# Patient Record
Sex: FEMALE | Race: WHITE | Employment: OTHER | URBAN - METROPOLITAN AREA
[De-identification: names, ages, dates, MRNs, and addresses within clinical notes are randomized per-mention and may not be internally consistent; named-entity substitution may affect disease eponyms.]

---

## 2017-04-07 PROBLEM — E78.5 HYPERLIPIDEMIA: Status: ACTIVE | Noted: 2017-04-07

## 2017-04-07 PROBLEM — J44.9 CHRONIC OBSTRUCTIVE PULMONARY DISEASE (HCC): Status: ACTIVE | Noted: 2017-04-07

## 2017-04-07 PROBLEM — E03.9 HYPOTHYROIDISM: Status: ACTIVE | Noted: 2017-04-07

## 2017-04-07 PROBLEM — I10 ESSENTIAL HYPERTENSION: Status: ACTIVE | Noted: 2017-04-07

## 2017-12-14 PROBLEM — F17.200 TOBACCO USE DISORDER: Status: ACTIVE | Noted: 2017-12-14

## 2017-12-14 PROBLEM — J30.2 CHRONIC SEASONAL ALLERGIC RHINITIS DUE TO FUNGAL SPORES: Status: ACTIVE | Noted: 2017-12-14

## 2017-12-14 PROBLEM — K58.2 IRRITABLE BOWEL SYNDROME WITH BOTH CONSTIPATION AND DIARRHEA: Status: ACTIVE | Noted: 2017-12-14

## 2017-12-14 PROBLEM — G89.4 CHRONIC PAIN SYNDROME: Status: ACTIVE | Noted: 2017-12-14

## 2017-12-14 PROBLEM — F33.9 RECURRENT MAJOR DEPRESSIVE DISORDER (HCC): Status: ACTIVE | Noted: 2017-12-14

## 2017-12-14 PROBLEM — K44.9 HERNIA, HIATAL: Status: ACTIVE | Noted: 2017-12-14

## 2018-05-01 ENCOUNTER — HOSPITAL ENCOUNTER (OUTPATIENT)
Age: 63
Discharge: HOME | End: 2018-05-03
Payer: MEDICARE

## 2018-05-01 DIAGNOSIS — E78.2 MIXED HYPERLIPIDEMIA: ICD-10-CM

## 2018-05-01 DIAGNOSIS — E03.9 ACQUIRED HYPOTHYROIDISM: ICD-10-CM

## 2018-05-01 LAB
ALBUMIN SERPL-MCNC: 4.4 G/DL (ref 3.5–5.2)
ALP BLD-CCNC: 80 U/L (ref 35–104)
ALT SERPL-CCNC: 30 U/L (ref 0–32)
ANION GAP SERPL CALCULATED.3IONS-SCNC: 14 MMOL/L (ref 7–16)
AST SERPL-CCNC: 32 U/L (ref 0–31)
BILIRUB SERPL-MCNC: 0.5 MG/DL (ref 0–1.2)
BUN BLDV-MCNC: 14 MG/DL (ref 8–23)
CALCIUM SERPL-MCNC: 9.5 MG/DL (ref 8.6–10.2)
CHLORIDE BLD-SCNC: 94 MMOL/L (ref 98–107)
CHOLESTEROL, TOTAL: 188 MG/DL (ref 0–199)
CO2: 29 MMOL/L (ref 22–29)
CREAT SERPL-MCNC: 1.1 MG/DL (ref 0.5–1)
GFR AFRICAN AMERICAN: >60
GFR NON-AFRICAN AMERICAN: 50 ML/MIN/1.73
GLUCOSE BLD-MCNC: 86 MG/DL (ref 74–109)
HDLC SERPL-MCNC: 78 MG/DL
LDL CHOLESTEROL CALCULATED: 94 MG/DL (ref 0–99)
POTASSIUM SERPL-SCNC: 4.9 MMOL/L (ref 3.5–5)
SODIUM BLD-SCNC: 137 MMOL/L (ref 132–146)
TOTAL PROTEIN: 7.4 G/DL (ref 6.4–8.3)
TRIGL SERPL-MCNC: 82 MG/DL (ref 0–149)
TSH SERPL DL<=0.05 MIU/L-ACNC: 6.28 UIU/ML (ref 0.27–4.2)
VLDLC SERPL CALC-MCNC: 16 MG/DL

## 2018-05-01 PROCEDURE — 80053 COMPREHEN METABOLIC PANEL: CPT

## 2018-05-01 PROCEDURE — 84443 ASSAY THYROID STIM HORMONE: CPT

## 2018-05-01 PROCEDURE — 80061 LIPID PANEL: CPT

## 2018-11-02 ENCOUNTER — HOSPITAL ENCOUNTER (OUTPATIENT)
Age: 63
Discharge: HOME | End: 2018-11-04
Payer: MEDICARE

## 2018-11-02 DIAGNOSIS — E03.9 ACQUIRED HYPOTHYROIDISM: ICD-10-CM

## 2018-11-02 LAB
ALBUMIN SERPL-MCNC: 4 G/DL (ref 3.5–5.2)
ALP BLD-CCNC: 62 U/L (ref 35–104)
ALT SERPL-CCNC: 17 U/L (ref 0–32)
ANION GAP SERPL CALCULATED.3IONS-SCNC: 14 MMOL/L (ref 7–16)
AST SERPL-CCNC: 27 U/L (ref 0–31)
BILIRUB SERPL-MCNC: 0.5 MG/DL (ref 0–1.2)
BUN BLDV-MCNC: 8 MG/DL (ref 8–23)
CALCIUM SERPL-MCNC: 9.4 MG/DL (ref 8.6–10.2)
CHLORIDE BLD-SCNC: 97 MMOL/L (ref 98–107)
CO2: 28 MMOL/L (ref 22–29)
CREAT SERPL-MCNC: 1 MG/DL (ref 0.5–1)
GFR AFRICAN AMERICAN: >60
GFR NON-AFRICAN AMERICAN: 56 ML/MIN/1.73
GLUCOSE BLD-MCNC: 91 MG/DL (ref 74–109)
POTASSIUM SERPL-SCNC: 4.4 MMOL/L (ref 3.5–5)
SODIUM BLD-SCNC: 139 MMOL/L (ref 132–146)
TOTAL PROTEIN: 7 G/DL (ref 6.4–8.3)
TSH SERPL DL<=0.05 MIU/L-ACNC: 0.3 UIU/ML (ref 0.27–4.2)

## 2018-11-02 PROCEDURE — 80053 COMPREHEN METABOLIC PANEL: CPT

## 2018-11-02 PROCEDURE — 84443 ASSAY THYROID STIM HORMONE: CPT

## 2019-06-11 ENCOUNTER — HOSPITAL ENCOUNTER (OUTPATIENT)
Dept: GENERAL RADIOLOGY | Age: 64
Discharge: HOME | End: 2019-06-13
Payer: MEDICARE

## 2019-06-11 ENCOUNTER — HOSPITAL ENCOUNTER (OUTPATIENT)
Age: 64
Discharge: HOME | End: 2019-06-13
Payer: MEDICARE

## 2019-06-11 DIAGNOSIS — M25.552 PAIN OF LEFT HIP JOINT: ICD-10-CM

## 2019-06-11 PROCEDURE — 73521 X-RAY EXAM HIPS BI 2 VIEWS: CPT

## 2019-06-25 ENCOUNTER — OFFICE VISIT (OUTPATIENT)
Dept: ORTHOPEDIC SURGERY | Age: 64
End: 2019-06-25
Payer: MEDICARE

## 2019-06-25 VITALS
SYSTOLIC BLOOD PRESSURE: 123 MMHG | HEART RATE: 73 BPM | DIASTOLIC BLOOD PRESSURE: 75 MMHG | WEIGHT: 159 LBS | TEMPERATURE: 98.4 F | HEIGHT: 60 IN | BODY MASS INDEX: 31.22 KG/M2

## 2019-06-25 DIAGNOSIS — M25.552 PAIN OF LEFT HIP JOINT: Primary | ICD-10-CM

## 2019-06-25 PROCEDURE — 1123F ACP DISCUSS/DSCN MKR DOCD: CPT | Performed by: ORTHOPAEDIC SURGERY

## 2019-06-25 PROCEDURE — 1090F PRES/ABSN URINE INCON ASSESS: CPT | Performed by: ORTHOPAEDIC SURGERY

## 2019-06-25 PROCEDURE — 4004F PT TOBACCO SCREEN RCVD TLK: CPT | Performed by: ORTHOPAEDIC SURGERY

## 2019-06-25 PROCEDURE — G8427 DOCREV CUR MEDS BY ELIG CLIN: HCPCS | Performed by: ORTHOPAEDIC SURGERY

## 2019-06-25 PROCEDURE — 99203 OFFICE O/P NEW LOW 30 MIN: CPT | Performed by: ORTHOPAEDIC SURGERY

## 2019-06-25 PROCEDURE — 4040F PNEUMOC VAC/ADMIN/RCVD: CPT | Performed by: ORTHOPAEDIC SURGERY

## 2019-06-25 PROCEDURE — 3017F COLORECTAL CA SCREEN DOC REV: CPT | Performed by: ORTHOPAEDIC SURGERY

## 2019-06-25 PROCEDURE — G8399 PT W/DXA RESULTS DOCUMENT: HCPCS | Performed by: ORTHOPAEDIC SURGERY

## 2019-06-25 PROCEDURE — G8417 CALC BMI ABV UP PARAM F/U: HCPCS | Performed by: ORTHOPAEDIC SURGERY

## 2019-06-26 NOTE — PROGRESS NOTES
Chief Complaint:   Chief Complaint   Patient presents with    Hip Pain     Lt hip pain x several months. Pain starts in Lt groin and radiates into Lt thigh. Pain usually at night when sitting or in bed. Must stand up to alleviate pain. Also c/o buckling Lt hip. X-ray in [facility]       HPI     Ana Schuler is a 71 y.o. female, who presents with a few month history of evidently spontaneous discomfort in her about the left hip, no history of trauma no previous problems with this joint no other joint complaints. No fever chills sweats or other systemic symptoms. Pain is diffuse about the hip sometimes radiating towards her groin and anterior thigh aggravated actually more when supine or sitting not with weightbearing. Has not really taken anything significant for pain, symptoms are interfering with daily activities. Allergies; medications; past medical, surgical, family, and social history; and problem list have been reviewed today and updated as indicated in this encounter - see below following Ortho specifics. Musculoskeletal: Upper extremities intact leg lengths are equal hip motion is normal bilaterally although mild diffuse discomfort about the left hip with full internal rotation. No crepitus felt. Knees are straight stable no laxity deformity or effusion. No motor or sensory deficit straight leg raising negative. Radiologic Studies: Recent x-ray of pelvis and left hip is reviewed in [facility], joint spaces are preserved without appreciable narrowing sclerosis or osteophyte formation, no evidence for appreciable degenerative disease. ASSESSMENT/PLAN:    Lee Ko was seen today for hip pain.     Diagnoses and all orders for this visit:    Pain of left hip joint  -     Amb External Referral To Physical Therapy     Treatment alternatives were reviewed including medical and physical therapies, injections, and surgical options, expected risks benefits and likely outcome of each were discussed in detail, questions asked and answered and understood. Given the normal x-rays I think the patient's pain may be myofascial or she may have labral pathology. Nonsurgical approaches advised, patient was referred for physical therapy as initial event, advised to use over-the-counter's as needed for pain. Questions asked and answered if symptoms persist follow-up in a couple of months for repeat exam possible diagnostic injection left hip. Return if symptoms worsen or fail to improve.        Zunilda Curry MD    6/26/2019  11:04 AM      Patient Active Problem List   Diagnosis    Hyperlipidemia    Hypothyroidism    Essential hypertension    Chronic obstructive pulmonary disease (HCC)    Chronic seasonal allergic rhinitis due to fungal spores    Chronic pain syndrome    Hernia, hiatal    Irritable bowel syndrome with both constipation and diarrhea    Recurrent major depressive disorder ([address]    Tobacco use disorder    Allergic rhinitis    COPD (chronic obstructive pulmonary disease) (HCC)    Minor depressive disorder    GERD (gastroesophageal reflux disease)    Ischemic colitis ([address]       Past Medical History:   Diagnosis Date    Allergic rhinitis     Arthritis     Asthma     Chronic pain syndrome     COPD (chronic obstructive pulmonary disease) (HCC)     GERD (gastroesophageal reflux disease)     Hernia, hiatal 12/14/2017    Hyperlipidemia     Hypertension     Hypothyroidism     Hypothyroidism 4/7/2017    Irritable bowel     Ischemic colitis ([address]     Minor depressive disorder     Pneumonia     Recurrent major depressive disorder ([address] 12/14/2017    Tobacco use disorder        Past Surgical History:   Procedure Laterality Date    APPENDECTOMY      CARDIOVASCULAR STRESS TEST  02/2006    Dr Nick Doan 70%    CHOLECYSTECTOMY      COLONOSCOPY  02/20/2013    -Ischemic colitis involving the descending and sigmoid colon-diverticulosis of the sigmoid colon    COLONOSCOPY  05/12/2008 of [facility]    Hypertension Mother     Lung Cancer Father         mets to brain         Review of Systems  As follows except as previously noted in HPI:  Constitutional: Negative for chills, diaphoresis, fatigue, fever and unexpected weight change. Respiratory: Negative for cough, shortness of breath and wheezing. Cardiovascular: Negative for chest pain and palpitations. Neurological: Negative for dizziness, syncope, cephalgia. GI / : negative  Musculoskeletal: see HPI       Objective:   Physical Exam   Constitutional: Oriented to person, place, and time. and appears well-developed and well-nourished. :   Head: Normocephalic and atraumatic. Eyes: EOM are normal.   Neck: Neck supple. Cardiovascular: Normal rate and regular rhythm. Pulmonary/Chest: Effort normal. No stridor. No respiratory distress, no wheezes. Abdominal:  No abnormal distension. Neurological: Alert and oriented to person, place, and time. Skin: Skin is warm and dry. Psychiatric: Normal mood and affect.  Behavior is normal. Thought content normal.

## 2020-01-02 ENCOUNTER — HOSPITAL ENCOUNTER (OUTPATIENT)
Dept: CT IMAGING | Age: 65
Discharge: HOME | End: 2020-01-04
Payer: MEDICARE

## 2020-01-02 PROCEDURE — G0297 LDCT FOR LUNG CA SCREEN: HCPCS

## 2020-06-14 PROBLEM — J45.909 ASTHMATIC BRONCHITIS: Status: ACTIVE | Noted: 2020-06-14

## 2020-11-03 PROBLEM — J44.9 CHRONIC OBSTRUCTIVE PULMONARY DISEASE (HCC): Status: RESOLVED | Noted: 2017-04-07 | Resolved: 2020-11-03

## 2021-03-19 DIAGNOSIS — I10 ESSENTIAL HYPERTENSION: ICD-10-CM

## 2021-03-19 DIAGNOSIS — E78.2 MIXED HYPERLIPIDEMIA: ICD-10-CM

## 2021-03-19 DIAGNOSIS — E03.9 ACQUIRED HYPOTHYROIDISM: ICD-10-CM

## 2021-03-19 LAB
ALBUMIN SERPL-MCNC: 4.2 G/DL (ref 3.5–5.2)
ALP BLD-CCNC: 101 U/L (ref 35–104)
ALT SERPL-CCNC: 19 U/L (ref 0–32)
ANION GAP SERPL CALCULATED.3IONS-SCNC: 7 MMOL/L (ref 7–16)
AST SERPL-CCNC: 29 U/L (ref 0–31)
BILIRUB SERPL-MCNC: 0.4 MG/DL (ref 0–1.2)
BUN BLDV-MCNC: 11 MG/DL (ref 8–23)
CALCIUM SERPL-MCNC: 8.8 MG/DL (ref 8.6–10.2)
CHLORIDE BLD-SCNC: 100 MMOL/L (ref 98–107)
CHOLESTEROL, TOTAL: 196 MG/DL (ref 0–199)
CO2: 31 MMOL/L (ref 22–29)
CREAT SERPL-MCNC: 1.2 MG/DL (ref 0.5–1)
GFR AFRICAN AMERICAN: 54
GFR NON-AFRICAN AMERICAN: 45 ML/MIN/1.73
GLUCOSE BLD-MCNC: 113 MG/DL (ref 74–99)
HDLC SERPL-MCNC: 49 MG/DL
LDL CHOLESTEROL CALCULATED: 121 MG/DL (ref 0–99)
POTASSIUM SERPL-SCNC: 4.4 MMOL/L (ref 3.5–5)
SODIUM BLD-SCNC: 138 MMOL/L (ref 132–146)
TOTAL PROTEIN: 7 G/DL (ref 6.4–8.3)
TRIGL SERPL-MCNC: 130 MG/DL (ref 0–149)
TSH SERPL DL<=0.05 MIU/L-ACNC: 8.81 UIU/ML (ref 0.27–4.2)
VLDLC SERPL CALC-MCNC: 26 MG/DL

## 2021-05-06 DIAGNOSIS — R35.0 URINE FREQUENCY: ICD-10-CM

## 2021-05-06 DIAGNOSIS — E03.9 ACQUIRED HYPOTHYROIDISM: ICD-10-CM

## 2021-05-06 LAB — TSH SERPL DL<=0.05 MIU/L-ACNC: 6.66 UIU/ML (ref 0.27–4.2)

## 2021-05-08 LAB — URINE CULTURE, ROUTINE: NORMAL

## 2021-05-13 ENCOUNTER — TELEPHONE (OUTPATIENT)
Dept: CASE MANAGEMENT | Age: 66
End: 2021-05-13

## 2021-05-17 ENCOUNTER — HOSPITAL ENCOUNTER (OUTPATIENT)
Dept: CT IMAGING | Age: 66
Discharge: HOME | End: 2021-05-19
Payer: MEDICARE

## 2021-05-17 DIAGNOSIS — Z87.891 PERSONAL HISTORY OF TOBACCO USE: ICD-10-CM

## 2021-05-17 PROCEDURE — 71271 CT THORAX LUNG CANCER SCR C-: CPT

## 2021-05-18 ENCOUNTER — TELEPHONE (OUTPATIENT)
Dept: CASE MANAGEMENT | Age: 66
End: 2021-05-18

## 2021-05-18 NOTE — TELEPHONE ENCOUNTER
No call, encounter opened to process CT Lung Screening. CT Lung Screen: 5/17/2021  Impression   There is no pulmonary mass or suspicious pulmonary nodule.       Mild emphysematous changes.       LUNG RADS:   Per ACR Lung-RADS Version 1.1       Category 2, Benign appearance or behavior.       Management:  Continue annual lung screening with LDCT in 12 months.       RECOMMENDATIONS:   If you would like to register your patient with the [facility], please contact the Nurse Navigator at   [phone]. Pack years: 39    Social History     Tobacco Use  Smoking Status: Former Smoker    Start Date: 1972   Quit Date: 2020   Types: Cigarettes   Packs/Day: 1   Years: 39   Pack Years: 39   Smokeless Tobacco: Never Used         Results letter sent to patient via my chart or mailed.      [address]

## 2021-08-09 ENCOUNTER — HOSPITAL ENCOUNTER (OUTPATIENT)
Age: 66
Discharge: HOME | End: 2021-08-09
Payer: COMMERCIAL

## 2021-08-09 PROCEDURE — 93005 ELECTROCARDIOGRAM TRACING: CPT | Performed by: PSYCHIATRY & NEUROLOGY

## 2021-08-10 LAB
EKG ATRIAL RATE: 66 BPM
EKG P AXIS: 69 DEGREES
EKG P-R INTERVAL: 162 MS
EKG Q-T INTERVAL: 396 MS
EKG QRS DURATION: 80 MS
EKG QTC CALCULATION (BAZETT): 415 MS
EKG R AXIS: 51 DEGREES
EKG T AXIS: 52 DEGREES
EKG VENTRICULAR RATE: 66 BPM

## 2021-08-10 PROCEDURE — 93010 ELECTROCARDIOGRAM REPORT: CPT | Performed by: INTERNAL MEDICINE

## 2022-02-21 ENCOUNTER — OFFICE VISIT (OUTPATIENT)
Dept: PULMONOLOGY | Age: 67
End: 2022-02-21
Payer: MEDICARE

## 2022-02-21 VITALS
DIASTOLIC BLOOD PRESSURE: 87 MMHG | OXYGEN SATURATION: 93 % | RESPIRATION RATE: 16 BRPM | HEIGHT: 60 IN | TEMPERATURE: 98.4 F | BODY MASS INDEX: 34.89 KG/M2 | WEIGHT: 177.7 LBS | HEART RATE: 78 BPM | SYSTOLIC BLOOD PRESSURE: 135 MMHG

## 2022-02-21 DIAGNOSIS — J43.1 PANLOBULAR EMPHYSEMA (HCC): Primary | ICD-10-CM

## 2022-02-21 DIAGNOSIS — Z87.891 PERSONAL HISTORY OF TOBACCO USE: ICD-10-CM

## 2022-02-21 LAB
EXPIRATORY TIME-POST: 6.38 SEC
EXPIRATORY TIME: 6.85 SEC
FEF 25-75% %CHNG: 55
FEF 25-75% %PRED-POST: 37 %
FEF 25-75% %PRED-PRE: 24 L/SEC
FEF 25-75% PRED: 1.79 L/SEC
FEF 25-75%-POST: 0.67 L/SEC
FEF 25-75%-PRE: 0.43 L/SEC
FEV1 %PRED-POST: 51 %
FEV1 %PRED-PRE: 41 %
FEV1 PRED: 1.99 L
FEV1-POST: 1.03 L
FEV1-PRE: 0.83 L
FEV1/FVC %PRED-POST: 71 %
FEV1/FVC %PRED-PRE: 63 %
FEV1/FVC PRED: 79 %
FEV1/FVC-POST: 57 %
FEV1/FVC-PRE: 50 %
FVC %PRED-POST: 72 L
FVC %PRED-PRE: 65 %
FVC PRED: 2.53 L
FVC-POST: 1.83 L
FVC-PRE: 1.66 L
PEF %PRED-POST: 28 %
PEF %PRED-PRE: 23 L/SEC
PEF PRED: 5.22 L/SEC
PEF%CHNG: 17
PEF-POST: 1.47 L/SEC
PEF-PRE: 1.25 L/SEC

## 2022-02-21 PROCEDURE — 3017F COLORECTAL CA SCREEN DOC REV: CPT | Performed by: INTERNAL MEDICINE

## 2022-02-21 PROCEDURE — 1123F ACP DISCUSS/DSCN MKR DOCD: CPT | Performed by: INTERNAL MEDICINE

## 2022-02-21 PROCEDURE — 94060 EVALUATION OF WHEEZING: CPT | Performed by: INTERNAL MEDICINE

## 2022-02-21 PROCEDURE — G8428 CUR MEDS NOT DOCUMENT: HCPCS | Performed by: INTERNAL MEDICINE

## 2022-02-21 PROCEDURE — 99203 OFFICE O/P NEW LOW 30 MIN: CPT | Performed by: INTERNAL MEDICINE

## 2022-02-21 PROCEDURE — G8417 CALC BMI ABV UP PARAM F/U: HCPCS | Performed by: INTERNAL MEDICINE

## 2022-02-21 PROCEDURE — 4040F PNEUMOC VAC/ADMIN/RCVD: CPT | Performed by: INTERNAL MEDICINE

## 2022-02-21 PROCEDURE — G8399 PT W/DXA RESULTS DOCUMENT: HCPCS | Performed by: INTERNAL MEDICINE

## 2022-02-21 PROCEDURE — G0296 VISIT TO DETERM LDCT ELIG: HCPCS | Performed by: INTERNAL MEDICINE

## 2022-02-21 PROCEDURE — 1036F TOBACCO NON-USER: CPT | Performed by: INTERNAL MEDICINE

## 2022-02-21 PROCEDURE — 3023F SPIROM DOC REV: CPT | Performed by: INTERNAL MEDICINE

## 2022-02-21 PROCEDURE — G8484 FLU IMMUNIZE NO ADMIN: HCPCS | Performed by: INTERNAL MEDICINE

## 2022-02-21 PROCEDURE — 1090F PRES/ABSN URINE INCON ASSESS: CPT | Performed by: INTERNAL MEDICINE

## 2022-02-21 RX ORDER — FLUTICASONE FUROATE, UMECLIDINIUM BROMIDE AND VILANTEROL TRIFENATATE 100; 62.5; 25 UG/1; UG/1; UG/1
1 POWDER RESPIRATORY (INHALATION) DAILY
Qty: 3 EACH | Refills: 3 | Status: SHIPPED | OUTPATIENT
Start: 2022-02-21

## 2022-02-21 ASSESSMENT — PULMONARY FUNCTION TESTS
FVC_PERCENT_PREDICTED_PRE: 65
FEV1_PERCENT_PREDICTED_PRE: 41
FEV1_PREDICTED: 1.99
FVC_PREDICTED: 2.53
FVC_PERCENT_PREDICTED_POST: 72
FEV1/FVC_PRE: 50
FEV1_POST: 1.03
FEV1_PRE: 0.83
FVC_POST: 1.83
FEV1_PERCENT_PREDICTED_POST: 51
FEV1/FVC_PREDICTED: 79
FVC_PRE: 1.66
FEV1/FVC_PERCENT_PREDICTED_PRE: 63
FEV1/FVC_PERCENT_PREDICTED_POST: 71
FEV1/FVC_POST: 57

## 2022-02-21 NOTE — PATIENT INSTRUCTIONS
What is lung cancer screening? Lung cancer screening is a way in which doctors check the lungs for early signs of cancer in people who have no symptoms of lung cancer. A low-dose CT scan uses much less radiation than a normal CT scan and shows a more detailed image of the lungs than a standard X-ray. The goal of lung cancer screening is to find cancer early, before it has a chance to grow, spread, or cause problems. One large study found that smokers who were screened with low-dose CT scans were less likely to die of lung cancer than those who were screened with standard X-ray. Below is a summary of the things you need to know regarding screening for lung cancer with low-dose computed tomography (LDCT). This is a screening program that involves routine annual screening with LDCT studies of the lung. The LDCTs are done using low-dose radiation that is not thought to increase your cancer risk. If you have other serious medical conditions (other cancers, congestive heart failure) that limit your life expectancy to less than 10 years, you should not undergo lung cancer screening with LDCT. The chance is 20%-60% that the LDCT result will show abnormalities. This would require additional testing which could include repeat imaging or even invasive procedures. Most (about 95%) of \"abnormal\" LDCT results are false in the sense that no lung cancer is ultimately found. Additionally, some (about 10%) of the cancers found would not affect your life expectancy, even if undetected and untreated. If you are still smoking, the single most important thing that you can do to reduce your risk of dying of lung cancer is to quit. For this screening to be covered by Medicare and most other insurers, strict criteria must be met. If you do not meet these criteria, but still wish to undergo LDCT testing, you will be required to sign a waiver indicating your willingness to pay for the scan.         Patient Education Chronic Obstructive Pulmonary Disease (COPD): Care Instructions  Overview     Chronic obstructive pulmonary disease (COPD) is a lung disease that makes it hard to breathe. With COPD, the airways that lead to the lungs are narrowed, and the tiny air sacs in the lungs are damaged and lose their stretch. People with COPD have decreased airflow in and out of the lungs, which makes it hard to breathe. The airways also can get clogged with thick mucus. Cigarette smoking is a major cause of COPD. Although there is no cure for COPD, you can slow its progress. Following your treatment plan and taking care of yourself can help you feel better and live longer. Follow-up care is a key part of your treatment and safety. Be sure to make and go to all appointments, and call your doctor if you are having problems. It's also a good idea to know your test results and keep a list of the medicines you take. How can you care for yourself at home? Staying healthy    · Do not smoke. This is the most important step you can take to prevent more damage to your lungs. If you need help quitting, talk to your doctor about stop-smoking programs and medicines. These can increase your chances of quitting for good.     · Avoid colds and other infections. Get the pneumococcal and whooping cough (pertussis) vaccines. If you have had these vaccines before, ask your doctor if you need another dose. Get the flu vaccine every fall. If you must be around people with colds or the flu, wash your hands often.     · Avoid secondhand smoke and air pollution. Try to stay inside with your windows closed when air pollution is bad. Medicines and oxygen therapy    · Take your medicines exactly as prescribed. Call your doctor if you think you are having a problem with your medicine. You may be taking medicines such as:  ? Bronchodilators. These help open your airways and make breathing easier.  They are either short-acting (work for 4 to 9 hours) or long-acting (work for 12 to 24 hours). You inhale most bronchodilators, so they start to act quickly. Always carry your quick-relief inhaler with you in case you need it. ? Corticosteroids (prednisone, budesonide). These reduce airway inflammation. They come in inhaled or pill form.     · Ask your doctor or pharmacist if you are using each type of inhaler correctly. With correct use, the medicine is more likely to get to your lungs.     · See if a spacer is right for you. A spacer may also help you get more inhaled medicine to your lungs. If you use one, ask how to use it properly.     · Do not take any vitamins, over-the-counter medicine, or herbal products without talking to your doctor first.     · If your doctor prescribed antibiotics, take them as directed. Do not stop taking them just because you feel better. You need to take the full course of antibiotics.     · If you use oxygen therapy, use the flow rate your doctor has recommended. Don't change it without talking to your doctor first. Oxygen therapy boosts the amount of oxygen in your blood and helps you breathe easier. Activity    · Get regular exercise. Walking is an easy way to get exercise. Start out slowly, and walk a little more each day.     · Pay attention to your breathing. You are exercising too hard if you can't talk while you exercise.     · Take short rest breaks when doing household chores and other activities.     · Learn breathing methods--such as breathing through pursed lips--to help you become less short of breath.     · If your doctor has not set you up with a pulmonary rehabilitation program, ask if rehab is right for you. Rehab includes exercise programs, education about your disease and how to manage it, help with diet and other changes, and emotional support.    Diet    · Eat regular, healthy meals.     · Use bronchodilators about 1 hour before you eat to make it easier to eat.     · Eat several smaller, frequent meals to prevent getting too full. A full stomach can push on the muscle that helps you breathe (your diaphragm) and make it harder to breathe.     · Drink beverages at the end of the meal.     · Avoid foods that are hard to chew.     · Eat foods that contain protein so you don't lose muscle mass.     · Talk with your doctor if you gain too much weight or if you lose weight without trying. Mental health    · Talk to your family, friends, or a therapist about your feelings. Some people feel frightened, angry, hopeless, helpless, and even guilty. Talking openly about feelings may help you cope. If these feelings last, talk to your doctor. When should you call for help? Call 911 anytime you think you may need emergency care. For example, call if:    · You have severe trouble breathing.     · You are having chest pain that is different or worse than usual.   Call your doctor now or seek immediate medical care if:    · You have new or worse trouble breathing.     · You cough up blood.     · You have a fever.     · You have feelings of anxiety or depression. Watch closely for changes in your health, and be sure to contact your doctor if:    · You cough more deeply or more often, especially if you notice more mucus or a change in the color of your mucus.     · You have new or worse swelling in your legs or belly.     · You are not getting better as expected. Where can you learn more? Go to [email or web address] org and sign in to your [employer] account. Enter N279 in the Sightly box to learn more about \"Chronic Obstructive Pulmonary Disease (COPD): Care Instructions. \"     If you do not have an account, please click on the \"Sign Up Now\" link. Current as of: July 6, 2021               Content Version: 13.1  © 4682-8444 Healthwise, Incorporated. Care instructions adapted under license by [facility] ([facility]).  If you have questions about a medical condition or this instruction, always ask your healthcare professional. [address] any warranty or liability for your use of this information. [address] [facility]  [address] [facility], [address] [facility]  Office: [phone]      Your were seen in the office today for COPD      We  did make any changes to your medications today. Stop Flovent Stop spiriva  Start Trelegy    Testing ordered today was CT scan of the chest      Vaccines recommended  none    Follow up in 6 months, Please call us sooner if the Trelegy is not helping your symptoms or they fail to improve. Please do not hesitate to call the office with any questions.

## 2022-02-21 NOTE — PROGRESS NOTES
[facility]     HISTORY OF PRESENT ILLNESS:    Gabriela Rose is a 78y.o. year old female here for evaluation of eczema. The patient reports that despite her inhalers she is having more frequent issues with her breathing. She is currently prescribed Spiriva, Flovent, and Proventil. She reports that she is sometimes using the Flovent 3-4 times a day. Her breathing is worse with humidity and cold air. Symptoms include wheezing. She also reports having some sinus drainage and a daily cough. She quit smoking 2 or 3 years ago she reports that previously she smoked half to 1 pack/day for 40-adair years. She denies any thoracic surgeries. She denies any alcohol. No illicits. Her inhalational exposures reports that she is around hay, raccoons, a wood burner, diesel smoke, and she has 2 horses. She has no significant travel history. She reports that her father  of lung cancer and that her brother has COPD and \"spots on his lungs.     ALLERGIES:    Allergies   Allergen Reactions    Celebrex [Celecoxib]     Simvastatin     Avelox [Moxifloxacin Hcl In Nacl]     Biaxin [Clarithromycin]     Clarithromycin     Desvenlafaxine     Lipitor [Atorvastatin]     Lyrica [Pregabalin]     Tetracyclines & Related     Zithromax [Azithromycin]     Sulfa Antibiotics Rash    Vioxx [Rofecoxib] Nausea And Vomiting       PAST MEDICAL HISTORY:       Diagnosis Date    Allergic rhinitis     Chronic pain syndrome     [facility], Dr. Roxane Handley    COPD (chronic obstructive pulmonary disease) ([facility] [address]     follows with Pulmonology, Dr. Tio Clifford    GERD (gastroesophageal reflux disease)     Grade I diastolic dysfunction 4451    Hernia, hiatal 2017    Hyperlipidemia     Hypertension     Hypothyroidism 2017    Irritable bowel     Ischemic colitis ([facility] [address]     Mild tricuspid regurgitation     Overactive bladder     follows with Urology, Dr. Sarah Busch    Pneumonia     Pulmonary HTN [facility]) 2013    mild    Recurrent major depressive disorder ([facility] [address] 12/14/2017    follows with Psychiatry, Dr. Earline Awan    Restless legs syndrome     Vitamin D deficiency        MEDICATIONS:   Current Outpatient Medications   Medication Sig Dispense Refill    TRELEGY ELLIPTA 100-62.5-25 MCG/INH AEPB Inhale 1 puff into the lungs daily 3 each 3    montelukast (SINGULAIR) 10 MG tablet Take 1 tablet by mouth nightly 30 tablet 2    Cholecalciferol (VITAMIN D3) 1.25 MG (85253 UT) CAPS Take 1 capsule by mouth once a week 4 capsule 2    levothyroxine (SYNTHROID) 100 MCG tablet Take 1 tablet by mouth daily 30 tablet 2    rOPINIRole (REQUIP) 1 MG tablet Take 1 tablet by mouth nightly 30 tablet 2    benazepril-hydrochlorthiazide (LOTENSIN HCT) 20-12.5 MG per tablet Take 1 tablet by mouth daily 30 tablet 2    PROVENTIL  (90 Base) MCG/ACT inhaler Inhale 2 puffs into the lungs every 6 hours as needed for Wheezing (wheezing) Wants the proventil. Will pay out of pocket. (Patient taking differently: Inhale 2 puffs into the lungs every 6 hours as needed for Wheezing (wheezing) ) 1 each 3    simvastatin (ZOCOR) 40 MG tablet Take 1 tablet by mouth every evening (Patient taking differently: Take 40 mg by mouth once a week ) 90 tablet 1    citalopram (CELEXA) 20 MG tablet Take 20 mg by mouth daily       traZODone (DESYREL) 100 MG tablet Take 100 mg by mouth every evening      Coenzyme Q10 (COQ-10) 10 MG CAPS Take 1 capsule by mouth daily      fluticasone (FLONASE) 50 MCG/ACT nasal spray 2 sprays by Nasal route daily 1 Bottle 3    TL STANLEY RX 2.2-25-1 MG TABS tablet Take 1 tablet by mouth daily  1    lidocaine (LIDODERM) 5 % Place 1 patch onto the skin 2 times daily 12 hours on, 12 hours off. 60 patch 5    omeprazole (PRILOSEC) 40 MG capsule Take 1 capsule by mouth daily.  30 capsule 3    HYDROcodone-acetaminophen (NORCO)  MG per tablet Take 1 tablet by mouth every 8 hours as needed.  LORazepam (ATIVAN) 1 MG tablet Take 2 mg by mouth in the morning and at bedtime. No current facility-administered medications for this visit. SOCIAL AND OCCUPATIONAL HEALTH: Patient is a former smoker. She previously smoked 1/2 to 1 pack/day for approximately 40 years. She quit 2 to 3 years ago. She has no significant travel history. She does have 2 horses and is around hay, raccoons, a wood burner, and diesel smoke. Otherwise no known inhalational exposures. SURGICAL HISTORY:   Past Surgical History:   Procedure Laterality Date    APPENDECTOMY N/A     CARDIOVASCULAR STRESS TEST N/A 02/2006    Dr. Gina Low; EF=70%    CHOLECYSTECTOMY N/A     COLONOSCOPY N/A 02/20/2013    Dr. Theresa Man; ischemic colitis involving the descending and sigmoid colon, diverticulosis of the sigmoid colon    COLONOSCOPY N/A 05/12/2008    Dr. Starla Mcclain; internal hemorrhoids and spastic colon    ECHO COMPLETE N/A 02/19/2013    Dr. Derick Callaway; EF >97%-JRYRY I diastolic dysfunction, with mild TR and mild pulm HTN    TONSILLECTOMY Bilateral     UPPER GASTROINTESTINAL ENDOSCOPY N/A 02/19/2013    Dr. Theresa Man; gastritis    UPPER GASTROINTESTINAL ENDOSCOPY N/A 11/15/2011       FAMILY HISTORY: No family history of cancer, blood clots     REVIEW OF SYSTEMS:  Constitutional: No fevers, chills, unintentional weight loss  Skin: No rashes or lesions  EENT: No change in vision, change in hearing, change in taste, change in smell  Cardiovascular: Denies chest pain, chest pressure, palpitations  Respiration: Positive for increased shortness of breath with exertion, wheezing, nonproductive cough, dyspnea with bending over.   Gastrointestinal: Denies nausea, vomiting, diarrhea  Musculoskeletal: Denies joint or muscle pain  Neurological: Denies syncope, headache, seizures  Psychological: Denies anxiety or depression  Endocrine: Denies polyuria polydipsia  Hematopoietic/lymphatic: Denies easy bruising        PHYSICAL EXAMINATION:  Constitutional: Well-nourished, well-developed. No acute distress  EENT: PERRL, EOMI, no oropharyngeal erythema. No palpable adenopathy  Neck: Trachea and thyroid midline  Respiratory: Breath sounds are currently clear to auscultation bilaterally. There is no use of accessory muscles. Cardiovascular: Regular rate and rhythm, no murmurs rubs or gallops  Pulses: Equal bilaterally  Abdomen: Soft nontender bowel sounds present  Lymphatic: No palpable adenopathy  Musculoskeletal: Gait steady  Extremities: No clubbing, cyanosis, edema. Skin: No rashes or lesions  Neurological/Psychiatric: Neurologically intact, no focal deficits. Affect appropriate    DATA: Biometry was completed in clinic today which demonstrates FVC of 1.66 L which is 65% of predicted. FEV1 of 0.83 L which is 41% of predicted. FEV1 FVC ratio is 50. There is a 24% bronchodilator response. MVV is 29 which is 36% of predicted. SVC is 1.50 which is 59% of predicted. Flow volume loop is consistent with combined obstruction and restriction. Overall pulmonary function testing is consistent with severe COPD. MVV is significantly decreased which may be due to deconditioning versus neuromuscular weakness. IMPRESSION:       1. Severe COPD  2. Former tobacco use  3. Need for low-dose screening CT  4. Pulmonary hypertension? PLAN:      1. We will stop Flovent and Spiriva and start the patient on Trelegy. 2.  Low-dose screening CT of the chest is ordered for May. 3.  Patient is currently quite active however may be a candidate for pulmonary rehab in the future. 4.  There is documentation of a history of pulmonary hypertension on the patient's chart however she is unaware of this diagnosis.   I did review her last echocardiogram which is available which shows in February 2013 that there was a RVSP documented of 37 to 42 mmHg on echocardiogram.  Should patient's symptoms worsen may consider follow-up echocardiogram in future      I hope this updates you on my evaluation and clinical thinking. Thank you for allowing me to participate in his care. Sincerely,        Mli Lopez.  Office: [phone]  Fax: [phone]      Low Dose CT (LDCT) Lung Screening criteria met:     Age 55-77(Medicare) or 50-80 ([facility])   Pack year smoking >30 (Medicare) or >20 ([facility])   Still smoking or less than 15 year since quit   No sign or symptoms of lung cancer   > 11 months since last LDCT     Risks and benefits of lung cancer screening with LDCT scans discussed:    Significance of positive screen - False-positive LDCT results often occur. 95% of all positive results do not lead to a diagnosis of cancer. Usually further imaging can resolve most false-positive results; however, some patients may require invasive procedures. Over diagnosis risk - 10% to 12% of screen-detected lung cancer cases are over diagnosed--that is, the cancer would not have been detected in the patient's lifetime without the screening. Need for follow up screens annually to continue lung cancer screening effectiveness     Risks associated with radiation from annual LDCT- Radiation exposure is about the same as for a mammogram, which is about 1/3 of the annual background radiation exposure from everyday life. Starting screening at age 54 is not likely to increase cancer risk from radiation exposure. Patients with comorbidities resulting in life expectancy of < 10 years, or that would preclude treatment of an abnormality identified on CT, should not be screened due to lack of benefit.     To obtain maximal benefit from this screening, smoking cessation and long-term abstinence from smoking is critical

## 2022-02-21 NOTE — PROGRESS NOTES
Pt presents to the clinic for a referral for panlobular emphysema. Pt given samples of Trelegy in clinic. Prescription for Trelegy sent to pt's preferred pharmacy. Annual CT lung screen ordered during visit. Pt to follow up in clinic in 6 months.

## 2022-02-22 ENCOUNTER — OFFICE VISIT (OUTPATIENT)
Dept: FAMILY MEDICINE CLINIC | Age: 67
End: 2022-02-22
Payer: MEDICARE

## 2022-02-22 VITALS
HEART RATE: 76 BPM | WEIGHT: 176.6 LBS | BODY MASS INDEX: 34.67 KG/M2 | OXYGEN SATURATION: 93 % | DIASTOLIC BLOOD PRESSURE: 88 MMHG | TEMPERATURE: 97.7 F | HEIGHT: 60 IN | SYSTOLIC BLOOD PRESSURE: 168 MMHG

## 2022-02-22 DIAGNOSIS — R10.31 GROIN PAIN, CHRONIC, RIGHT: Primary | ICD-10-CM

## 2022-02-22 DIAGNOSIS — J43.1 PANLOBULAR EMPHYSEMA (HCC): ICD-10-CM

## 2022-02-22 DIAGNOSIS — G25.81 RESTLESS LEG SYNDROME: ICD-10-CM

## 2022-02-22 DIAGNOSIS — E55.9 VITAMIN D DEFICIENCY: ICD-10-CM

## 2022-02-22 DIAGNOSIS — I10 ESSENTIAL HYPERTENSION: ICD-10-CM

## 2022-02-22 DIAGNOSIS — E03.9 ACQUIRED HYPOTHYROIDISM: ICD-10-CM

## 2022-02-22 DIAGNOSIS — G89.29 GROIN PAIN, CHRONIC, RIGHT: Primary | ICD-10-CM

## 2022-02-22 PROBLEM — N32.81 OVERACTIVE BLADDER: Status: ACTIVE | Noted: 2022-02-22

## 2022-02-22 PROBLEM — F17.200 TOBACCO USE DISORDER: Status: RESOLVED | Noted: 2017-12-14 | Resolved: 2022-02-22

## 2022-02-22 PROBLEM — J18.9 PNEUMONIA: Status: RESOLVED | Noted: 2022-02-22 | Resolved: 2022-02-22

## 2022-02-22 PROBLEM — J18.9 PNEUMONIA: Status: ACTIVE | Noted: 2022-02-22

## 2022-02-22 PROCEDURE — 3017F COLORECTAL CA SCREEN DOC REV: CPT | Performed by: FAMILY MEDICINE

## 2022-02-22 PROCEDURE — 1123F ACP DISCUSS/DSCN MKR DOCD: CPT | Performed by: FAMILY MEDICINE

## 2022-02-22 PROCEDURE — G8399 PT W/DXA RESULTS DOCUMENT: HCPCS | Performed by: FAMILY MEDICINE

## 2022-02-22 PROCEDURE — 3023F SPIROM DOC REV: CPT | Performed by: FAMILY MEDICINE

## 2022-02-22 PROCEDURE — G8427 DOCREV CUR MEDS BY ELIG CLIN: HCPCS | Performed by: FAMILY MEDICINE

## 2022-02-22 PROCEDURE — 1036F TOBACCO NON-USER: CPT | Performed by: FAMILY MEDICINE

## 2022-02-22 PROCEDURE — 4040F PNEUMOC VAC/ADMIN/RCVD: CPT | Performed by: FAMILY MEDICINE

## 2022-02-22 PROCEDURE — G8417 CALC BMI ABV UP PARAM F/U: HCPCS | Performed by: FAMILY MEDICINE

## 2022-02-22 PROCEDURE — 99214 OFFICE O/P EST MOD 30 MIN: CPT | Performed by: FAMILY MEDICINE

## 2022-02-22 PROCEDURE — G8484 FLU IMMUNIZE NO ADMIN: HCPCS | Performed by: FAMILY MEDICINE

## 2022-02-22 PROCEDURE — 1090F PRES/ABSN URINE INCON ASSESS: CPT | Performed by: FAMILY MEDICINE

## 2022-02-22 RX ORDER — BENAZEPRIL HYDROCHLORIDE AND HYDROCHLOROTHIAZIDE 20; 12.5 MG/1; MG/1
1 TABLET ORAL DAILY
Qty: 30 TABLET | Refills: 2 | Status: SHIPPED
Start: 2022-02-22 | End: 2022-05-13 | Stop reason: SDUPTHER

## 2022-02-22 RX ORDER — CHOLECALCIFEROL (VITAMIN D3) 1250 MCG
1 CAPSULE ORAL WEEKLY
Qty: 4 CAPSULE | Refills: 2 | Status: SHIPPED
Start: 2022-02-22 | End: 2022-06-13 | Stop reason: SDUPTHER

## 2022-02-22 RX ORDER — ROPINIROLE 1 MG/1
1 TABLET, FILM COATED ORAL NIGHTLY
Qty: 30 TABLET | Refills: 2 | Status: SHIPPED
Start: 2022-02-22 | End: 2022-05-13 | Stop reason: SDUPTHER

## 2022-02-22 RX ORDER — MONTELUKAST SODIUM 10 MG/1
10 TABLET ORAL NIGHTLY
Qty: 30 TABLET | Refills: 2 | Status: SHIPPED
Start: 2022-02-22 | End: 2022-06-13 | Stop reason: SDUPTHER

## 2022-02-22 RX ORDER — LEVOTHYROXINE SODIUM 0.1 MG/1
100 TABLET ORAL DAILY
Qty: 30 TABLET | Refills: 2 | Status: SHIPPED
Start: 2022-02-22 | End: 2022-03-25 | Stop reason: DRUGHIGH

## 2022-02-22 SDOH — ECONOMIC STABILITY: FOOD INSECURITY: WITHIN THE PAST 12 MONTHS, YOU WORRIED THAT YOUR FOOD WOULD RUN OUT BEFORE YOU GOT MONEY TO BUY MORE.: NEVER TRUE

## 2022-02-22 SDOH — ECONOMIC STABILITY: FOOD INSECURITY: WITHIN THE PAST 12 MONTHS, THE FOOD YOU BOUGHT JUST DIDN'T LAST AND YOU DIDN'T HAVE MONEY TO GET MORE.: NEVER TRUE

## 2022-02-22 ASSESSMENT — ENCOUNTER SYMPTOMS
NAUSEA: 0
WHEEZING: 1
ABDOMINAL PAIN: 1
DIARRHEA: 0
SHORTNESS OF BREATH: 1
VOMITING: 0
COUGH: 1
BLOOD IN STOOL: 0
CONSTIPATION: 0

## 2022-02-22 ASSESSMENT — PATIENT HEALTH QUESTIONNAIRE - PHQ9
2. FEELING DOWN, DEPRESSED OR HOPELESS: 3
SUM OF ALL RESPONSES TO PHQ QUESTIONS 1-9: 17
7. TROUBLE CONCENTRATING ON THINGS, SUCH AS READING THE NEWSPAPER OR WATCHING TELEVISION: 2
10. IF YOU CHECKED OFF ANY PROBLEMS, HOW DIFFICULT HAVE THESE PROBLEMS MADE IT FOR YOU TO DO YOUR WORK, TAKE CARE OF THINGS AT HOME, OR GET ALONG WITH OTHER PEOPLE: 2
4. FEELING TIRED OR HAVING LITTLE ENERGY: 3
SUM OF ALL RESPONSES TO PHQ QUESTIONS 1-9: 17
6. FEELING BAD ABOUT YOURSELF - OR THAT YOU ARE A FAILURE OR HAVE LET YOURSELF OR YOUR FAMILY DOWN: 1
SUM OF ALL RESPONSES TO PHQ9 QUESTIONS 1 & 2: 6
9. THOUGHTS THAT YOU WOULD BE BETTER OFF DEAD, OR OF HURTING YOURSELF: 0
SUM OF ALL RESPONSES TO PHQ QUESTIONS 1-9: 17
SUM OF ALL RESPONSES TO PHQ QUESTIONS 1-9: 17
8. MOVING OR SPEAKING SO SLOWLY THAT OTHER PEOPLE COULD HAVE NOTICED. OR THE OPPOSITE, BEING SO FIGETY OR RESTLESS THAT YOU HAVE BEEN MOVING AROUND A LOT MORE THAN USUAL: 1
1. LITTLE INTEREST OR PLEASURE IN DOING THINGS: 3
5. POOR APPETITE OR OVEREATING: 2
3. TROUBLE FALLING OR STAYING ASLEEP: 2

## 2022-02-22 ASSESSMENT — SOCIAL DETERMINANTS OF HEALTH (SDOH): HOW HARD IS IT FOR YOU TO PAY FOR THE VERY BASICS LIKE FOOD, HOUSING, MEDICAL CARE, AND HEATING?: NOT VERY HARD

## 2022-02-22 NOTE — PROGRESS NOTES
2/22/2022    Munir Hunter is a 78 y.o. female here for:    Chief Complaint   Patient presents with   Arabella Green Doctor     Dr. Yocasta Stuart patient    Groin Pain     pulled muscle in November     Hypertension        HPI:  Groin pain   - Started in November 2021 after getting out of her  truck. Patient states that she pulled a muscle. - Affects right groin. Does not radiate. - Occurs daily but varies in severity.   - Describes pain as a pressure and an ache. - Better with nothing.   - Has not tried anything additional to help with her pain. She follows with Pain Management. Patient is on Norco  mg q 8 hours PRN for neck and shoulder pain. - Worse with getting into and out of her truck. - Pain is 6/10 on pain scale. - Admits to weakness of right lower extremity. \"It feels like my leg is going to give out on me. \"   - Denies numbness and tingling. HTN   - On benazepril-HCTZ 10-6.25 mg QD. Denies side effects of medication. Reports compliance with medication.  - Last eye exam: due   - Denies chest pain, dizziness, lightheadedness, and syncope. - Admits to palpitations. - Did not take her BP medication this morning. Patient states that she normally takes her medication in the afternoon.      Current Outpatient Medications   Medication Sig Dispense Refill    montelukast (SINGULAIR) 10 MG tablet Take 1 tablet by mouth nightly 30 tablet 2    Cholecalciferol (VITAMIN D3) 1.25 MG (34707 UT) CAPS Take 1 capsule by mouth once a week 4 capsule 2    levothyroxine (SYNTHROID) 100 MCG tablet Take 1 tablet by mouth daily 30 tablet 2    rOPINIRole (REQUIP) 1 MG tablet Take 1 tablet by mouth nightly 30 tablet 2    benazepril-hydrochlorthiazide (LOTENSIN HCT) 20-12.5 MG per tablet Take 1 tablet by mouth daily 30 tablet 2    TRELEGY ELLIPTA 100-62.5-25 MCG/INH AEPB Inhale 1 puff into the lungs daily 3 each 3    PROVENTIL  (90 Base) MCG/ACT inhaler Inhale 2 puffs into the lungs every 6 hours as needed for Wheezing (wheezing) Wants the proventil. Will pay out of pocket. (Patient taking differently: Inhale 2 puffs into the lungs every 6 hours as needed for Wheezing (wheezing) ) 1 each 3    simvastatin (ZOCOR) 40 MG tablet Take 1 tablet by mouth every evening (Patient taking differently: Take 40 mg by mouth once a week ) 90 tablet 1    citalopram (CELEXA) 20 MG tablet Take 20 mg by mouth daily       traZODone (DESYREL) 100 MG tablet Take 100 mg by mouth every evening      Coenzyme Q10 (COQ-10) 10 MG CAPS Take 1 capsule by mouth daily      fluticasone (FLONASE) 50 MCG/ACT nasal spray 2 sprays by Nasal route daily 1 Bottle 3    lidocaine (LIDODERM) 5 % Place 1 patch onto the skin 2 times daily 12 hours on, 12 hours off. 60 patch 5    omeprazole (PRILOSEC) 40 MG capsule Take 1 capsule by mouth daily. 30 capsule 3    HYDROcodone-acetaminophen (NORCO)  MG per tablet Take 1 tablet by mouth every 8 hours as needed.  LORazepam (ATIVAN) 1 MG tablet Take 2 mg by mouth in the morning and at bedtime.  TL STANLEY RX 2.2-25-1 MG TABS tablet Take 1 tablet by mouth daily  1     No current facility-administered medications for this visit.       Allergies   Allergen Reactions    Celebrex [Celecoxib]     Simvastatin     Avelox [Moxifloxacin Hcl In Nacl]     Biaxin [Clarithromycin]     Clarithromycin     Desvenlafaxine     Lipitor [Atorvastatin]     Lyrica [Pregabalin]     Tetracyclines & Related     Zithromax [Azithromycin]     Sulfa Antibiotics Rash    Vioxx [Rofecoxib] Nausea And Vomiting       Past Medical & Surgical History:      Diagnosis Date    Allergic rhinitis     Chronic pain syndrome     [facility], Dr. Sherry Patton    COPD (chronic obstructive pulmonary disease) ([facility] [address]     follows with Pulmonology, Dr. Nicolette Caballero    GERD (gastroesophageal reflux disease)     Grade I diastolic dysfunction 7618    Hernia, hiatal 12/14/2017    Hyperlipidemia     Negative for chest pain and leg swelling. Gastrointestinal: Positive for abdominal pain. Negative for blood in stool, constipation, diarrhea, nausea and vomiting. Musculoskeletal: Positive for arthralgias (right groin ) and myalgias (right groin ). Neurological: Negative for dizziness, syncope and light-headedness. Psychiatric/Behavioral: Positive for dysphoric mood (follows with Psychiatry). Negative for suicidal ideas. The patient is nervous/anxious (follows with Psychiatry). Denies homicidal ideation. BP Readings from Last 3 Encounters:   02/22/22 (!) 168/88   02/21/22 135/87   08/06/21 120/76       VS:  BP (!) 168/88 (Site: Left Upper Arm, Position: Sitting, Cuff Size: Large Adult)   Pulse 76   Temp 97.7 °F (36.5 °C)   Ht 5' (1.524 m)   Wt 176 lb 9.6 oz (80.1 kg)   SpO2 93%   BMI 34.49 kg/m²     Physical Exam  Vitals reviewed. Constitutional:       General: She is awake. She is not in acute distress. Appearance: She is well-developed. She is obese. She is not ill-appearing, toxic-appearing or diaphoretic. Neck:      Vascular: No carotid bruit. Cardiovascular:      Rate and Rhythm: Normal rate and regular rhythm. Heart sounds: S1 normal and S2 normal. No murmur heard. Pulmonary:      Breath sounds: Decreased air movement present. Decreased breath sounds present. No wheezing, rhonchi or rales. Abdominal:      General: Bowel sounds are normal. There is no distension. Palpations: Abdomen is soft. Tenderness: There is abdominal tenderness in the left lower quadrant. There is no guarding or rebound. Musculoskeletal:      Cervical back: Neck supple. Right upper leg: Tenderness (inguinal region) present. No bony tenderness. Left upper leg: No tenderness or bony tenderness. Right lower leg: No tenderness. No edema. Left lower leg: No tenderness. No edema. Comments: Pain and decreased ROM with external rotation of right hip.     Skin: General: Skin is warm and dry. Neurological:      General: No focal deficit present. Mental Status: She is alert. Psychiatric:         Attention and Perception: Attention normal.         Mood and Affect: Mood normal.         Speech: Speech normal.         Behavior: Behavior normal. Behavior is cooperative. Thought Content: Thought content normal.         Cognition and Memory: Cognition normal.         Judgment: Judgment normal.         Assessment/Plan:  1. Groin pain, chronic, right  New problem. Will get X-ray of bilateral hips and pelvis. Refer to PT.   - XR HIP BILATERAL W AP PELVIS (2 VIEWS); Future  - [facility]    2. Essential hypertension  Uncontrolled. Will increase benazepril-HCTZ from 10-6.25 mg QD to 20-12.5 mg QD. Follow-up in 1 month. - benazepril-hydrochlorthiazide (LOTENSIN HCT) 20-12.5 MG per tablet; Take 1 tablet by mouth daily  Dispense: 30 tablet; Refill: 2    3. Restless leg syndrome  - rOPINIRole (REQUIP) 1 MG tablet; Take 1 tablet by mouth nightly  Dispense: 30 tablet; Refill: 2    4. Acquired hypothyroidism  - levothyroxine (SYNTHROID) 100 MCG tablet; Take 1 tablet by mouth daily  Dispense: 30 tablet; Refill: 2    5. Panlobular emphysema (HCC)  - montelukast (SINGULAIR) 10 MG tablet; Take 1 tablet by mouth nightly  Dispense: 30 tablet; Refill: 2    6. Vitamin D deficiency  - Cholecalciferol (VITAMIN D3) 1.25 MG (81037 UT) CAPS; Take 1 capsule by mouth once a week  Dispense: 4 capsule; Refill: 2        Return in about 23 days (around 3/17/2022) for follow-up HTN .       Lucio Dorsey DO  Family Medicine

## 2022-02-24 ENCOUNTER — HOSPITAL ENCOUNTER (OUTPATIENT)
Dept: GENERAL RADIOLOGY | Age: 67
Discharge: HOME | End: 2022-02-26
Payer: MEDICARE

## 2022-02-24 ENCOUNTER — HOSPITAL ENCOUNTER (OUTPATIENT)
Age: 67
Discharge: HOME | End: 2022-02-26
Payer: MEDICARE

## 2022-02-24 DIAGNOSIS — G89.29 GROIN PAIN, CHRONIC, RIGHT: ICD-10-CM

## 2022-02-24 DIAGNOSIS — R10.31 GROIN PAIN, CHRONIC, RIGHT: ICD-10-CM

## 2022-02-24 PROCEDURE — 73521 X-RAY EXAM HIPS BI 2 VIEWS: CPT

## 2022-03-09 ENCOUNTER — EVALUATION (OUTPATIENT)
Dept: PHYSICAL THERAPY | Age: 67
End: 2022-03-09
Payer: MEDICARE

## 2022-03-09 DIAGNOSIS — G89.29 CHRONIC GROIN PAIN, RIGHT: Primary | ICD-10-CM

## 2022-03-09 DIAGNOSIS — R10.31 CHRONIC GROIN PAIN, RIGHT: Primary | ICD-10-CM

## 2022-03-09 PROCEDURE — 97161 PT EVAL LOW COMPLEX 20 MIN: CPT | Performed by: PHYSICAL THERAPIST

## 2022-03-09 NOTE — PROGRESS NOTES
Grade I diastolic dysfunction 8381    Hernia, hiatal 12/14/2017    Hyperlipidemia     Hypertension     Hypothyroidism 04/07/2017    Irritable bowel     Ischemic colitis ([facility] [address]     Mild tricuspid regurgitation 2013    Overactive bladder     follows with Urology, Dr. Tamie Varner    Pneumonia     Pulmonary HTN ([facility] [address] 2013    mild    Recurrent major depressive disorder ([facility] [address] 12/14/2017    follows with Psychiatry, Dr. Franc Mendieta    Restless legs syndrome     Vitamin D deficiency      Past Surgical History:   Procedure Laterality Date    APPENDECTOMY N/A     CARDIOVASCULAR STRESS TEST N/A 02/2006    Dr. Oscar Graham; EF=70%    CHOLECYSTECTOMY N/A     COLONOSCOPY N/A 02/20/2013    Dr. Thom Dumas; ischemic colitis involving the descending and sigmoid colon, diverticulosis of the sigmoid colon    COLONOSCOPY N/A 05/12/2008    Dr. Leslie Hoskins; internal hemorrhoids and spastic colon    ECHO COMPLETE N/A 02/19/2013    Dr. Bonnie Ross; EF >16%-SKWAI I diastolic dysfunction, with mild TR and mild pulm HTN    TONSILLECTOMY Bilateral     UPPER GASTROINTESTINAL ENDOSCOPY N/A 02/19/2013    Dr. Thom Dumas; gastritis    UPPER GASTROINTESTINAL ENDOSCOPY N/A 11/15/2011       Medications:   Current Outpatient Medications   Medication Sig Dispense Refill    montelukast (SINGULAIR) 10 MG tablet Take 1 tablet by mouth nightly 30 tablet 2    Cholecalciferol (VITAMIN D3) 1.25 MG (63478 UT) CAPS Take 1 capsule by mouth once a week 4 capsule 2    levothyroxine (SYNTHROID) 100 MCG tablet Take 1 tablet by mouth daily 30 tablet 2    rOPINIRole (REQUIP) 1 MG tablet Take 1 tablet by mouth nightly 30 tablet 2    benazepril-hydrochlorthiazide (LOTENSIN HCT) 20-12.5 MG per tablet Take 1 tablet by mouth daily 30 tablet 2    TRELEGY ELLIPTA 100-62.5-25 MCG/INH AEPB Inhale 1 puff into the lungs daily 3 each 3    PROVENTIL  (90 Base) MCG/ACT inhaler Inhale 2 puffs into the lungs every 6 hours as needed for Wheezing (wheezing) Wants the proventil.   Will pay out of pocket. (Patient taking differently: Inhale 2 puffs into the lungs every 6 hours as needed for Wheezing (wheezing) ) 1 each 3    simvastatin (ZOCOR) 40 MG tablet Take 1 tablet by mouth every evening (Patient taking differently: Take 40 mg by mouth once a week ) 90 tablet 1    citalopram (CELEXA) 20 MG tablet Take 20 mg by mouth daily       traZODone (DESYREL) 100 MG tablet Take 100 mg by mouth every evening      Coenzyme Q10 (COQ-10) 10 MG CAPS Take 1 capsule by mouth daily      fluticasone (FLONASE) 50 MCG/ACT nasal spray 2 sprays by Nasal route daily 1 Bottle 3    TL STANLEY RX 2.2-25-1 MG TABS tablet Take 1 tablet by mouth daily  1    lidocaine (LIDODERM) 5 % Place 1 patch onto the skin 2 times daily 12 hours on, 12 hours off. 60 patch 5    omeprazole (PRILOSEC) 40 MG capsule Take 1 capsule by mouth daily. 30 capsule 3    HYDROcodone-acetaminophen (NORCO)  MG per tablet Take 1 tablet by mouth every 8 hours as needed.  LORazepam (ATIVAN) 1 MG tablet Take 2 mg by mouth in the morning and at bedtime. No current facility-administered medications for this visit. Occupation: disability.      Exercise regimen: none    Hobbies: none    Patient Goals: pain relief, to be more mobile    Contraindications/Precautions: h/o back injury and broken tailbone    OBJECTIVE:     Observations: well nourished female    Inspection: normal orthopedic exam         Gait: normal    Functional Strength: No deficits noted    Range of Motion:    Joint/Motion:    Hip:  Right:   AROM: grossly WFL, with pain at end-ranges   Left:   AROM: WNL     Knee:  Right:   AROM: WNL    Left:   AROM: WNL          Strength:     Trunk: 4/5  Hip:  Right: Flexion 4/5,  Extension 4/5, Abduction 4/5, ER 4/5, IR 4/5    Left: Flexion 5/5,  Extension 5/5, Abduction 5/5, ER 5/5, IR 5/5     Knee:   Right: Flexion 5/5,  Extension 5/5  Left: Flexion 5/5,  Extension 5/5    Palpation: N/A     Sensation: intact to light touch and temperature. Special Tests:   [] Nerve Root Compression           Right []+ / [] -    Left []+ / [] -  [] Slump           Right []+ / [] -    Left []+ / [] -  [] FADIR          Right []+ / [] -    Left []+ / [] -  [] S-I Distraction          Right []+ / [] -    Left []+ / [] -     [] SLR           Right []+ / [] -    Left []+ / [] -     [] JOSEPH          Right []+ / [] -    Left []+ / [] -  [] S-I Compression          Right []+ / [] -    Left []+ / [] -   [x] Leg Length: [x]+ / [] -   [] Scour: []+ / [] -       Special Test Comments: Left leg 0.5 cm shorter than right    ASSESSMENT     Outcome Measure:   LEFS 15/80    Problems:    Pain reported 5-8/10   ROM decreased    Strength decreased   Decreased functional ability with walking, stairs, standing, getting in and out of truck    Reason for Skilled Care: Pt with recent onset of right groin pain which is interfering with functional activities and pt quality of life. [x] There are no barriers affecting plan of care or recovery    [] Barriers to this patient's plan of care or recovery include.     Domestic Concerns:  [x] No  [] Yes:    Long Term goals (4-6 weeks)   Decrease reported pain to 2-3/10 at the worst   Increase ROM to [facility] without pain   Increase Strength to 4+ to 5/5    LEFS 30/100   Independent with Home Exercise Programs    Rehab Potential: [x] Good  [] Fair  [] Poor    PLAN       Treatment Plan:   [x] Therapeutic Exercise  [x] Therapeutic Activity  [x] Neuromuscular Re-education   [] Gait Training  [] Balance Training  [] Aerobic conditioning  [x] Manual Therapy  [] Massage/Fascial release   [] Work/Sport specific activities    [] Pain Neuroscience [x] Cold/hotpack  [] Vasocompression  [] Electrical Stimulation  [] Lumbar/Cervical Traction  [] Ultrasound   [] Iontophoresis: 4 mg/mL Dexamethasone Sodium Phosphate 40-80 mAmin  [] Dry Needling      [x] Instruction in HEP      []  Medication allergies reviewed for use of Dexamethasone Sodium Phosphate 4mg/ml  with iontophoresis treatments. Patient is not allergic. The following CPT codes are likely to be used in the care of this patient: 80021 PT Evaluation: Low Complexity, 88801 PT Re-Evaluation, 92692 Therapeutic Exercise, [id number] Neuromuscular Re-Education, 91431 Therapeutic Activities and 48225 Manual Therapy      Suggested Professional Referral: [x] No  [] Yes:     Patient Education:  [x] Plans/Goals, Risks/Benefits discussed  [x] Home exercise program  Method of Education: [x] Verbal  [x] Demo  [x] Written  Comprehension of Education:  [x] Verbalizes understanding. [x] Demonstrates understanding. [] Needs Review. [] Demonstrates/verbalizes understanding of HEP/Ed previously given. Frequency:  1 day per week for 4-6 weeks    Patient understands diagnosis/prognosis and consents to treatment, plan and goals: [x] Yes    [] No     Thank you for the opportunity to work with your patient. If you have questions or comments, please contact me at numbers listed above. Electronically signed by: Milka Toussaint, [id number]    Medicare Patients Only     Please sign Physician's Certification and return to: [address]  [facility]AP[facility]  [address] [facility] [id number]  Dept: [phone]  Dept Fax: [phone]  Loc: [phone] Certification / Comments     Frequency/Duration 1 day per week for 4-6 weeks. Certification period from 3/9/2022  to 6/8/22. I have reviewed the Plan of Care established for skilled therapy services and certify that the services are required and that they will be provided while the patient is under my care.     Physician's Comments/Revisions:               Physician's Printed Name:                                           [de-identified] Signature:                                                               Date:

## 2022-03-09 NOTE — PROGRESS NOTES
[facility]          Phone: [phone] Fax: [phone]    Physical Therapy Daily Treatment Note  Date:  3/9/2022    Patient Name:  Kristal Ford    :  1955  MRN: [id number]    Evaluating Therapist:  Phyllis Nolen 372  Restrictions/Precautions:    Diagnosis:     Diagnosis Orders   1. Chronic groin pain, right       Treatment Diagnosis:    Insurance/Certification information:  [facility] Medicare  Referring Physician:  Nora Martinez DO  Plan of care signed (Y/N):    Visit# / total visits:  -  Pain level: 6/10   Time In:  1300  Time Out:  1330    Subjective:  See evaluation    Exercises:  Exercise/Equipment Resistance/Repetitions Other comments     Bike/stepper       Hamstring stretch       Hip flexor/quad stretch       Figure 4 stretch       SKTC       Quad sets       Glut sets       SLR       SAQ         Sit to stand                                                                       Other Therapeutic Activities:  PT evaluation completed. Provided with a 1/2 cm heel lift for left shoe and instructed pt in gradual increase in wearing time to tolerance. Pt verbalized understanding.     Home Exercise Program:  N/A    Manual Treatments:  N/A    Modalities:  N/A     Time-in Time-out Total Time   06268  Evaluation Low Complexity 1300 1330 30   25969  Evaluation Med Complexity      98606  Evaluation High Complexity      05949  Ther Ex      80875  Neuro Re-ed        56778  Ther Activities        41814  Manual Therapy       77035  E-stim       19329  Ultrasound            Session 1300 1330 30       Treatment/Activity Tolerance:  [x] Patient tolerated treatment well [] Patient limited by fatigue  [] Patient limited by pain  [] Patient limited by other medical complications  [] Other:     Prognosis: [x] Good [] Fair  [] Poor    Patient Requires Follow-up: [x] Yes  [] No    Plan:   [] Continue per plan of care [] Alter current plan (see comments)  [x] Plan of care initiated [] Hold pending MD visit [] Discharge  Plan for Next Session:        Electronically signed by:  Sheryle Pal, [place], [id number]

## 2022-03-14 ENCOUNTER — TREATMENT (OUTPATIENT)
Dept: PHYSICAL THERAPY | Age: 67
End: 2022-03-14
Payer: MEDICARE

## 2022-03-14 DIAGNOSIS — R10.31 CHRONIC GROIN PAIN, RIGHT: Primary | ICD-10-CM

## 2022-03-14 DIAGNOSIS — G89.29 CHRONIC GROIN PAIN, RIGHT: Primary | ICD-10-CM

## 2022-03-14 PROCEDURE — 97110 THERAPEUTIC EXERCISES: CPT

## 2022-03-14 NOTE — PROGRESS NOTES
[facility]          Phone: [phone] Fax: [phone]    Physical Therapy Daily Treatment Note  Date:  3/14/2022    Patient Name:  Juan Hadley    :  1955  MRN: [id number]    Evaluating Therapist:  Geoff Gudino, [place] [id number]  Restrictions/Precautions:    Diagnosis:     Diagnosis Orders   1. Chronic groin pain, right       Treatment Diagnosis:    Insurance/Certification information:  [facility] Medicare  Referring Physician:  DO Kaleb  Plan of care signed (Y/N):    Visit# / total visits:  -  Pain level: 6/10   Time In:  1120  Time Out:  1155    Subjective:  Pt expressed pain equally in L and R groin today. She stated she twisted on R LE and thinks she tweeked L groin when this occurred. Exercises:  Exercise/Equipment Resistance/Repetitions Other comments    /stepper 5 mins      Hamstring stretch 15 sec x 5 reps B      Hip flexor/quad stretch TBA      Figure 4 stretch 15 sec x 3 reps R      SKTC 15 sec x 3 reps R      Quad sets 5 sec x 10 reps      Glut sets 5 sec x 10 reps      SLR X 5 repz A/AAROM      SAQ 3 sec x 10 reps         Sit to stand X 10 reps from chair                                                                      Other   Pt reported tolerating heel lift. Pt performed ex w/ slow pacing and rested PRN.   Reported pain remained the same after session but only in R groin    Home Exercise Program:  N/A    Manual Treatments:  N/A    Modalities:  N/A     Time-in Time-out Total Time   84091  Evaluation Low Complexity      36429  Evaluation Med Complexity      77324  Evaluation High Complexity      48644  Ther Ex 1120 1155 35   10448  Neuro Re-ed        63124  Ther Activities        81318  Manual Therapy       69873  E-stim       07379  Ultrasound            Session 1120 1155 35       Treatment/Activity Tolerance:  [x] Patient tolerated treatment well [] Patient limited by fatigue  [] Patient limited by pain  [] Patient limited by other medical complications  [] Other:     Prognosis: [x] Good [] Fair  [] Poor    Patient Requires Follow-up: [x] Yes  [] No    Plan:   [x] Continue per plan of care [] Alter current plan (see comments)  [] Plan of care initiated [] Hold pending MD visit [] Discharge  Plan for Next Session:        Electronically signed by:  Cali Serna, PTA [id number]

## 2022-03-23 ENCOUNTER — TREATMENT (OUTPATIENT)
Dept: PHYSICAL THERAPY | Age: 67
End: 2022-03-23
Payer: MEDICARE

## 2022-03-23 DIAGNOSIS — G89.29 CHRONIC GROIN PAIN, RIGHT: Primary | ICD-10-CM

## 2022-03-23 DIAGNOSIS — R10.31 CHRONIC GROIN PAIN, RIGHT: Primary | ICD-10-CM

## 2022-03-23 PROCEDURE — 97110 THERAPEUTIC EXERCISES: CPT

## 2022-03-23 NOTE — PROGRESS NOTES
[facility]          Phone: [phone] Fax: [phone]    Physical Therapy Daily Treatment Note  Date:  3/23/2022    Patient Name:  Jeni Luna    :  1955  MRN: [id number]    Evaluating Therapist:  Chas Bonilla, VICKY [id number]  Restrictions/Precautions:    Diagnosis:     Diagnosis Orders   1. Chronic groin pain, right       Treatment Diagnosis:    Insurance/Certification information:  [facility] Medicare  Referring Physician:  Sanam Calderon DO  Plan of care signed (Y/N):    Visit# / total visits:  3/4-  Pain level: 7/10   Time In:  1123  Time Out:  1152    Subjective:  Pt expressed after prior session she had a significant increase in pain 9/10 , that lasted several days and interfered with her ADL's and ability to drive. She reported at that time having radicular symptoms that extended to just distal R knee meidally and laterally    Exercises:  Exercise/Equipment Resistance/Repetitions Other comments    /stepper 5 mins      Hamstring stretch 15 sec x 3 reps B      Hip flexor/quad stretch TBA      Figure 4 stretch 15 sec x 3 reps R      SKTC       Quad sets 5 sec x 10 reps      Glut sets 5 sec x 10 reps      SLR       SAQ 3 sec x 10 reps         Sit to stand               Standing extension @ wall  5 sec x 3 reps  No increased pain or radicular symptoms                                                      Other   Pt reported no radicular symptoms or exacerbation of pain during or after session today.   Will monitor symptoms     Home Exercise Program:  N/A    Manual Treatments:  N/A    Modalities:  N/A     Time-in Time-out Total Time   67198  Evaluation Low Complexity      73729  Evaluation Med Complexity      23376  Evaluation High Complexity      85188  Ther Ex 1123 1152 29   81177  Neuro Re-ed        75811  Ther Activities        51103  Manual Therapy       84043  E-stim       89685  Ultrasound            Session 5543 9255 07       Treatment/Activity

## 2022-03-24 ENCOUNTER — OFFICE VISIT (OUTPATIENT)
Dept: FAMILY MEDICINE CLINIC | Age: 67
End: 2022-03-24
Payer: MEDICARE

## 2022-03-24 VITALS
TEMPERATURE: 98.8 F | BODY MASS INDEX: 33.75 KG/M2 | DIASTOLIC BLOOD PRESSURE: 66 MMHG | OXYGEN SATURATION: 97 % | WEIGHT: 172.8 LBS | SYSTOLIC BLOOD PRESSURE: 146 MMHG | HEART RATE: 82 BPM

## 2022-03-24 DIAGNOSIS — E03.9 ACQUIRED HYPOTHYROIDISM: ICD-10-CM

## 2022-03-24 DIAGNOSIS — E78.2 MIXED HYPERLIPIDEMIA: ICD-10-CM

## 2022-03-24 DIAGNOSIS — E55.9 VITAMIN D DEFICIENCY: ICD-10-CM

## 2022-03-24 DIAGNOSIS — R73.9 ELEVATED SERUM GLUCOSE: ICD-10-CM

## 2022-03-24 DIAGNOSIS — I10 ESSENTIAL HYPERTENSION: ICD-10-CM

## 2022-03-24 DIAGNOSIS — I10 ESSENTIAL HYPERTENSION: Primary | ICD-10-CM

## 2022-03-24 PROBLEM — M85.89 OSTEOPENIA OF MULTIPLE SITES: Status: ACTIVE | Noted: 2022-03-24

## 2022-03-24 LAB
ALBUMIN SERPL-MCNC: 4.4 G/DL (ref 3.5–5.2)
ALP BLD-CCNC: 84 U/L (ref 35–104)
ALT SERPL-CCNC: 23 U/L (ref 0–32)
ANION GAP SERPL CALCULATED.3IONS-SCNC: 17 MMOL/L (ref 7–16)
AST SERPL-CCNC: 31 U/L (ref 0–31)
BASOPHILS ABSOLUTE: 0.07 E9/L (ref 0–0.2)
BASOPHILS RELATIVE PERCENT: 1 % (ref 0–2)
BILIRUB SERPL-MCNC: 0.5 MG/DL (ref 0–1.2)
BUN BLDV-MCNC: 10 MG/DL (ref 6–23)
CALCIUM SERPL-MCNC: 10 MG/DL (ref 8.6–10.2)
CHLORIDE BLD-SCNC: 99 MMOL/L (ref 98–107)
CHOLESTEROL, TOTAL: 191 MG/DL (ref 0–199)
CO2: 23 MMOL/L (ref 22–29)
CREAT SERPL-MCNC: 1.2 MG/DL (ref 0.5–1)
CREATININE URINE: 85 MG/DL (ref 29–226)
EOSINOPHILS ABSOLUTE: 0.07 E9/L (ref 0.05–0.5)
EOSINOPHILS RELATIVE PERCENT: 1 % (ref 0–6)
GFR AFRICAN AMERICAN: 54
GFR NON-AFRICAN AMERICAN: 45 ML/MIN/1.73
GLUCOSE BLD-MCNC: 109 MG/DL (ref 74–99)
HBA1C MFR BLD: 6 % (ref 4–5.6)
HCT VFR BLD CALC: 44.9 % (ref 34–48)
HDLC SERPL-MCNC: 55 MG/DL
HEMOGLOBIN: 14.4 G/DL (ref 11.5–15.5)
IMMATURE GRANULOCYTES #: 0.02 E9/L
IMMATURE GRANULOCYTES %: 0.3 % (ref 0–5)
LDL CHOLESTEROL CALCULATED: 111 MG/DL (ref 0–99)
LYMPHOCYTES ABSOLUTE: 2.26 E9/L (ref 1.5–4)
LYMPHOCYTES RELATIVE PERCENT: 32.6 % (ref 20–42)
MCH RBC QN AUTO: 27.6 PG (ref 26–35)
MCHC RBC AUTO-ENTMCNC: 32.1 % (ref 32–34.5)
MCV RBC AUTO: 86.2 FL (ref 80–99.9)
MICROALBUMIN UR-MCNC: <12 MG/L
MICROALBUMIN/CREAT UR-RTO: ABNORMAL (ref 0–30)
MONOCYTES ABSOLUTE: 0.6 E9/L (ref 0.1–0.95)
MONOCYTES RELATIVE PERCENT: 8.6 % (ref 2–12)
NEUTROPHILS ABSOLUTE: 3.92 E9/L (ref 1.8–7.3)
NEUTROPHILS RELATIVE PERCENT: 56.5 % (ref 43–80)
PDW BLD-RTO: 13.1 FL (ref 11.5–15)
PLATELET # BLD: 341 E9/L (ref 130–450)
PMV BLD AUTO: 10.6 FL (ref 7–12)
POTASSIUM SERPL-SCNC: 3.6 MMOL/L (ref 3.5–5)
RBC # BLD: 5.21 E12/L (ref 3.5–5.5)
SODIUM BLD-SCNC: 139 MMOL/L (ref 132–146)
TOTAL PROTEIN: 7.4 G/DL (ref 6.4–8.3)
TRIGL SERPL-MCNC: 124 MG/DL (ref 0–149)
TSH SERPL DL<=0.05 MIU/L-ACNC: 0.09 UIU/ML (ref 0.27–4.2)
VLDLC SERPL CALC-MCNC: 25 MG/DL
WBC # BLD: 6.9 E9/L (ref 4.5–11.5)

## 2022-03-24 PROCEDURE — 1090F PRES/ABSN URINE INCON ASSESS: CPT | Performed by: FAMILY MEDICINE

## 2022-03-24 PROCEDURE — G8484 FLU IMMUNIZE NO ADMIN: HCPCS | Performed by: FAMILY MEDICINE

## 2022-03-24 PROCEDURE — 3017F COLORECTAL CA SCREEN DOC REV: CPT | Performed by: FAMILY MEDICINE

## 2022-03-24 PROCEDURE — G8427 DOCREV CUR MEDS BY ELIG CLIN: HCPCS | Performed by: FAMILY MEDICINE

## 2022-03-24 PROCEDURE — G8399 PT W/DXA RESULTS DOCUMENT: HCPCS | Performed by: FAMILY MEDICINE

## 2022-03-24 PROCEDURE — 1036F TOBACCO NON-USER: CPT | Performed by: FAMILY MEDICINE

## 2022-03-24 PROCEDURE — 4040F PNEUMOC VAC/ADMIN/RCVD: CPT | Performed by: FAMILY MEDICINE

## 2022-03-24 PROCEDURE — 99214 OFFICE O/P EST MOD 30 MIN: CPT | Performed by: FAMILY MEDICINE

## 2022-03-24 PROCEDURE — G8417 CALC BMI ABV UP PARAM F/U: HCPCS | Performed by: FAMILY MEDICINE

## 2022-03-24 PROCEDURE — 1123F ACP DISCUSS/DSCN MKR DOCD: CPT | Performed by: FAMILY MEDICINE

## 2022-03-24 RX ORDER — AMLODIPINE BESYLATE 5 MG/1
5 TABLET ORAL DAILY
Qty: 90 TABLET | Refills: 0 | Status: SHIPPED
Start: 2022-03-24 | End: 2022-06-22

## 2022-03-24 ASSESSMENT — ENCOUNTER SYMPTOMS
ABDOMINAL PAIN: 0
NAUSEA: 0
SHORTNESS OF BREATH: 1
COUGH: 0
CONSTIPATION: 0
DIARRHEA: 0
WHEEZING: 1
EYE PAIN: 0
BLOOD IN STOOL: 0
VOMITING: 0

## 2022-03-24 NOTE — PROGRESS NOTES
3/24/2022    Harmony Patel is a 78 y.o. female here for:    Chief Complaint   Patient presents with    Hypertension     1 month follow-up    Groin Pain     1 month follow-up        HPI:  HTN   - On benazepril-HCTZ 20-12.5 mg QD. Reports compliance with medication. Denies side effects of medication.   - Denies chest pain, lightheadedness, dizziness, double vision, and blurry vision. Groin pain   From appointment on 02/22/2022:  - Started in November 2021 after getting out of her  truck. Patient states that she pulled a muscle. - Affects right groin. Does not radiate. - Occurs daily but varies in severity.   - Describes pain as a pressure and an ache. - Better with nothing.   - Has not tried anything additional to help with her pain. She follows with Pain Management. Patient is on Norco  mg q 8 hours PRN for neck and shoulder pain. - Worse with getting into and out of her truck. - Pain is 6/10 on pain scale. - Admits to weakness of right lower extremity. \"It feels like my leg is going to give out on me. \"   - Denies numbness and tingling.   - Going to PT. Has had 2 sessions thus far. Patient is scheduled once weekly.      Current Outpatient Medications   Medication Sig Dispense Refill    amLODIPine (NORVASC) 5 MG tablet Take 1 tablet by mouth daily 90 tablet 0    montelukast (SINGULAIR) 10 MG tablet Take 1 tablet by mouth nightly 30 tablet 2    Cholecalciferol (VITAMIN D3) 1.25 MG (79799 UT) CAPS Take 1 capsule by mouth once a week 4 capsule 2    levothyroxine (SYNTHROID) 100 MCG tablet Take 1 tablet by mouth daily 30 tablet 2    rOPINIRole (REQUIP) 1 MG tablet Take 1 tablet by mouth nightly 30 tablet 2    benazepril-hydrochlorthiazide (LOTENSIN HCT) 20-12.5 MG per tablet Take 1 tablet by mouth daily 30 tablet 2    TRELEGY ELLIPTA 100-62.5-25 MCG/INH AEPB Inhale 1 puff into the lungs daily 3 each 3    PROVENTIL  (90 Base) MCG/ACT inhaler Inhale 2 puffs into the lungs every 6 hours as needed for Wheezing (wheezing) Wants the proventil. Will pay out of pocket. (Patient taking differently: Inhale 2 puffs into the lungs every 6 hours as needed for Wheezing (wheezing) ) 1 each 3    simvastatin (ZOCOR) 40 MG tablet Take 1 tablet by mouth every evening (Patient taking differently: Take 40 mg by mouth once a week ) 90 tablet 1    citalopram (CELEXA) 20 MG tablet Take 20 mg by mouth daily       traZODone (DESYREL) 100 MG tablet Take 100 mg by mouth every evening      Coenzyme Q10 (COQ-10) 10 MG CAPS Take 1 capsule by mouth daily      fluticasone (FLONASE) 50 MCG/ACT nasal spray 2 sprays by Nasal route daily 1 Bottle 3    TL STANLEY RX 2.2-25-1 MG TABS tablet Take 1 tablet by mouth daily  1    lidocaine (LIDODERM) 5 % Place 1 patch onto the skin 2 times daily 12 hours on, 12 hours off. 60 patch 5    omeprazole (PRILOSEC) 40 MG capsule Take 1 capsule by mouth daily. 30 capsule 3    HYDROcodone-acetaminophen (NORCO)  MG per tablet Take 1 tablet by mouth every 8 hours as needed.  LORazepam (ATIVAN) 1 MG tablet Take 1 mg by mouth in the morning and at bedtime. No current facility-administered medications for this visit.       Allergies   Allergen Reactions    Celebrex [Celecoxib]     Simvastatin     Avelox [Moxifloxacin Hcl In Nacl]     Biaxin [Clarithromycin]     Clarithromycin     Desvenlafaxine     Lipitor [Atorvastatin]     Lyrica [Pregabalin]     Tetracyclines & Related     Zithromax [Azithromycin]     Sulfa Antibiotics Rash    Vioxx [Rofecoxib] Nausea And Vomiting       Past Medical & Surgical History:      Diagnosis Date    Allergic rhinitis     Chronic pain syndrome     [facility], Dr. Yareli Shay    COPD (chronic obstructive pulmonary disease) ([facility] [address]     follows with Pulmonology, Dr. Kelli Wilson    GERD (gastroesophageal reflux disease)     Grade I diastolic dysfunction 3054    Hernia, hiatal 12/14/2017    Hyperlipidemia     Hypertension     Hypothyroidism 2017    Irritable bowel     Ischemic colitis ([facility] [address]     Mild tricuspid regurgitation     Osteopenia of multiple sites     Overactive bladder     follows with Urology, Dr. Gio Morales    Pneumonia     Pulmonary HTN ([facility] [address]     mild    Recurrent major depressive disorder ([facility] [address] 2017    follows with Psychiatry, Dr. Marietta Sousa    Restless legs syndrome     Vitamin D deficiency      Past Surgical History:   Procedure Laterality Date    APPENDECTOMY N/A     CARDIOVASCULAR STRESS TEST N/A 2006    Dr. Sherry East; EF=70%    CHOLECYSTECTOMY N/A     COLONOSCOPY N/A 2013    Dr. David Bob; ischemic colitis involving the descending and sigmoid colon, diverticulosis of the sigmoid colon    COLONOSCOPY N/A 2008    Dr. Tamiko Gregory; internal hemorrhoids and spastic colon    ECHO COMPLETE N/A 2013    Dr. Luis Carlos Moyer; EF >54%-GPBCH I diastolic dysfunction, with mild TR and mild pulm HTN    TONSILLECTOMY Bilateral     UPPER GASTROINTESTINAL ENDOSCOPY N/A 2013    Dr. David Bob; gastritis    UPPER GASTROINTESTINAL ENDOSCOPY N/A 11/15/2011       Family History:      Problem Relation Age of Onset    Thyroid Disease Mother     Hypertension Mother    Estefania Garcia Father         with metastasis to his brain    Pacemaker Brother     COPD Brother     Obesity Maternal Grandmother     Heart Disease Paternal Grandmother     Breast Cancer Paternal Aunt     Breast Cancer Paternal Aunt        Social History:  Social History     Tobacco Use    Smoking status: Former Smoker     Packs/day: 1.00     Years: 45.00     Pack years: 45.00     Types: Cigarettes     Start date:      Quit date:      Years since quittin.2    Smokeless tobacco: Never Used   Substance Use Topics    Alcohol use: No       Review of Systems   Constitutional: Negative for chills and fever. Eyes: Negative for pain and visual disturbance.    Respiratory: Positive for shortness of breath and wheezing (on occasion). Negative for cough. Cardiovascular: Positive for palpitations. Negative for chest pain and leg swelling. Gastrointestinal: Negative for abdominal pain, blood in stool, constipation, diarrhea, nausea and vomiting. Neurological: Negative for dizziness, syncope and light-headedness. BP Readings from Last 3 Encounters:   03/24/22 (!) 146/66   02/22/22 (!) 168/88   02/21/22 135/87       VS:  BP (!) 146/66 (Site: Right Upper Arm, Position: Sitting, Cuff Size: Large Adult)   Pulse 82   Temp 98.8 °F (37.1 °C)   Wt 172 lb 12.8 oz (78.4 kg)   SpO2 97%   BMI 33.75 kg/m²     Physical Exam  Vitals reviewed. Constitutional:       General: She is awake. She is not in acute distress. Appearance: She is well-developed and well-groomed. She is obese. She is not ill-appearing, toxic-appearing or diaphoretic. Neck:      Vascular: No carotid bruit. Cardiovascular:      Rate and Rhythm: Normal rate and regular rhythm. Heart sounds: S1 normal and S2 normal. Murmur heard. Pulmonary:      Breath sounds: Decreased air movement present. Decreased breath sounds present. No wheezing, rhonchi or rales. Abdominal:      General: Bowel sounds are normal. There is no distension. Palpations: Abdomen is soft. Tenderness: There is no abdominal tenderness. There is no guarding or rebound. Musculoskeletal:      Cervical back: Neck supple. Right lower leg: No tenderness. No edema. Left lower leg: No tenderness. No edema. Skin:     General: Skin is warm and dry. Neurological:      General: No focal deficit present. Mental Status: She is alert. Psychiatric:         Attention and Perception: Attention normal.         Mood and Affect: Mood normal.         Speech: Speech normal.         Behavior: Behavior normal. Behavior is cooperative. Thought Content:  Thought content normal.         Cognition and Memory: Cognition normal.         Judgment: Judgment normal.         Assessment/Plan:  1. Essential hypertension  Uncontrolled. Continue benazepril-HCTZ 20-12.5 mg QD. Add amlodipine 5 mg QD. Follow-up in 1 month. Check labs. - CBC with Auto Differential; Future  - Comprehensive Metabolic Panel; Future  - MICROALBUMIN / CREATININE URINE RATIO; Future  - amLODIPine (NORVASC) 5 MG tablet; Take 1 tablet by mouth daily  Dispense: 90 tablet; Refill: 0    2. Elevated serum glucose  - Hemoglobin A1C; Future    3. Mixed hyperlipidemia  - LIPID PANEL; Future    4. Acquired hypothyroidism  - TSH; Future    5. Vitamin D deficiency  - Vitamin D 25 Hydroxy; Future        Return in about 29 days (around 4/22/2022) for follow-up for HTN, HLD, hypothyroidism, RLS .       Bart Love, DO  Family Medicine

## 2022-03-25 DIAGNOSIS — E03.9 ACQUIRED HYPOTHYROIDISM: Primary | ICD-10-CM

## 2022-03-25 PROBLEM — R73.03 PREDIABETES: Status: ACTIVE | Noted: 2022-03-24

## 2022-03-25 PROBLEM — N18.31 STAGE 3A CHRONIC KIDNEY DISEASE (HCC): Status: ACTIVE | Noted: 2022-03-25

## 2022-03-25 LAB — VITAMIN D 25-HYDROXY: 88 NG/ML (ref 30–100)

## 2022-03-25 RX ORDER — LEVOTHYROXINE SODIUM 88 UG/1
88 TABLET ORAL DAILY
Qty: 90 TABLET | Refills: 0 | Status: SHIPPED
Start: 2022-03-25 | End: 2022-06-22

## 2022-03-28 ENCOUNTER — TELEPHONE (OUTPATIENT)
Dept: FAMILY MEDICINE CLINIC | Age: 67
End: 2022-03-28

## 2022-03-28 NOTE — TELEPHONE ENCOUNTER
Called patient back to give her Tax ID so she could call insurance. She stated that insurance company told her for Dr. Bynum to call insurance provider line at 1 [phone]. She stated they needed information from her. She's states that she was upset all weekend about this thinking she has to find another doctor and they will not pay her office visit. States that's she confused about this. No one told her that Dr. Bynum is not on this insurance.

## 2022-03-28 NOTE — TELEPHONE ENCOUNTER
Pt called back stating that insurance told her doctor needs to call insurance so that the provider profile can be updated. They state that she has [address] through [employer]. It will take a couple of days to update after the profile is done. She also states that she has PT on Wednesday that you referred her to and that she is going to call to postpone it for next week because she doesn't want it not to be covered.

## 2022-03-28 NOTE — TELEPHONE ENCOUNTER
Sent a message to [facility] ([address]) to handle per Paco Florez.

## 2022-03-28 NOTE — TELEPHONE ENCOUNTER
I talked to Lavinia. She said you should be on this and that happened once before. [facility] needs to look at an updated list of doctors. Relayed this information to patient along with our Tax ID. Told patient to call insurance again and give them this Tax ID number and also tell them to look at an updated list of doctors as Dr. Bynum started in the middle of last year and may not have been listed at the time on their listing of doctors. Patient is to call back if she has anymore problems with insurance.

## 2022-04-06 ENCOUNTER — TREATMENT (OUTPATIENT)
Dept: PHYSICAL THERAPY | Age: 67
End: 2022-04-06
Payer: MEDICARE

## 2022-04-06 ENCOUNTER — TELEPHONE (OUTPATIENT)
Dept: PULMONOLOGY | Age: 67
End: 2022-04-06

## 2022-04-06 DIAGNOSIS — G89.29 CHRONIC GROIN PAIN, RIGHT: Primary | ICD-10-CM

## 2022-04-06 DIAGNOSIS — R10.31 CHRONIC GROIN PAIN, RIGHT: Primary | ICD-10-CM

## 2022-04-06 PROCEDURE — 97110 THERAPEUTIC EXERCISES: CPT

## 2022-04-06 NOTE — PROGRESS NOTES
[facility]          Phone: [phone] Fax: [phone]    Physical Therapy Daily Treatment Note  Date:  2022    Patient Name:  Antonino Rodriguez    :  1955  MRN: [id number]    Evaluating Therapist:  Sherley Dennis, VICKY [id number]  Restrictions/Precautions:    Diagnosis:     Diagnosis Orders   1. Chronic groin pain, right       Treatment Diagnosis:    Insurance/Certification information:  [facility] Medicare  Referring Physician:  Vilma Wills DO  Plan of care signed (Y/N):    Visit# / total visits:  -  Pain level: 6/10   Time In:  1119  Time Out:  1145    Subjective:  Pt said she felt much better after last session, but then she \" over did it \" yesterday. Pt did not specify what she did. Exercises:  Exercise/Equipment Resistance/Repetitions Other comments    /stepper 5 mins      Hamstring stretch 15 sec x 3 reps B      Hip flexor/quad stretch TBA      Figure 4 stretch 15 sec x 3 reps R      SKTC 15 sec x 3 reps B Tolerated today      Quad sets 5 sec x 10 reps      Glut sets 5 sec x 10 reps      SLR       SAQ 3 sec x 10 reps B        Sit to stand               Standing extension @ wall  10 sec x 5 reps  No increased pain or radicular symptoms                                                      Other   Pt reported no radicular symptoms or exacerbation of pain during or after session today.   Will monitor symptoms and progress as tolerated    Home Exercise Program:  N/A    Manual Treatments:  N/A    Modalities:  N/A     Time-in Time-out Total Time   68504  Evaluation Low Complexity      52628  Evaluation Med Complexity      12764  Evaluation High Complexity      67560  Ther Ex 1119 1145 26   37375  Neuro Re-ed        69090  Ther Activities        94088  Manual Therapy       43330  E-stim       55116  Ultrasound            Session 8652 [id number]       Treatment/Activity Tolerance:  [] Patient tolerated treatment well [] Patient limited by fatigue  [x] Patient limited by pain  [] Patient limited by other medical complications  [] Other:     Prognosis: [x] Good [] Fair  [] Poor    Patient Requires Follow-up: [x] Yes  [] No    Plan:   [x] Continue per plan of care [] Alter current plan (see comments)  [] Plan of care initiated [] Hold pending MD visit [] Discharge  Plan for Next Session:        Electronically signed by:  Maria Teresa Guzmán, PTA [id number]

## 2022-04-06 NOTE — TELEPHONE ENCOUNTER
Mailed a letter to patient informing her that her CT Chest is scheduled for 5-18-22 at 3:00 pm at the [facility]. She must arrive by 2:30 pm. There is no prep for this test

## 2022-04-13 ENCOUNTER — TREATMENT (OUTPATIENT)
Dept: PHYSICAL THERAPY | Age: 67
End: 2022-04-13
Payer: MEDICARE

## 2022-04-13 DIAGNOSIS — R10.31 CHRONIC GROIN PAIN, RIGHT: Primary | ICD-10-CM

## 2022-04-13 DIAGNOSIS — G89.29 CHRONIC GROIN PAIN, RIGHT: Primary | ICD-10-CM

## 2022-04-13 PROCEDURE — 97110 THERAPEUTIC EXERCISES: CPT

## 2022-04-13 NOTE — PROGRESS NOTES
[facility]          Phone: [phone] Fax: [phone]    Physical Therapy Daily Treatment Note  Date:  2022    Patient Name:  Fabiola Rushing    :  1955  MRN: [id number]    Evaluating Therapist:  Denton Mares PT [id number]  Restrictions/Precautions:    Diagnosis:     Diagnosis Orders   1. Chronic groin pain, right       Treatment Diagnosis:    Insurance/Certification information:  [facility] Medicare  Referring Physician:  Katina Tsai DO  Plan of care signed (Y/N):    Visit# / total visits:  -  Pain level: 4/10   Time In:  1255  Time Out:  1328    Subjective:  Pt said she felt much better after last session    Exercises:  Exercise/Equipment Resistance/Repetitions Other comments    /stepper 8 mins      Hamstring stretch 15 sec x 3 reps B  HEP     Hip flexor/quad stretch 15 sec x 3 reps R  HEP     Figure 4 stretch 15 sec x 3 reps R  HEP     SKTC 15 sec x 3 reps B Tolerated today      Quad sets 5 sec x 10 reps  HEP     Glut sets 5 sec x 10 reps  HEP     SLR       SAQ 3 sec x 10 reps B        Sit to stand               Standing extension @ wall  10 sec x 5 reps  No increased pain or radicular symptoms                                                      Other   Pt reported no radicular symptoms or exacerbation of pain during or after session today. Will monitor symptoms and progress as tolerated    Home Exercise Program:   HEP reviewed and provided to pt.  Pt demonstrated good technique to therapist    Manual Treatments:  N/A    Modalities:  N/A     Time-in Time-out Total Time   97521  Evaluation Low Complexity      78149  Evaluation Med Complexity      31876  Evaluation High Complexity      56378  Ther Ex 3570 7810 85   71267  Neuro Re-ed        33660  Ther Activities        66583  Manual Therapy       45047  E-stim       30956  Ultrasound            Session 5365 [id number]       Treatment/Activity Tolerance:  [x] Patient tolerated treatment well [] Patient limited by fatigue  [] Patient limited by pain  [] Patient limited by other medical complications  [] Other:     Prognosis: [x] Good [] Fair  [] Poor    Patient Requires Follow-up: [x] Yes  [] No    Plan:   [x] Continue per plan of care [] Alter current plan (see comments)  [] Plan of care initiated [] Hold pending MD visit [] Discharge  Plan for Next Session:        Electronically signed by:  Migue Milligan, PTA [id number]

## 2022-04-20 ENCOUNTER — TREATMENT (OUTPATIENT)
Dept: PHYSICAL THERAPY | Age: 67
End: 2022-04-20

## 2022-04-21 DIAGNOSIS — J43.1 PANLOBULAR EMPHYSEMA (HCC): Primary | ICD-10-CM

## 2022-04-21 RX ORDER — ALBUTEROL SULFATE 90 MCG
2 HFA AEROSOL WITH ADAPTER (GRAM) INHALATION EVERY 6 HOURS PRN
Qty: 1 EACH | Refills: 3 | Status: SHIPPED
Start: 2022-04-21 | End: 2022-08-08 | Stop reason: SDUPTHER

## 2022-04-22 ENCOUNTER — OFFICE VISIT (OUTPATIENT)
Dept: FAMILY MEDICINE CLINIC | Age: 67
End: 2022-04-22
Payer: MEDICARE

## 2022-04-22 VITALS
OXYGEN SATURATION: 97 % | SYSTOLIC BLOOD PRESSURE: 118 MMHG | TEMPERATURE: 97.4 F | DIASTOLIC BLOOD PRESSURE: 70 MMHG | BODY MASS INDEX: 32.61 KG/M2 | WEIGHT: 167 LBS | HEART RATE: 65 BPM

## 2022-04-22 DIAGNOSIS — Z12.11 COLON CANCER SCREENING: ICD-10-CM

## 2022-04-22 DIAGNOSIS — I10 ESSENTIAL HYPERTENSION: Primary | ICD-10-CM

## 2022-04-22 DIAGNOSIS — M85.80 OSTEOPENIA, UNSPECIFIED LOCATION: ICD-10-CM

## 2022-04-22 DIAGNOSIS — G25.81 RESTLESS LEGS SYNDROME: ICD-10-CM

## 2022-04-22 DIAGNOSIS — E03.9 ACQUIRED HYPOTHYROIDISM: ICD-10-CM

## 2022-04-22 DIAGNOSIS — E78.2 MIXED HYPERLIPIDEMIA: ICD-10-CM

## 2022-04-22 DIAGNOSIS — Z78.0 POSTMENOPAUSE: ICD-10-CM

## 2022-04-22 DIAGNOSIS — K21.9 GASTROESOPHAGEAL REFLUX DISEASE, UNSPECIFIED WHETHER ESOPHAGITIS PRESENT: ICD-10-CM

## 2022-04-22 PROCEDURE — 3017F COLORECTAL CA SCREEN DOC REV: CPT | Performed by: FAMILY MEDICINE

## 2022-04-22 PROCEDURE — 4040F PNEUMOC VAC/ADMIN/RCVD: CPT | Performed by: FAMILY MEDICINE

## 2022-04-22 PROCEDURE — G8417 CALC BMI ABV UP PARAM F/U: HCPCS | Performed by: FAMILY MEDICINE

## 2022-04-22 PROCEDURE — 1123F ACP DISCUSS/DSCN MKR DOCD: CPT | Performed by: FAMILY MEDICINE

## 2022-04-22 PROCEDURE — G8427 DOCREV CUR MEDS BY ELIG CLIN: HCPCS | Performed by: FAMILY MEDICINE

## 2022-04-22 PROCEDURE — 1090F PRES/ABSN URINE INCON ASSESS: CPT | Performed by: FAMILY MEDICINE

## 2022-04-22 PROCEDURE — 1036F TOBACCO NON-USER: CPT | Performed by: FAMILY MEDICINE

## 2022-04-22 PROCEDURE — G8399 PT W/DXA RESULTS DOCUMENT: HCPCS | Performed by: FAMILY MEDICINE

## 2022-04-22 PROCEDURE — 99214 OFFICE O/P EST MOD 30 MIN: CPT | Performed by: FAMILY MEDICINE

## 2022-04-22 RX ORDER — CITALOPRAM 30 MG/1
30 CAPSULE ORAL DAILY
COMMUNITY

## 2022-04-22 RX ORDER — SIMVASTATIN 40 MG
40 TABLET ORAL
COMMUNITY
End: 2022-07-18

## 2022-04-22 ASSESSMENT — ENCOUNTER SYMPTOMS
COUGH: 0
SHORTNESS OF BREATH: 1
BLOOD IN STOOL: 0
DIARRHEA: 0
EYE PAIN: 0
CONSTIPATION: 0
ABDOMINAL PAIN: 0
NAUSEA: 0
WHEEZING: 1
VOMITING: 0

## 2022-04-22 NOTE — PROGRESS NOTES
4/22/2022    Amanda Wallace is a 75 y.o. female here for:    Chief Complaint   Patient presents with    Hypertension    Hypothyroidism    Hyperlipidemia    Other     RLS    Gastroesophageal Reflux        HPI:  HTN   - On amlodipine 5 mg QD and benazepril-HCTZ 20-12.5 mg QD. Reports compliance with medications. Denies side effects of medications. - Denies chest pain, dizziness, lightheadedness, and syncope. Hypothyroidism   - On Synthroid 88 mcg QD. Reports compliance with medication. Denies side effects of medication. HLD   - On simvastatin 40 mg three times per week. Denies side effects of medication.  - Cannot tolerate simvastatin daily due to myalgias. - Diet: egg salad sandwiches, Lean Cuisine butternut squash ravioli, Belvita snacks, cottage cheese w/ fruit, french fries, and pizza   The 10-year ASCVD risk score (Ruba Ferris, et al., 2013) is: 8.6%    Values used to calculate the score:      Age: 75 years      Sex: Female      Is Non- : No      Diabetic: No      Tobacco smoker: No      Systolic Blood Pressure: 153 mmHg      Is BP treated: Yes      HDL Cholesterol: 55 mg/dL      Total Cholesterol: 191 mg/dL       RLS  - On Requip 1 mg HS. Reports compliance with medication. Denies side effects of medication.  - Patient states that her RLS is well-controlled on current dose of Requip. GERD  - On omeprazole 40 mg PRN (about 4 times per week). She takes this medication only before eating spicy food. - Denies side effects of medication.   - Denies dysphagia, odynophagia, heartburn, indigestion, and abdominal pain.     Current Outpatient Medications   Medication Sig Dispense Refill    citalopram (CELEXA) 10 MG tablet Take 30 mg by mouth daily      simvastatin (ZOCOR) 40 MG tablet Take 40 mg by mouth three times a week      PROVENTIL  (90 Base) MCG/ACT inhaler Inhale 2 puffs into the lungs every 6 hours as needed for Wheezing (wheezing) Wants the proventil. Will pay out of pocket. 1 each 3    levothyroxine (SYNTHROID) 88 MCG tablet Take 1 tablet by mouth daily 90 tablet 0    amLODIPine (NORVASC) 5 MG tablet Take 1 tablet by mouth daily 90 tablet 0    montelukast (SINGULAIR) 10 MG tablet Take 1 tablet by mouth nightly 30 tablet 2    Cholecalciferol (VITAMIN D3) 1.25 MG (80987 UT) CAPS Take 1 capsule by mouth once a week 4 capsule 2    rOPINIRole (REQUIP) 1 MG tablet Take 1 tablet by mouth nightly 30 tablet 2    benazepril-hydrochlorthiazide (LOTENSIN HCT) 20-12.5 MG per tablet Take 1 tablet by mouth daily 30 tablet 2    TRELEGY ELLIPTA 100-62.5-25 MCG/INH AEPB Inhale 1 puff into the lungs daily 3 each 3    traZODone (DESYREL) 100 MG tablet Take 100 mg by mouth every evening      Coenzyme Q10 (COQ-10) 10 MG CAPS Take 1 capsule by mouth daily      fluticasone (FLONASE) 50 MCG/ACT nasal spray 2 sprays by Nasal route daily 1 Bottle 3    TL STANLEY RX 2.2-25-1 MG TABS tablet Take 1 tablet by mouth daily  1    lidocaine (LIDODERM) 5 % Place 1 patch onto the skin 2 times daily 12 hours on, 12 hours off. 60 patch 5    omeprazole (PRILOSEC) 40 MG capsule Take 1 capsule by mouth daily. (Patient taking differently: Take 40 mg by mouth daily as needed ) 30 capsule 3    HYDROcodone-acetaminophen (NORCO)  MG per tablet Take 1 tablet by mouth every 8 hours as needed.  LORazepam (ATIVAN) 1 MG tablet Take 1 mg by mouth in the morning and at bedtime. No current facility-administered medications for this visit.       Allergies   Allergen Reactions    Celebrex [Celecoxib]     Simvastatin     Avelox [Moxifloxacin Hcl In Nacl]     Biaxin [Clarithromycin]     Clarithromycin     Desvenlafaxine     Lipitor [Atorvastatin]     Lyrica [Pregabalin]     Tetracyclines & Related     Zithromax [Azithromycin]     Sulfa Antibiotics Rash    Vioxx [Rofecoxib] Nausea And Vomiting       Past Medical & Surgical History:      Diagnosis Date    Allergic rhinitis     Chronic pain syndrome     follows [facility], Dr. Matteo Palomo COPD (chronic obstructive pulmonary disease) [facility])     follows with Pulmonology, Dr. Jb Sams    GERD (gastroesophageal reflux disease)     Grade I diastolic dysfunction 6278    Hernia, hiatal 12/14/2017    Hyperlipidemia     Hypertension     Hypothyroidism 04/07/2017    Irritable bowel     [facility]schemic colitis [facility]     Mild tricuspid regurgitation 2013    Osteopenia of multiple sites     Overactive bladder     follows with Urology, Dr. Nohemi Oliveira    Pneumonia     Prediabetes 03/24/2022    Pulmonary HTN ([facility] 2013    mild    Recurrent major depressive disorder ([facility] 12/14/2017    follows with Psychiatry, Dr. Kobe Bustillo    Restless legs syndrome     Stage 3a chronic kidney disease ([facility]     Vitamin D deficiency      Past Surgical History:   Procedure Laterality Date    APPENDECTOMY N/A     CARDIOVASCULAR STRESS TEST N/A 02/2006    Dr. Denae Rodas; EF=70%    CHOLECYSTECTOMY N/A     COLONOSCOPY N/A 02/20/2013    Dr. Jordana Stephens; ischemic colitis involving the descending and sigmoid colon, diverticulosis of the sigmoid colon    COLONOSCOPY N/A 05/12/2008    Dr. David Li; internal hemorrhoids and spastic colon    ECHO COMPLETE N/A 02/19/2013    Dr. Murriel Bumpers; EF >24%-GAOKH I diastolic dysfunction, with mild TR and mild pulm HTN    TONSILLECTOMY Bilateral     UPPER GASTROINTESTINAL ENDOSCOPY N/A 02/19/2013    Dr. Jordana Stephens; gastritis    UPPER GASTROINTESTINAL ENDOSCOPY N/A 11/15/2011       Family History:      Problem Relation Age of Onset    Thyroid Disease Mother     Hypertension Mother    Arlyne Range Father         with metastasis to his brain    Pacemaker Brother     COPD Brother     Obesity Maternal Grandmother     Heart Disease Paternal Grandmother     Breast Cancer Paternal Aunt     Breast Cancer Paternal Aunt        Social History:  Social History     Tobacco Use    Smoking status: Former Smoker     Packs/day: 1.00 Years: 45.00     Pack years: 45.00     Types: Cigarettes     Start date: 0     Quit date: 2020     Years since quittin.3    Smokeless tobacco: Never Used   Substance Use Topics    Alcohol use: No       Review of Systems   Constitutional: Negative for chills and fever. Eyes: Positive for visual disturbance (blurry vision on occasion). Negative for pain. Respiratory: Positive for shortness of breath (PMHx of COPD, follows with Pulmonology) and wheezing (on occasion). Negative for cough. Cardiovascular: Positive for palpitations (reports increased heart rate with physical activity, denies skipped beats). Negative for chest pain and leg swelling. Gastrointestinal: Negative for abdominal pain, blood in stool, constipation, diarrhea, nausea and vomiting. Genitourinary: Positive for frequency (PMHx of overactive bladder). Negative for dysuria, hematuria and urgency. Neurological: Negative for dizziness, syncope and light-headedness. Psychiatric/Behavioral: Negative for suicidal ideas. Denies homicidal ideation       BP Readings from Last 3 Encounters:   22 118/70   22 (!) 146/66   22 (!) 168/88       VS:  /70 (Site: Left Upper Arm, Position: Sitting, Cuff Size: Large Adult)   Pulse 65   Temp 97.4 °F (36.3 °C)   Wt 167 lb (75.8 kg)   SpO2 97%   BMI 32.61 kg/m²     Physical Exam  Vitals reviewed. Constitutional:       General: She is awake. She is not in acute distress. Appearance: She is well-developed and well-groomed. She is obese. She is not ill-appearing, toxic-appearing or diaphoretic. Neck:      Vascular: No carotid bruit. Cardiovascular:      Rate and Rhythm: Normal rate and regular rhythm. Heart sounds: S1 normal and S2 normal. No murmur heard. Pulmonary:      Breath sounds: Decreased air movement present. Decreased breath sounds present. No wheezing, rhonchi or rales.    Abdominal:      General: Bowel sounds are normal. There is no distension. Palpations: Abdomen is soft. There is no hepatomegaly or mass. Tenderness: There is no abdominal tenderness. There is no guarding or rebound. Musculoskeletal:      Cervical back: Neck supple. Right lower leg: No tenderness. No edema. Left lower leg: No tenderness. No edema. Skin:     General: Skin is warm and dry. Neurological:      General: No focal deficit present. Mental Status: She is alert. Psychiatric:         Attention and Perception: Attention normal.         Mood and Affect: Mood normal.         Speech: Speech normal.         Behavior: Behavior normal. Behavior is cooperative. Thought Content: Thought content normal.         Cognition and Memory: Cognition normal.         Judgment: Judgment normal.         Assessment/Plan:  1. Essential hypertension  Well-controlled. Continue amlodipine 5 mg QD and benazepril-HCTZ 20-12.5 mg QD. 2. Acquired hypothyroidism  Stable. Continue Synthroid 88 mcg QD. Last TSH from 03/24/2022 was 0.085 (out of range). Will need repeat TSH at [place] appointment with me. 3. Mixed hyperlipidemia  Stable. Continue simvastatin 40 mg three times weekly. 4. Restless legs syndrome  Well-controlled. Continue Requip 1 mg HS. 5. Gastroesophageal reflux disease, unspecified whether esophagitis present  Stable. Continue omeprazole 40 mg QD PRN. 6. Colon cancer screening  - Fecal DNA Colorectal cancer screening (Cologuard)    7. Postmenopause  - DEXA BONE DENSITY AXIAL SKELETON; Future    8. Osteopenia, unspecified location  - DEXA BONE DENSITY AXIAL SKELETON; Future        Return in about 7 weeks (around 6/10/2022) for discuss shortness of breath .       Tomasz Medina, DO  Family Medicine

## 2022-04-26 ENCOUNTER — HOSPITAL ENCOUNTER (OUTPATIENT)
Age: 67
Discharge: HOME | End: 2022-04-26
Payer: COMMERCIAL

## 2022-04-26 PROCEDURE — 93005 ELECTROCARDIOGRAM TRACING: CPT | Performed by: PSYCHIATRY & NEUROLOGY

## 2022-04-27 LAB
EKG ATRIAL RATE: 64 BPM
EKG P AXIS: 58 DEGREES
EKG P-R INTERVAL: 156 MS
EKG Q-T INTERVAL: 400 MS
EKG QRS DURATION: 80 MS
EKG QTC CALCULATION (BAZETT): 412 MS
EKG R AXIS: 57 DEGREES
EKG T AXIS: 54 DEGREES
EKG VENTRICULAR RATE: 64 BPM

## 2022-05-02 ENCOUNTER — HOSPITAL ENCOUNTER (OUTPATIENT)
Dept: MAMMOGRAPHY | Age: 67
Discharge: HOME | End: 2022-05-04
Payer: MEDICARE

## 2022-05-02 DIAGNOSIS — Z78.0 POSTMENOPAUSE: ICD-10-CM

## 2022-05-02 DIAGNOSIS — M85.80 OSTEOPENIA, UNSPECIFIED LOCATION: ICD-10-CM

## 2022-05-02 PROCEDURE — 77080 DXA BONE DENSITY AXIAL: CPT

## 2022-05-03 DIAGNOSIS — M85.89 OSTEOPENIA OF MULTIPLE SITES: Primary | ICD-10-CM

## 2022-05-03 RX ORDER — ALENDRONATE SODIUM 70 MG/1
70 TABLET ORAL
Qty: 12 TABLET | Refills: 1 | Status: SHIPPED | OUTPATIENT
Start: 2022-05-03

## 2022-05-13 DIAGNOSIS — G25.81 RESTLESS LEG SYNDROME: ICD-10-CM

## 2022-05-13 DIAGNOSIS — I10 ESSENTIAL HYPERTENSION: ICD-10-CM

## 2022-05-13 RX ORDER — BENAZEPRIL HYDROCHLORIDE AND HYDROCHLOROTHIAZIDE 20; 12.5 MG/1; MG/1
1 TABLET ORAL DAILY
Qty: 30 TABLET | Refills: 2 | Status: SHIPPED
Start: 2022-05-13 | End: 2022-08-25

## 2022-05-13 RX ORDER — ROPINIROLE 1 MG/1
1 TABLET, FILM COATED ORAL NIGHTLY
Qty: 30 TABLET | Refills: 2 | Status: SHIPPED
Start: 2022-05-13 | End: 2022-08-08 | Stop reason: SDUPTHER

## 2022-05-13 NOTE — TELEPHONE ENCOUNTER
Medication Refill Request    LOV 4/22/2022  NOV 6/7/2022    Lab Results   Component Value Date    CREATININE 1.2 (H) 03/24/2022

## 2022-05-18 ENCOUNTER — HOSPITAL ENCOUNTER (OUTPATIENT)
Dept: CT IMAGING | Age: 67
Discharge: HOME | End: 2022-05-20
Payer: MEDICARE

## 2022-05-18 DIAGNOSIS — Z87.891 PERSONAL HISTORY OF TOBACCO USE: ICD-10-CM

## 2022-05-18 PROCEDURE — 71271 CT THORAX LUNG CANCER SCR C-: CPT

## 2022-05-26 ENCOUNTER — TELEPHONE (OUTPATIENT)
Dept: PULMONOLOGY | Age: 67
End: 2022-05-26

## 2022-05-26 DIAGNOSIS — R91.8 PULMONARY NODULES: Primary | ICD-10-CM

## 2022-05-26 NOTE — TELEPHONE ENCOUNTER
Attempted to contact pt to review chest CT results. No answer, left message informing pt to return call to clinic.

## 2022-05-26 NOTE — TELEPHONE ENCOUNTER
Pt returned call to clinic and was informed her Chest CT was stable and Dr. Shon Medina has ordered a repeat chest CT to be completed in November. Pt verbalized understanding and denied any questions or concerns at this time.

## 2022-05-26 NOTE — PROGRESS NOTES
Date:  5/26/2022          Dear Dr. Mayito Hopper:       This is to inform you that, as per [facility] Physical Therapy department policy, your patient Anaid Gifford is as of todays date being discharged from Physical Therapy secondary to the following reasons:    1. If a client is absent for 50% of scheduled visits during a one-month period, he or she will be discharged from physical therapy services. A new prescription will be necessary to resume physical therapy. If you have any questions, please feel free to call at [phone]      Thank you          [address], [id number]    [phone]    The information contained in this Fax the designated recipients intend message only for the personal and confidential use named above. This information is to be handled as privileged and confidential.  If the reader of this message is not the intended recipient, you are hereby notified that you have received this document in error and that any review, dissemination, distribution or copying of this message is strictly prohibited. If you receive this communication in error, please notify us immediately at the above number and return the original to us by mail.   Thank you      [facility]  [address], [address] [address]

## 2022-06-13 DIAGNOSIS — E55.9 VITAMIN D DEFICIENCY: ICD-10-CM

## 2022-06-13 DIAGNOSIS — J43.1 PANLOBULAR EMPHYSEMA (HCC): ICD-10-CM

## 2022-06-13 RX ORDER — CITALOPRAM 30 MG/1
CAPSULE ORAL DAILY
COMMUNITY
Start: 2022-06-08

## 2022-06-13 RX ORDER — MONTELUKAST SODIUM 10 MG/1
10 TABLET ORAL NIGHTLY
Qty: 30 TABLET | Refills: 2 | Status: SHIPPED
Start: 2022-06-13 | End: 2022-09-14

## 2022-06-13 RX ORDER — CHOLECALCIFEROL (VITAMIN D3) 1250 MCG
1 CAPSULE ORAL WEEKLY
Qty: 4 CAPSULE | Refills: 2 | Status: SHIPPED
Start: 2022-06-13 | End: 2022-09-14

## 2022-06-13 RX ORDER — OXYCODONE AND ACETAMINOPHEN 10; 325 MG/1; MG/1
1 TABLET ORAL EVERY 8 HOURS PRN
COMMUNITY
Start: 2022-05-16

## 2022-06-13 NOTE — TELEPHONE ENCOUNTER
Medication Refill Request    LOV 4/22/2022  NOV 7/18/2022    Lab Results   Component Value Date    CREATININE 1.2 (H) 03/24/2022

## 2022-06-22 ENCOUNTER — TELEPHONE (OUTPATIENT)
Dept: CASE MANAGEMENT | Age: 67
End: 2022-06-22

## 2022-06-22 DIAGNOSIS — E03.9 ACQUIRED HYPOTHYROIDISM: ICD-10-CM

## 2022-06-22 DIAGNOSIS — I10 ESSENTIAL HYPERTENSION: ICD-10-CM

## 2022-06-22 RX ORDER — AMLODIPINE BESYLATE 5 MG/1
TABLET ORAL
Qty: 90 TABLET | Refills: 0 | Status: SHIPPED
Start: 2022-06-22 | End: 2022-09-14

## 2022-06-22 RX ORDER — LEVOTHYROXINE SODIUM 88 UG/1
TABLET ORAL
Qty: 90 TABLET | Refills: 0 | Status: SHIPPED
Start: 2022-06-22 | End: 2022-07-19 | Stop reason: DRUGHIGH

## 2022-06-22 NOTE — TELEPHONE ENCOUNTER
No call, encounter opened to process CT Lung Screening. CT Lung Screen: 5/18/2022    Impression   Stable bilateral 2-3 mm pulmonary nodules.       Additional, incidental findings as above, including coronary artery   calcifications, potentially a marker for coronary artery disease.       LUNG RADS:   Per ACR Lung-RADS Version 1.1       Category 2, Benign appearance or behavior.       Management:  Continue annual lung screening with LDCT in 12 months.       RECOMMENDATIONS:   If you would like to register your patient with the [facility], please contact the Nurse Navigator at   [phone]. Pack years: 39    Social History     Tobacco Use  Smoking Status: Former Smoker    Start Date: 1972   Quit Date: 2020   Types: Cigarettes   Packs/Day: 1   Years: 39   Pack Years: 39   Smokeless Tobacco: Never used         Results letter sent to patient via my chart or mailed.      [address]

## 2022-07-18 ENCOUNTER — OFFICE VISIT (OUTPATIENT)
Dept: FAMILY MEDICINE CLINIC | Age: 67
End: 2022-07-18
Payer: MEDICARE

## 2022-07-18 ENCOUNTER — HOSPITAL ENCOUNTER (OUTPATIENT)
Age: 67
Discharge: HOME | End: 2022-07-18
Payer: MEDICARE

## 2022-07-18 VITALS
BODY MASS INDEX: 32.06 KG/M2 | HEIGHT: 61 IN | HEART RATE: 72 BPM | OXYGEN SATURATION: 96 % | WEIGHT: 169.8 LBS | RESPIRATION RATE: 18 BRPM | DIASTOLIC BLOOD PRESSURE: 68 MMHG | SYSTOLIC BLOOD PRESSURE: 120 MMHG | TEMPERATURE: 97.9 F

## 2022-07-18 DIAGNOSIS — R06.09 DOE (DYSPNEA ON EXERTION): Primary | ICD-10-CM

## 2022-07-18 DIAGNOSIS — R73.03 PREDIABETES: ICD-10-CM

## 2022-07-18 DIAGNOSIS — E78.2 MIXED HYPERLIPIDEMIA: ICD-10-CM

## 2022-07-18 DIAGNOSIS — E03.9 ACQUIRED HYPOTHYROIDISM: ICD-10-CM

## 2022-07-18 DIAGNOSIS — I10 ESSENTIAL HYPERTENSION: ICD-10-CM

## 2022-07-18 DIAGNOSIS — E55.9 VITAMIN D DEFICIENCY: ICD-10-CM

## 2022-07-18 LAB — TSH SERPL DL<=0.05 MIU/L-ACNC: 0.3 UIU/ML (ref 0.27–4.2)

## 2022-07-18 PROCEDURE — 99214 OFFICE O/P EST MOD 30 MIN: CPT | Performed by: FAMILY MEDICINE

## 2022-07-18 PROCEDURE — 1123F ACP DISCUSS/DSCN MKR DOCD: CPT | Performed by: FAMILY MEDICINE

## 2022-07-18 PROCEDURE — 36415 COLL VENOUS BLD VENIPUNCTURE: CPT

## 2022-07-18 PROCEDURE — 84443 ASSAY THYROID STIM HORMONE: CPT

## 2022-07-18 ASSESSMENT — ENCOUNTER SYMPTOMS
EYE PAIN: 0
CONSTIPATION: 1
NAUSEA: 0
VOMITING: 0
BLOOD IN STOOL: 0
ABDOMINAL PAIN: 0
SHORTNESS OF BREATH: 1
DIARRHEA: 0
COUGH: 0
WHEEZING: 0

## 2022-07-18 NOTE — PROGRESS NOTES
7/18/2022    Idalia Lopez is a 75 y.o. female here for:    Chief Complaint   Patient presents with    Shortness of Breath        HPI:  Shortness of breath   - Ongoing for several years. - Patient reports shortness of breath with physical activities, such as walking, housework, going up stairs, etc.   - Patient denies shortness of breath at rest.   - Patient denies orthopnea, PND, and leg swelling.   - Last echocardiogram in 2013 showed EF >60%, stage I diastolic dysfunction, mild tricuspid regurgitation, and mild pulmonary HTN. - EKG in April 2022 showed NSR, and it was unchanged from EKG in 08/09/2021.   - PMHx of COPD. Follows with Pulmonology, Dr. Rebeca Stahl. On Trelegy Ellipta inhaler 1 puff QD and montelukast 10 mg HS. Reports compliance with medications.   - Recent CT chest on 05/18/2022 shows stable pulmonary nodules. - Former smoker. Current Outpatient Medications   Medication Sig Dispense Refill    amLODIPine (NORVASC) 5 MG tablet TAKE ONE TABLET BY MOUTH DAILY 90 tablet 0    levothyroxine (SYNTHROID) 88 MCG tablet TAKE ONE TABLET BY MOUTH DAILY 90 tablet 0    montelukast (SINGULAIR) 10 MG tablet Take 1 tablet by mouth nightly 30 tablet 2    Cholecalciferol (VITAMIN D3) 1.25 MG (92382 UT) CAPS Take 1 capsule by mouth once a week 4 capsule 2    oxyCODONE-acetaminophen (PERCOCET)  MG per tablet Take 1 tablet by mouth every 8 hours as needed.       citalopram (CELEXA) 20 mg tablet TAKE 1 & 1/2 TABLETS BY MOUTH ONCE A DAY      benazepril-hydrochlorthiazide (LOTENSIN HCT) 20-12.5 MG per tablet Take 1 tablet by mouth daily 30 tablet 2    rOPINIRole (REQUIP) 1 MG tablet Take 1 tablet by mouth nightly 30 tablet 2    alendronate (FOSAMAX) 70 MG tablet Take 1 tablet by mouth every 7 days 12 tablet 1    citalopram (CELEXA) 10 MG tablet Take 30 mg by mouth daily      PROVENTIL  (90 Base) MCG/ACT inhaler Inhale 2 puffs into the lungs every 6 hours as needed for Wheezing (wheezing) Wants the proventil. Will pay out of pocket. 1 each 3    TRELEGY ELLIPTA 100-62.5-25 MCG/INH AEPB Inhale 1 puff into the lungs daily 3 each 3    traZODone (DESYREL) 100 MG tablet Take 100 mg by mouth every evening      Coenzyme Q10 (COQ-10) 10 MG CAPS Take 1 capsule by mouth daily      fluticasone (FLONASE) 50 MCG/ACT nasal spray 2 sprays by Nasal route daily 1 Bottle 3    TL STANLEY RX 2.2-25-1 MG TABS tablet Take 1 tablet by mouth daily  1    lidocaine (LIDODERM) 5 % Place 1 patch onto the skin 2 times daily 12 hours on, 12 hours off. 60 patch 5    omeprazole (PRILOSEC) 40 MG capsule Take 1 capsule by mouth daily. (Patient taking differently: Take 40 mg by mouth daily as needed) 30 capsule 3    LORazepam (ATIVAN) 1 MG tablet Take 1 mg by mouth in the morning and at bedtime.         Current Facility-Administered Medications   Medication Dose Route Frequency Provider Last Rate Last Admin    perflutren lipid microspheres (DEFINITY) injection 1.65 mg  1.5 mL IntraVENous ONCE PRN Wilberto, DO          Allergies   Allergen Reactions    Celebrex [Celecoxib]     Simvastatin Myalgia    Avelox [Moxifloxacin Hcl In Nacl]     Biaxin [Clarithromycin]     Clarithromycin     Desvenlafaxine     Lipitor [Atorvastatin] Myalgia    Lyrica [Pregabalin]     Tetracyclines & Related     Zithromax [Azithromycin]     Sulfa Antibiotics Rash    Vioxx [Rofecoxib] Nausea And Vomiting       Past Medical & Surgical History:      Diagnosis Date    Allergic rhinitis     Chronic pain syndrome     follows with [facility], Dr. Jcarlos Almaguer    COPD (chronic obstructive pulmonary disease) ([facility] [address]     follows with Pulmonology, Dr. Swetha Ritter    GERD (gastroesophageal reflux disease)     Grade I diastolic dysfunction 5780    Hernia, hiatal 12/14/2017    Hyperlipidemia     Hypertension     Hypothyroidism 04/07/2017    Irritable bowel     Ischemic colitis ([facility] [address]     Mild tricuspid regurgitation 2013    Osteopenia of multiple sites 05/02/2022    major palpitations and leg swelling. Gastrointestinal:  Positive for constipation. Negative for abdominal pain, blood in stool, diarrhea, nausea and vomiting. Neurological:  Negative for dizziness, syncope and light-headedness. BP Readings from Last 3 Encounters:   07/18/22 120/68   04/22/22 118/70   03/24/22 (!) 146/66       VS:  /68 (Site: Right Upper Arm, Position: Sitting, Cuff Size: Medium Adult)   Pulse 72   Temp 97.9 °F (36.6 °C) (Temporal)   Resp 18   Ht 5' 1\" (1.549 m)   Wt 169 lb 12.8 oz (77 kg)   SpO2 96%   BMI 32.08 kg/m²     Physical Exam  Vitals reviewed. Constitutional:       General: She is awake. She is not in acute distress. Appearance: She is well-developed and well-groomed. She is obese. She is not ill-appearing, toxic-appearing or diaphoretic. Neck:      Vascular: No carotid bruit. Cardiovascular:      Rate and Rhythm: Normal rate and regular rhythm. Heart sounds: S1 normal and S2 normal. No murmur heard. Pulmonary:      Breath sounds: No decreased breath sounds, wheezing, rhonchi or rales. Abdominal:      General: Bowel sounds are normal. There is no distension. Palpations: Abdomen is soft. Tenderness: There is no abdominal tenderness. There is no guarding or rebound. Musculoskeletal:      Cervical back: Neck supple. Right lower leg: No tenderness. No edema. Left lower leg: No tenderness. No edema. Skin:     General: Skin is warm and dry. Neurological:      General: No focal deficit present. Mental Status: She is alert. Psychiatric:         Attention and Perception: Attention normal.         Mood and Affect: Mood normal.         Speech: Speech normal.         Behavior: Behavior normal. Behavior is cooperative. Thought Content: Thought content normal.         Cognition and Memory: Cognition normal.         Judgment: Judgment normal.       Assessment/Plan:  1. GALLEGOS (dyspnea on exertion)  Uncontrolled. Likely due to her COPD. However, given findings of mild pulmonary HTN, mild TR, and stage I diastolic dysfunction on prior echocardiogram from 2013, will repeat echocardiogram today. EKG in April 2022 unchanged from EKG in August 2021. No symptoms of angina, so will hold off on cardiac stress test at this time. - perflutren lipid microspheres (DEFINITY) injection 1.65 mg  - ECHO COMPLETE; Future    2. Essential hypertension  - CBC with Auto Differential; Future  - Comprehensive Metabolic Panel; Future    3. Acquired hypothyroidism  - TSH; Future  - TSH; Future    4. Mixed hyperlipidemia  - LIPID PANEL; Future    5. Prediabetes  - Hemoglobin A1C; Future    6. Vitamin D deficiency  - Vitamin D 25 Hydroxy; Future      Return in about 6 months (around 1/18/2023) for 6 month med check.       Paul Medina,   Family Medicine

## 2022-07-19 DIAGNOSIS — E03.9 ACQUIRED HYPOTHYROIDISM: Primary | ICD-10-CM

## 2022-07-19 RX ORDER — LEVOTHYROXINE SODIUM 0.07 MG/1
75 TABLET ORAL DAILY
Qty: 90 TABLET | Refills: 0 | Status: SHIPPED
Start: 2022-07-19 | End: 2022-10-31

## 2022-08-07 DIAGNOSIS — G25.81 RESTLESS LEG SYNDROME: ICD-10-CM

## 2022-08-08 ENCOUNTER — OFFICE VISIT (OUTPATIENT)
Dept: PULMONOLOGY | Age: 67
End: 2022-08-08
Payer: MEDICARE

## 2022-08-08 VITALS
OXYGEN SATURATION: 94 % | RESPIRATION RATE: 18 BRPM | DIASTOLIC BLOOD PRESSURE: 53 MMHG | HEIGHT: 61 IN | SYSTOLIC BLOOD PRESSURE: 105 MMHG | WEIGHT: 173 LBS | BODY MASS INDEX: 32.66 KG/M2 | HEART RATE: 67 BPM | TEMPERATURE: 97.5 F

## 2022-08-08 DIAGNOSIS — J43.1 PANLOBULAR EMPHYSEMA (HCC): Primary | ICD-10-CM

## 2022-08-08 DIAGNOSIS — J41.0 SIMPLE CHRONIC BRONCHITIS (HCC): ICD-10-CM

## 2022-08-08 LAB
EXPIRATORY TIME-POST: NORMAL
EXPIRATORY TIME: NORMAL
FEF 25-75% %CHNG: NORMAL
FEF 25-75% %PRED-POST: NORMAL
FEF 25-75% %PRED-PRE: NORMAL
FEF 25-75% PRED: NORMAL
FEF 25-75%-POST: NORMAL
FEF 25-75%-PRE: NORMAL
FEV1 %PRED-POST: 53 %
FEV1 %PRED-PRE: 50 %
FEV1 PRED: 2.04 L
FEV1-POST: 109 L
FEV1-PRE: 1.03 L
FEV1/FVC %PRED-POST: 79 %
FEV1/FVC %PRED-PRE: 68 %
FEV1/FVC PRED: 79 %
FEV1/FVC-POST: 63 %
FEV1/FVC-PRE: 54 %
FVC %PRED-POST: 66 L
FVC %PRED-PRE: 72 %
FVC PRED: 2.6 L
FVC-POST: 1.73 L
FVC-PRE: 1.89 L
PEF %PRED-POST: NORMAL
PEF %PRED-PRE: NORMAL
PEF PRED: NORMAL
PEF%CHNG: NORMAL
PEF-POST: NORMAL
PEF-PRE: NORMAL

## 2022-08-08 PROCEDURE — 1123F ACP DISCUSS/DSCN MKR DOCD: CPT | Performed by: INTERNAL MEDICINE

## 2022-08-08 PROCEDURE — 94060 EVALUATION OF WHEEZING: CPT | Performed by: INTERNAL MEDICINE

## 2022-08-08 PROCEDURE — 99213 OFFICE O/P EST LOW 20 MIN: CPT | Performed by: INTERNAL MEDICINE

## 2022-08-08 RX ORDER — ROPINIROLE 1 MG/1
TABLET, FILM COATED ORAL
Qty: 90 TABLET | Refills: 1 | Status: SHIPPED | OUTPATIENT
Start: 2022-08-08 | End: 2022-11-06

## 2022-08-08 RX ORDER — ALBUTEROL SULFATE 90 MCG
2 HFA AEROSOL WITH ADAPTER (GRAM) INHALATION EVERY 6 HOURS PRN
Qty: 1 EACH | Refills: 3 | Status: SHIPPED | OUTPATIENT
Start: 2022-08-08

## 2022-08-08 ASSESSMENT — PULMONARY FUNCTION TESTS
FEV1_PERCENT_PREDICTED_PRE: 50
FEV1/FVC_PERCENT_PREDICTED_POST: 79
FEV1/FVC_PRE: 54
FVC_POST: 1.73
FEV1_PRE: 1.03
FEV1/FVC_POST: 63
FVC_PERCENT_PREDICTED_POST: 66
FEV1/FVC_PREDICTED: 79
FEV1_PERCENT_PREDICTED_POST: 53
FEV1_PREDICTED: 2.04
FVC_PERCENT_PREDICTED_PRE: 72
FVC_PREDICTED: 2.60
FVC_PRE: 1.89
FEV1_POST: 109
FEV1/FVC_PERCENT_PREDICTED_PRE: 68

## 2022-08-08 NOTE — PROGRESS NOTES
[facility]     HISTORY OF PRESENT ILLNESS:    Lizy Jaquez is a 75y.o. year old female here for evaluation of eczema. The patient reports that despite her inhalers she is having more frequent issues with her breathing. She is currently prescribed Spiriva, Flovent, and Proventil. She reports that she is sometimes using the Flovent 3-4 times a day. Her breathing is worse with humidity and cold air. Symptoms include wheezing. She also reports having some sinus drainage and a daily cough. She quit smoking 2 or 3 years ago she reports that previously she smoked half to 1 pack/day for 40-adair years. She denies any thoracic surgeries. She denies any alcohol. No illicits. Her inhalational exposures reports that she is around hay, raccoons, a wood burner, diesel smoke, and she has 2 horses. She has no significant travel history. She reports that her father  of lung cancer and that her brother has COPD and \"spots on his lungs. Updated HPI as of 2022:  Patient reports that her breathing feels a little bit better. She is using her albuterol inhaler sometimes 2-3 times a day on bad days sometimes not having to use it at all she does state that she continues to use the Trelegy inhaler. She states that when she notices that she is most short of breath is when she is going outside and working with her horses. And walking long distances to and from her bottom. Sometimes she does feel occasional heart racing with the shortness of breath however this does quickly subside with rest.  She denies any symptoms of chest pain chest pressure.     ALLERGIES:    Allergies   Allergen Reactions    Celebrex [Celecoxib]     Simvastatin Myalgia    Avelox [Moxifloxacin Hcl In Nacl]     Biaxin [Clarithromycin]     Clarithromycin     Desvenlafaxine     Lipitor [Atorvastatin] Myalgia    Lyrica [Pregabalin]     Tetracyclines & Related     Zithromax [Azithromycin]     Sulfa Antibiotics Rash    Vioxx [Rofecoxib] Nausea And Vomiting       PAST MEDICAL HISTORY:       Diagnosis Date    Allergic rhinitis     Chronic pain syndrome     follows with [facility], Dr. Niurka Hein    COPD (chronic obstructive pulmonary disease) ([facility]     follows with Pulmonology, Dr. Noni Castro    GERD (gastroesophageal reflux disease)     Grade I diastolic dysfunction 8582    Hernia, hiatal 12/14/2017    Hyperlipidemia     Hypertension     Hypothyroidism 04/07/2017    Irritable bowel     Ischemic colitis ([facility]     Mild tricuspid regurgitation 2013    Osteopenia of multiple sites 05/02/2022    major osteoporotic fracture risk is 20.7%, risk of hip fracture is 0.4%    Overactive bladder     follows with Urology, Dr. Jessenia Henning    Pneumonia     Prediabetes 03/24/2022    Pulmonary HTN ([facility] 2013    mild    Recurrent major depressive disorder ([facility] 12/14/2017    follows with Psychiatry, Dr. Flavia Stephens    Restless legs syndrome     Stage 3a chronic kidney disease ([facility]     Vitamin D deficiency        MEDICATIONS:   Current Outpatient Medications   Medication Sig Dispense Refill    levothyroxine (SYNTHROID) 75 MCG tablet Take 1 tablet by mouth in the morning. 90 tablet 0    amLODIPine (NORVASC) 5 MG tablet TAKE ONE TABLET BY MOUTH DAILY 90 tablet 0    montelukast (SINGULAIR) 10 MG tablet Take 1 tablet by mouth nightly 30 tablet 2    Cholecalciferol (VITAMIN D3) 1.25 MG (21226 UT) CAPS Take 1 capsule by mouth once a week 4 capsule 2    oxyCODONE-acetaminophen (PERCOCET)  MG per tablet Take 1 tablet by mouth every 8 hours as needed.       citalopram (CELEXA) 20 mg tablet TAKE 1 & 1/2 TABLETS BY MOUTH ONCE A DAY      benazepril-hydrochlorthiazide (LOTENSIN HCT) 20-12.5 MG per tablet Take 1 tablet by mouth daily 30 tablet 2    rOPINIRole (REQUIP) 1 MG tablet Take 1 tablet by mouth nightly 30 tablet 2    alendronate (FOSAMAX) 70 MG tablet Take 1 tablet by mouth every 7 days 12 tablet 1 citalopram (CELEXA) 10 MG tablet Take 30 mg by mouth daily      PROVENTIL  (90 Base) MCG/ACT inhaler Inhale 2 puffs into the lungs every 6 hours as needed for Wheezing (wheezing) Wants the proventil. Will pay out of pocket. 1 each 3    TRELEGY ELLIPTA 100-62.5-25 MCG/INH AEPB Inhale 1 puff into the lungs daily 3 each 3    traZODone (DESYREL) 100 MG tablet Take 100 mg by mouth every evening      Coenzyme Q10 (COQ-10) 10 MG CAPS Take 1 capsule by mouth daily      fluticasone (FLONASE) 50 MCG/ACT nasal spray 2 sprays by Nasal route daily 1 Bottle 3    TL STANLEY RX 2.2-25-1 MG TABS tablet Take 1 tablet by mouth daily  1    lidocaine (LIDODERM) 5 % Place 1 patch onto the skin 2 times daily 12 hours on, 12 hours off. 60 patch 5    omeprazole (PRILOSEC) 40 MG capsule Take 1 capsule by mouth daily. (Patient taking differently: Take 40 mg by mouth daily as needed) 30 capsule 3    LORazepam (ATIVAN) 1 MG tablet Take 1 mg by mouth in the morning and at bedtime. Current Facility-Administered Medications   Medication Dose Route Frequency Provider Last Rate Last Admin    perflutren lipid microspheres (DEFINITY) injection 1.65 mg  1.5 mL IntraVENous ONCE PRN DO Kaleb           SOCIAL AND OCCUPATIONAL HEALTH: Patient is a former smoker. She previously smoked 1/2 to 1 pack/day for approximately 40 years. She quit 2 to 3 years ago. She has no significant travel history. She does have 2 horses and is around hay, raccoons, a wood burner, and diesel smoke. Otherwise no known inhalational exposures.   SURGICAL HISTORY:   Past Surgical History:   Procedure Laterality Date    APPENDECTOMY N/A     CARDIOVASCULAR STRESS TEST N/A 02/2006    Dr. Garth Sharma; EF=70%    CHOLECYSTECTOMY N/A     COLONOSCOPY N/A 02/20/2013    Dr. Uzma Kenyon; ischemic colitis involving the descending and sigmoid colon, diverticulosis of the sigmoid colon    COLONOSCOPY N/A 05/12/2008    Dr. Kenna Cardenas; internal hemorrhoids and spastic colon    ECHO COMPLETE N/A 02/19/2013    Dr. Viky Santacruz; EF >98%-ZIAEI I diastolic dysfunction, with mild TR and mild pulm HTN    TONSILLECTOMY Bilateral     UPPER GASTROINTESTINAL ENDOSCOPY N/A 02/19/2013    Dr. Sasha Munson; gastritis    UPPER GASTROINTESTINAL ENDOSCOPY N/A 11/15/2011       FAMILY HISTORY: No family history of cancer, blood clots     REVIEW OF SYSTEMS:  Constitutional: No fevers, chills, unintentional weight loss  Skin: No rashes or lesions  EENT: No change in vision, change in hearing, change in taste, change in smell  Cardiovascular: Denies chest pain, chest pressure, palpitations. Positive for occasional heart racing with shortness of breath  Respiration: Positive for shortness of breath with exertion, walking long distances. Gastrointestinal: Denies nausea, vomiting, diarrhea  Musculoskeletal: Denies joint or muscle pain  Neurological: Denies syncope, headache, seizures  Psychological: Denies anxiety or depression  Endocrine: Denies polyuria polydipsia  Hematopoietic/lymphatic: Denies easy bruising        PHYSICAL EXAMINATION:  Constitutional: Well-nourished, well-developed. No acute distress  EENT: PERRL, EOMI, no oropharyngeal erythema. No palpable adenopathy  Neck: Trachea and thyroid midline  Respiratory: Breath sounds are currently clear to auscultation bilaterally. There is no use of accessory muscles. Cardiovascular: Regular rate and rhythm, no murmurs rubs or gallops  Pulses: Equal bilaterally  Abdomen: Soft nontender bowel sounds present  Lymphatic: No palpable adenopathy  Musculoskeletal: Gait steady  Extremities: No clubbing, cyanosis, edema. Skin: No rashes or lesions  Neurological/Psychiatric: Neurologically intact, no focal deficits. Affect appropriate    DATA: Spirometry previously which demonstrated FVC of 1.66 L which is 65% of predicted. FEV1 of 0.83 L which is 41% of predicted. FEV1 FVC ratio is 50. There is a 24% bronchodilator response. MVV is 29 which is 36% of predicted.   SVC is 1.50 which is 59% of predicted. Flow volume loop is consistent with combined obstruction and restriction. Overall pulmonary function testing is consistent with severe COPD. MVV is significantly decreased which may be due to deconditioning versus neuromuscular weakness. Spirometry completed on 8/8/2022 shows:  FVC of 1.89 L which is 72% of predicted. FEV1 1.03 L which is 50% of predicted. FEV1 FVC ratio is 54. MVV was 43 which is 53% of predicted. SVC is 2.30 L which is 88% of predicted. Overall consistent with severe COPD however these are slightly improved from her PFTs that she had in February 2022    Low-dose screening CT of the chest was completed in May which showed stable bilateral 2 to 3 mm pulmonary nodules. IMPRESSION:       1. Severe COPD  2. Former tobacco use  3. Need for low-dose screening CT  4. Pulmonary hypertension? PLAN:      1. Continue Trelegy  2. Low-dose screening CT of the chest is due in May 2023. Will order at the next visit. 3.  Recommend influenza vaccine when available      I hope this updates you on my evaluation and clinical thinking. Thank you for allowing me to participate in his care. Sincerely,        Mil Lopez.  Office: [phone]  Fax: [phone]      Low Dose CT (LDCT) Lung Screening criteria met:     Age 55-77(Medicare) or 50-80 (USPST)   Pack year smoking >30 (Medicare) or >20 (USPSTF)   Still smoking or less than 15 year since quit   No sign or symptoms of lung cancer   > 11 months since last LDCT     Risks and benefits of lung cancer screening with LDCT scans discussed:    Significance of positive screen - False-positive LDCT results often occur. 95% of all positive results do not lead to a diagnosis of cancer. Usually further imaging can resolve most false-positive results; however, some patients may require invasive procedures.     Over diagnosis risk - 10% to 12% of screen-detected lung cancer cases are over diagnosed--that is, the cancer would not have been detected in the patient's lifetime without the screening. Need for follow up screens annually to continue lung cancer screening effectiveness     Risks associated with radiation from annual LDCT- Radiation exposure is about the same as for a mammogram, which is about 1/3 of the annual background radiation exposure from everyday life. Starting screening at age 54 is not likely to increase cancer risk from radiation exposure. Patients with comorbidities resulting in life expectancy of < 10 years, or that would preclude treatment of an abnormality identified on CT, should not be screened due to lack of benefit.     To obtain maximal benefit from this screening, smoking cessation and long-term abstinence from smoking is critical

## 2022-08-08 NOTE — PROGRESS NOTES
Patient will follow up with physician in 6 months. Patient was given samples of trelegy during office visit under the direction of Dr. Noreen Roper. Patient will have a CT Chest scheduled in one year. Patient to call the office after her 2 DEcho is done and if results are good then patient can start Pulmonary Rehab.

## 2022-08-08 NOTE — TELEPHONE ENCOUNTER
Medication Refill Request    LOV 7/18/2022  NOV 1/20/2023    Lab Results   Component Value Date    CREATININE 1.2 (H) 03/24/2022

## 2022-08-08 NOTE — PATIENT INSTRUCTIONS
[address] [facility]  [address] [facility], [address]  Office: [phone]      Your were seen in the office today for COPD      We  did not make changes to your medications today. Testing ordered today was CT scan of the chest      Vaccines recommended Influenza              Please do not hesitate to call the office with any questions.

## 2022-08-25 DIAGNOSIS — I10 ESSENTIAL HYPERTENSION: ICD-10-CM

## 2022-08-25 RX ORDER — BENAZEPRIL HYDROCHLORIDE AND HYDROCHLOROTHIAZIDE 20; 12.5 MG/1; MG/1
TABLET ORAL
Qty: 90 TABLET | Refills: 1 | Status: SHIPPED | OUTPATIENT
Start: 2022-08-25

## 2022-09-13 DIAGNOSIS — I10 ESSENTIAL HYPERTENSION: ICD-10-CM

## 2022-09-13 DIAGNOSIS — J43.1 PANLOBULAR EMPHYSEMA (HCC): ICD-10-CM

## 2022-09-13 DIAGNOSIS — E55.9 VITAMIN D DEFICIENCY: ICD-10-CM

## 2022-09-14 RX ORDER — AMLODIPINE BESYLATE 5 MG/1
5 TABLET ORAL DAILY
Qty: 90 TABLET | Refills: 1 | Status: SHIPPED
Start: 2022-09-14 | End: 2022-11-18 | Stop reason: ALTCHOICE

## 2022-09-14 RX ORDER — TIZANIDINE 2 MG/1
TABLET ORAL
COMMUNITY
Start: 2022-09-09

## 2022-09-14 RX ORDER — MONTELUKAST SODIUM 10 MG/1
10 TABLET ORAL NIGHTLY
Qty: 90 TABLET | Refills: 1 | Status: SHIPPED | OUTPATIENT
Start: 2022-09-14

## 2022-09-14 RX ORDER — CHOLECALCIFEROL (VITAMIN D3) 1250 MCG
1 CAPSULE ORAL WEEKLY
Qty: 12 CAPSULE | Refills: 1 | Status: SHIPPED | OUTPATIENT
Start: 2022-09-14

## 2022-09-26 ENCOUNTER — HOSPITAL ENCOUNTER (OUTPATIENT)
Dept: NON INVASIVE DIAGNOSTICS | Age: 67
Discharge: HOME | End: 2022-09-26
Payer: MEDICARE

## 2022-09-26 DIAGNOSIS — R06.09 DOE (DYSPNEA ON EXERTION): ICD-10-CM

## 2022-09-26 LAB
LV EF: 60 %
LVEF MODALITY: NORMAL

## 2022-09-26 PROCEDURE — 93306 TTE W/DOPPLER COMPLETE: CPT

## 2022-09-28 DIAGNOSIS — R06.09 DOE (DYSPNEA ON EXERTION): Primary | ICD-10-CM

## 2022-09-28 DIAGNOSIS — R00.2 PALPITATIONS: ICD-10-CM

## 2022-09-29 ENCOUNTER — TELEPHONE (OUTPATIENT)
Dept: CARDIOLOGY CLINIC | Age: 67
End: 2022-09-29

## 2022-09-29 NOTE — TELEPHONE ENCOUNTER
Patient Appointment Form:      PCP: Dr. Jean Tipton  Referring: Dr. Jean Tipton    Has the Patient:    Seen a Cardiologist? yes    date:02/19/201  Physician:  location:IP at [facility] ([facility])    Had a heart catheterization? no    Had heart surgery? no    Had a stress test or nuclear stress test? yes   date: Over 10yrs   facility name:  [facility] ([facility])    Had an echocardiogram? yes   date: 09/26/2022   facility name:  [facility]    Had a vascular ultrasound? no    Had a 24/48 heart monitor or extended cardiac event monitor? no    Had recent blood work in the last 6 months? no    Had a pacemaker/ICD/ILR implant? no    Seen an Electrophysiologist? no        Will send records via: in [address] [place] [address] Rd      Date & time of appointment:  10/27/2022 220P Dr.Reddy vitale by PCP

## 2022-09-30 ENCOUNTER — TELEPHONE (OUTPATIENT)
Dept: PULMONOLOGY | Age: 67
End: 2022-09-30

## 2022-09-30 NOTE — TELEPHONE ENCOUNTER
Mailed a letter to patient informingher that her CT Chest is scheduled for 11-28-22 at 11:00 am at [place] [address].  She must arrive by 10:30 am. There is no prep for this test

## 2022-10-27 ENCOUNTER — OFFICE VISIT (OUTPATIENT)
Dept: CARDIOLOGY CLINIC | Age: 67
End: 2022-10-27
Payer: MEDICARE

## 2022-10-27 ENCOUNTER — TELEPHONE (OUTPATIENT)
Dept: CARDIOLOGY CLINIC | Age: 67
End: 2022-10-27

## 2022-10-27 VITALS
BODY MASS INDEX: 35.02 KG/M2 | WEIGHT: 178.4 LBS | HEIGHT: 60 IN | SYSTOLIC BLOOD PRESSURE: 122 MMHG | HEART RATE: 64 BPM | RESPIRATION RATE: 18 BRPM | DIASTOLIC BLOOD PRESSURE: 60 MMHG

## 2022-10-27 DIAGNOSIS — R00.2 PALPITATIONS: Primary | ICD-10-CM

## 2022-10-27 DIAGNOSIS — I10 ESSENTIAL HYPERTENSION: ICD-10-CM

## 2022-10-27 DIAGNOSIS — R06.09 DOE (DYSPNEA ON EXERTION): Primary | ICD-10-CM

## 2022-10-27 DIAGNOSIS — R06.09 DOE (DYSPNEA ON EXERTION): ICD-10-CM

## 2022-10-27 PROCEDURE — G8427 DOCREV CUR MEDS BY ELIG CLIN: HCPCS | Performed by: INTERNAL MEDICINE

## 2022-10-27 PROCEDURE — 99204 OFFICE O/P NEW MOD 45 MIN: CPT | Performed by: INTERNAL MEDICINE

## 2022-10-27 PROCEDURE — 3078F DIAST BP <80 MM HG: CPT | Performed by: INTERNAL MEDICINE

## 2022-10-27 PROCEDURE — G8484 FLU IMMUNIZE NO ADMIN: HCPCS | Performed by: INTERNAL MEDICINE

## 2022-10-27 PROCEDURE — 3017F COLORECTAL CA SCREEN DOC REV: CPT | Performed by: INTERNAL MEDICINE

## 2022-10-27 PROCEDURE — 93000 ELECTROCARDIOGRAM COMPLETE: CPT | Performed by: INTERNAL MEDICINE

## 2022-10-27 PROCEDURE — 3074F SYST BP LT 130 MM HG: CPT | Performed by: INTERNAL MEDICINE

## 2022-10-27 PROCEDURE — 1123F ACP DISCUSS/DSCN MKR DOCD: CPT | Performed by: INTERNAL MEDICINE

## 2022-10-27 PROCEDURE — 1090F PRES/ABSN URINE INCON ASSESS: CPT | Performed by: INTERNAL MEDICINE

## 2022-10-27 PROCEDURE — G8417 CALC BMI ABV UP PARAM F/U: HCPCS | Performed by: INTERNAL MEDICINE

## 2022-10-27 PROCEDURE — G8399 PT W/DXA RESULTS DOCUMENT: HCPCS | Performed by: INTERNAL MEDICINE

## 2022-10-27 PROCEDURE — 1036F TOBACCO NON-USER: CPT | Performed by: INTERNAL MEDICINE

## 2022-10-27 NOTE — PROGRESS NOTES
OUTPATIENT CARDIOLOGY CONSULT    Name: Alba Williamson    Age: 75 y.o. Date of Service: 10/27/22    Reason for Consultation:  Chief Complaint   Patient presents with    New Patient    Palpitations    Shortness of Breath       Referring Physician: Darryl Wilder DO    History of Present Illness:  Alba Williamson is a 75 y.o. female who presents today for further evaluation of palpitations and shortness of breath  PMH includes former smoker with 75-pack-year history of smoking and quit in 2019, COPD, hypertension, hyperlipidemia, hyperglycemia, hypothyroidism on HRT, GERD, chronic pain syndrome with chronic neck lower back pain secondary to industrial accident, depression, GERD, patient was unable to tolerate statins due to muscle pains. She presents with complaints of dyspnea with exertion and gets winded if she walks 200 yards with associated faster heart rate/palpitations, she has to sit down and rest and it takes about several minutes to feel better. She also gets palpitations at the same time when she gets winded. Denies any chest pain or fatigue, syncope or presyncope. She quit smoking 15 year ago. Denies any illicit drugs or alcohol use. No family history of premature CAD. She has no complaints of chest pain, lightheadedness, dizziness, or syncope. No history of PND or orthopnea. She is currently with no active cardiac complaints at rest. SR on EKG.     Review of Systems:  Cardiac: As per HPI  General: No fever, chills  Pulmonary: As per HPI  HEENT: No visual disturbances, difficult swallowing  GI: No nausea, vomiting  Endocrine: No thyroid disease or DM  Musculoskeletal: STREET x 4, no focal motor deficits  Skin: Intact, no rashes  Neuro/Psych: No headache or seizures    Past Medical History:  Past Medical History:   Diagnosis Date    Allergic rhinitis     Chronic pain syndrome     follows with [facility], Dr. April Ortiz    COPD (chronic obstructive pulmonary disease) ([facility] follows with Pulmonology, Dr. Pooja Noguera    GERD (gastroesophageal reflux disease)     Grade I diastolic dysfunction 6645    Hernia, hiatal 12/14/2017    Hyperlipidemia     Hypertension     Hypothyroidism 04/07/2017    Irritable bowel     Ischemic colitis ([facility]     Mild tricuspid regurgitation 2013    Osteopenia of multiple sites 05/02/2022    major osteoporotic fracture risk is 20.7%, risk of hip fracture is 0.4%    Overactive bladder     follows with Urology, Dr. Ryland Mancia    Pneumonia     Prediabetes 03/24/2022    Recurrent major depressive disorder ([facility] 12/14/2017    follows with Psychiatry, Dr. Troy Yousif    Restless legs syndrome     Stage 3a chronic kidney disease ([facility]     Vitamin D deficiency        Past Surgical History:  Past Surgical History:   Procedure Laterality Date    APPENDECTOMY N/A     CARDIOVASCULAR STRESS TEST N/A 02/2006    Dr. Zach Bowen; EF=70%    CHOLECYSTECTOMY N/A     COLONOSCOPY N/A 02/20/2013    Dr. Yuri Garcia; ischemic colitis involving the descending and sigmoid colon, diverticulosis of the sigmoid colon    COLONOSCOPY N/A 05/12/2008    Dr. Shell Chin; internal hemorrhoids and spastic colon    ECHO COMPLETE N/A 02/19/2013    Dr. Wagner Becker; EF >53%-WUGWL I diastolic dysfunction, with mild TR and mild pulm HTN    TONSILLECTOMY Bilateral     UPPER GASTROINTESTINAL ENDOSCOPY N/A 02/19/2013    Dr. Yuri Garcia; gastritis    UPPER GASTROINTESTINAL ENDOSCOPY N/A 11/15/2011       Family History:  Family History   Problem Relation Age of Onset    Thyroid Disease Mother     Hypertension Mother     Lung Cancer Father         with metastasis to his brain    Pacemaker Brother     COPD Brother     Obesity Maternal Grandmother     Heart Disease Paternal Grandmother     Breast Cancer Paternal Aunt     Breast Cancer Paternal Aunt        Social History:  Social History     Socioeconomic History    Marital status:      Spouse name: Not on file    Number of children: Not on file    Years of education: Not on file    Highest education level: Not on file   Occupational History    Not on file   Tobacco Use    Smoking status: Former     Packs/day: 1.00     Years: 45.00     Pack years: 45.00     Types: Cigarettes     Start date: 0     Quit date:      Years since quittin.8    Smokeless tobacco: Never   Vaping Use    Vaping Use: Former    Substances: Nicotine   Substance and Sexual Activity    Alcohol use: No    Drug use: Never    Sexual activity: Not Currently   Other Topics Concern    Not on file   Social History Narrative    Was disabled at work due to a fall. Worked at [employer] in [place], [place]. Currently on total disability. Social Determinants of Health     Financial Resource Strain: Low Risk     Difficulty of Paying Living Expenses: Not very hard   Food Insecurity: No Food Insecurity    Worried About Running Out of Food in the Last Year: Never true    Ran Out of Food in the Last Year: Never true   Transportation Needs: Not on file   Physical Activity: Not on file   Stress: Not on file   Social Connections: Not on file   Intimate Partner Violence: Not on file   Housing Stability: Not on file       Allergies:   Allergies   Allergen Reactions    Celebrex [Celecoxib]     Simvastatin Myalgia    Avelox [Moxifloxacin Hcl In Nacl]     Biaxin [Clarithromycin]     Clarithromycin     Desvenlafaxine     Lipitor [Atorvastatin] Myalgia    Lyrica [Pregabalin]     Tetracyclines & Related     Zithromax [Azithromycin]     Sulfa Antibiotics Rash    Vioxx [Rofecoxib] Nausea And Vomiting       Current Medications:  Current Outpatient Medications   Medication Sig Dispense Refill    Cholecalciferol (VITAMIN D3) 1.25 MG (62490 UT) CAPS Take 1 capsule by mouth once a week 12 capsule 1    amLODIPine (NORVASC) 5 MG tablet Take 1 tablet by mouth daily 90 tablet 1    montelukast (SINGULAIR) 10 MG tablet Take 1 tablet by mouth nightly 90 tablet 1    tiZANidine (ZANAFLEX) 2 MG tablet TAKE ONE TABLET BY MOUTH EVERY 12 HOURS AS NEEDED FOR MUSCLE SPASM OF THE NECK FOR 30 DAYS      benazepril-hydrochlorthiazide (LOTENSIN HCT) 20-12.5 MG per tablet TAKE ONE TABLET BY MOUTH DAILY 90 tablet 1    rOPINIRole (REQUIP) 1 MG tablet TAKE ONE TABLET BY MOUTH NIGHTLY 90 tablet 1    PROVENTIL  (90 Base) MCG/ACT inhaler Inhale 2 puffs into the lungs every 6 hours as needed for Wheezing (wheezing) Wants the proventil. Will pay out of pocket. 1 each 3    fluticasone-umeclidin-vilant (TRELEGY ELLIPTA) 100-62.5-25 MCG/INH AEPB Inhale 1 puff into the lungs in the morning. 1 each 3    levothyroxine (SYNTHROID) 75 MCG tablet Take 1 tablet by mouth in the morning. 90 tablet 0    oxyCODONE-acetaminophen (PERCOCET)  MG per tablet Take 1 tablet by mouth every 8 hours as needed. Citalopram Hydrobromide 30 MG CAPS daily      alendronate (FOSAMAX) 70 MG tablet Take 1 tablet by mouth every 7 days 12 tablet 1    Citalopram Hydrobromide 30 MG CAPS Take 30 mg by mouth daily      TRELEGY ELLIPTA 100-62.5-25 MCG/INH AEPB Inhale 1 puff into the lungs daily 3 each 3    traZODone (DESYREL) 100 MG tablet Take 100 mg by mouth every evening      Coenzyme Q10 (COQ-10) 10 MG CAPS Take 1 capsule by mouth daily      fluticasone (FLONASE) 50 MCG/ACT nasal spray 2 sprays by Nasal route daily 1 Bottle 3    TL STANLEY RX 2.2-25-1 MG TABS tablet Take 1 tablet by mouth daily  1    lidocaine (LIDODERM) 5 % Place 1 patch onto the skin 2 times daily 12 hours on, 12 hours off. 60 patch 5    omeprazole (PRILOSEC) 40 MG capsule Take 1 capsule by mouth daily. (Patient taking differently: Take 40 mg by mouth daily as needed) 30 capsule 3    LORazepam (ATIVAN) 1 MG tablet Take 1 mg by mouth in the morning and at bedtime.         Current Facility-Administered Medications   Medication Dose Route Frequency Provider Last Rate Last Admin    perflutren lipid microspheres (DEFINITY) injection 1.65 mg  1.5 mL IntraVENous ONCE PRN Tory May,            Physical Exam:  /60   Pulse 64   Resp 18   Ht 5' (1.524 m)   Wt 178 lb 6.4 oz (80.9 kg)   BMI 34.84 kg/m²   Wt Readings from Last 3 Encounters:   10/27/22 178 lb 6.4 oz (80.9 kg)   08/08/22 173 lb (78.5 kg)   07/18/22 169 lb 12.8 oz (77 kg)     Appearance: Awake, alert, no acute respiratory distress  Skin: Intact, no rash  Head: Normocephalic, atraumatic  Eyes: EOMI, no conjunctival erythema  ENMT: No pharyngeal erythema, MMM, no rhinorrhea  Neck: Supple, no elevated JVP, no carotid bruits  Lungs: Clear to auscultation bilaterally. No wheezes, rales, or rhonchi.   Cardiac: Regular rate and rhythm, +S1S2, no murmurs apparent  Abdomen: Soft, nontender, +bowel sounds  Extremities: Moves all extremities x 4, no lower extremity edema  Neurologic: No focal motor deficits apparent, normal mood and affect  Peripheral Pulses: Intact posterior tibial pulses bilaterally    Laboratory Tests:  Lab Results   Component Value Date    CREATININE 1.2 (H) 03/24/2022    BUN 10 03/24/2022     03/24/2022    K 3.6 03/24/2022    CL 99 03/24/2022    CO2 23 03/24/2022     Lab Results   Component Value Date/Time    MG 1.9 02/20/2013 05:30 AM     Lab Results   Component Value Date    WBC 6.9 03/24/2022    HGB 14.4 03/24/2022    HCT 44.9 03/24/2022    MCV 86.2 03/24/2022     03/24/2022     Lab Results   Component Value Date    ALT 23 03/24/2022    AST 31 03/24/2022    ALKPHOS 84 03/24/2022    BILITOT 0.5 03/24/2022     Lab Results   Component Value Date    CKTOTAL 101 02/19/2013    CKMB 1.8 02/19/2013    TROPONINI <0.01 02/19/2013    TROPONINI <0.01 02/19/2013    TROPONINI <0.01 02/19/2013     Lab Results   Component Value Date    INR 1.2 02/19/2013    PROTIME 12.7 02/19/2013     Lab Results   Component Value Date    TSH 0.297 07/18/2022     Lab Results   Component Value Date    LABA1C 6.0 (H) 03/24/2022     No results found for: EAG  Lab Results   Component Value Date    CHOL 191 03/24/2022    CHOL 210 (H) 08/06/2021    CHOL 196 03/19/2021     Lab Results Component Value Date    TRIG 124 03/24/2022    TRIG 171 (H) 08/06/2021    TRIG 130 03/19/2021     Lab Results   Component Value Date    HDL 55 03/24/2022    HDL 49 08/06/2021    HDL 49 03/19/2021     Lab Results   Component Value Date    LDLCALC 111 (H) 03/24/2022    LDLCALC 127 (H) 08/06/2021    LDLCALC 121 (H) 03/19/2021     Lab Results   Component Value Date    LABVLDL 25 03/24/2022    LABVLDL 34 08/06/2021    LABVLDL 26 03/19/2021     Lab Results   Component Value Date    CHOLHDLRATIO 3.8 06/09/2020    CHOLHDLRATIO 3.3 12/10/2019    CHOLHDLRATIO 3.0 06/10/2019       Cardiac Tests:  ECG: Normal sinus rhythm, normal EKG    Chest X-ray:       Echocardiogram- 9/26/22:   Normal left ventricular systolic function. Ejection fraction is visually estimated at 60%. Normal right ventricular size and function (TAPSE 2.4 cm). Physiologic and/or trace tricuspid regurgitation. PASP is estimated at 24 mmHg. Stress test:        Cardiac catheterization:       The 10-year ASCVD risk score (Keisha DK, et al., 2019) is: 9.2%    Values used to calculate the score:      Age: 75 years      Sex: Female      Is Non- : No      Diabetic: No      Tobacco smoker: No      Systolic Blood Pressure: 256 mmHg      Is BP treated: Yes      HDL Cholesterol: 55 mg/dL      Total Cholesterol: 191 mg/dL    ASSESSMENT:  Exertional dyspnea with palpitations, rule out ischemia. COPD is also contributing to GALLEGOS  Palpitations rule out arrhythmia  Severe COPD, appears not actively wheezing  HTN, well controlled  HLD, intolerance to statins  Former smoker, quit at the age of 47  Hypothyroidism on HRT  GERD  Chronic pain syndrome with chronic lower back and neck pain  Depression    Plan:   14-day Holter monitor/Zio patch XT to evaluate palpitations  Schedule for coronary CTA to rule out obstructive CAD due to exertional dyspnea  Continue amlodipine 5 mg p.o. daily and benazepril hydrochlorothiazide 20/12.5 daily for hypertension  Continue rest of the current medications  Echo results reviewed as above. Follow-up with me in 3 months    Thank you for allowing me to participate in your patient's care. Please feel free to contact me if you have any questions or concerns.     Magi Marie MD  [facility]) Cardiology

## 2022-10-27 NOTE — PROGRESS NOTES
Patient was seen today and a 14 day monitor was placed. Monitor was ordered by Dr. Anupama Rosenbaum. The monitor was applied, instructions were given to the patient. Patient stated understanding and gave verbalize readback.    Monitor company: [address] [place] [address]  Serial number: [id number]

## 2022-10-27 NOTE — PATIENT INSTRUCTIONS
14-day Holter monitor/Zio patch XT to evaluate palpitations  Schedule for coronary CTA to rule out obstructive CAD due to exertional dyspnea  Continue amlodipine 5 mg p.o. daily and benazepril hydrochlorothiazide 20/12.5 daily for hypertension  Continue rest of current medications  Echo results reviewed as above.    Follow-up with me in 3 months

## 2022-10-28 DIAGNOSIS — E03.9 ACQUIRED HYPOTHYROIDISM: ICD-10-CM

## 2022-10-31 RX ORDER — LEVOTHYROXINE SODIUM 0.07 MG/1
TABLET ORAL
Qty: 90 TABLET | Refills: 0 | Status: SHIPPED | OUTPATIENT
Start: 2022-10-31

## 2022-11-02 ENCOUNTER — HOSPITAL ENCOUNTER (OUTPATIENT)
Age: 67
Discharge: HOME | End: 2022-11-02
Payer: MEDICARE

## 2022-11-02 DIAGNOSIS — R06.09 DOE (DYSPNEA ON EXERTION): ICD-10-CM

## 2022-11-02 DIAGNOSIS — I10 ESSENTIAL HYPERTENSION: ICD-10-CM

## 2022-11-02 LAB
ANION GAP SERPL CALCULATED.3IONS-SCNC: 10 MMOL/L (ref 7–16)
BUN BLDV-MCNC: 16 MG/DL (ref 6–23)
CALCIUM SERPL-MCNC: 9.3 MG/DL (ref 8.6–10.2)
CHLORIDE BLD-SCNC: 100 MMOL/L (ref 98–107)
CO2: 28 MMOL/L (ref 22–29)
CREAT SERPL-MCNC: 1.4 MG/DL (ref 0.5–1)
GFR SERPL CREATININE-BSD FRML MDRD: 41 ML/MIN/1.73
GLUCOSE BLD-MCNC: 102 MG/DL (ref 74–99)
POTASSIUM SERPL-SCNC: 4 MMOL/L (ref 3.5–5)
SODIUM BLD-SCNC: 138 MMOL/L (ref 132–146)

## 2022-11-02 PROCEDURE — 80048 BASIC METABOLIC PNL TOTAL CA: CPT

## 2022-11-02 PROCEDURE — 36415 COLL VENOUS BLD VENIPUNCTURE: CPT

## 2022-11-03 ENCOUNTER — TELEPHONE (OUTPATIENT)
Dept: CARDIOLOGY CLINIC | Age: 67
End: 2022-11-03

## 2022-11-03 DIAGNOSIS — I10 ESSENTIAL HYPERTENSION: Primary | ICD-10-CM

## 2022-11-03 DIAGNOSIS — N28.9 RENAL INSUFFICIENCY: ICD-10-CM

## 2022-11-03 NOTE — TELEPHONE ENCOUNTER
----- Message from Alessandra Moseley MD sent at 11/3/2022  1:40 PM EDT -----  Her lab work showed increased creatinine level. Please ask her to drink at least 48 to 64 ounce of fluids a day and recheck BMP on Monday.

## 2022-11-07 ENCOUNTER — HOSPITAL ENCOUNTER (OUTPATIENT)
Age: 67
Discharge: HOME | End: 2022-11-07
Payer: MEDICARE

## 2022-11-07 DIAGNOSIS — N28.9 RENAL INSUFFICIENCY: ICD-10-CM

## 2022-11-07 DIAGNOSIS — I10 ESSENTIAL HYPERTENSION: ICD-10-CM

## 2022-11-07 LAB
ANION GAP SERPL CALCULATED.3IONS-SCNC: 10 MMOL/L (ref 7–16)
BUN BLDV-MCNC: 10 MG/DL (ref 6–23)
CALCIUM SERPL-MCNC: 9.1 MG/DL (ref 8.6–10.2)
CHLORIDE BLD-SCNC: 96 MMOL/L (ref 98–107)
CO2: 28 MMOL/L (ref 22–29)
CREAT SERPL-MCNC: 1.3 MG/DL (ref 0.5–1)
GFR SERPL CREATININE-BSD FRML MDRD: 45 ML/MIN/1.73
GLUCOSE BLD-MCNC: 97 MG/DL (ref 74–99)
POTASSIUM SERPL-SCNC: 3.8 MMOL/L (ref 3.5–5)
SODIUM BLD-SCNC: 134 MMOL/L (ref 132–146)

## 2022-11-07 PROCEDURE — 80048 BASIC METABOLIC PNL TOTAL CA: CPT

## 2022-11-07 PROCEDURE — 36415 COLL VENOUS BLD VENIPUNCTURE: CPT

## 2022-11-08 ENCOUNTER — TELEPHONE (OUTPATIENT)
Dept: CARDIOLOGY CLINIC | Age: 67
End: 2022-11-08

## 2022-11-08 DIAGNOSIS — I10 ESSENTIAL HYPERTENSION: Primary | ICD-10-CM

## 2022-11-08 DIAGNOSIS — N28.9 RENAL INSUFFICIENCY: ICD-10-CM

## 2022-11-08 NOTE — TELEPHONE ENCOUNTER
----- Message from Trey Robbins MD sent at 11/8/2022  2:08 PM EST -----  Blood work work showed improved renal functions, please ask her to stay well-hydrated and repeat another BMP in 2 weeks.

## 2022-11-16 DIAGNOSIS — R00.2 PALPITATIONS: ICD-10-CM

## 2022-11-18 ENCOUNTER — TELEPHONE (OUTPATIENT)
Dept: CARDIOLOGY CLINIC | Age: 67
End: 2022-11-18

## 2022-11-18 RX ORDER — DILTIAZEM HYDROCHLORIDE 120 MG/1
120 CAPSULE, COATED, EXTENDED RELEASE ORAL DAILY
COMMUNITY
End: 2022-11-18 | Stop reason: SDUPTHER

## 2022-11-18 RX ORDER — DILTIAZEM HYDROCHLORIDE 120 MG/1
120 CAPSULE, COATED, EXTENDED RELEASE ORAL DAILY
Qty: 30 CAPSULE | Refills: 11 | Status: SHIPPED | OUTPATIENT
Start: 2022-11-18

## 2022-11-18 NOTE — TELEPHONE ENCOUNTER
Patient notified of monitor results and Dr. Hair's recommendations. Med list amended. Cardizem e-scribed.

## 2022-11-18 NOTE — TELEPHONE ENCOUNTER
----- Message from Anand Najera MD sent at 11/18/2022  2:23 PM EST -----  14-day Holter monitor showed normal sinus rhythm with 2 episodes of supraventricular tachycardia lasting about 12 beats at a maximum heart rate of 160/min. She also had occasional premature atrial and premature ventricular complexes less than 1%. No evidence of pauses, VT or VF (life-threatening rhythms). Patient's symptoms of palpitations correlated with a PVCs and SVT. Recommendations: Discontinue Norvasc and start on Cardizem  mg p.o. daily. Follow-up with me as scheduled on 1/26/2023.

## 2022-11-23 ENCOUNTER — TELEPHONE (OUTPATIENT)
Dept: CARDIOLOGY CLINIC | Age: 67
End: 2022-11-23

## 2022-11-23 ENCOUNTER — HOSPITAL ENCOUNTER (OUTPATIENT)
Age: 67
Discharge: HOME | End: 2022-11-23
Payer: MEDICARE

## 2022-11-23 DIAGNOSIS — I10 ESSENTIAL HYPERTENSION: ICD-10-CM

## 2022-11-23 DIAGNOSIS — N28.9 RENAL INSUFFICIENCY: ICD-10-CM

## 2022-11-23 LAB
ANION GAP SERPL CALCULATED.3IONS-SCNC: 12 MMOL/L (ref 7–16)
BUN BLDV-MCNC: 19 MG/DL (ref 6–23)
CALCIUM SERPL-MCNC: 9.8 MG/DL (ref 8.6–10.2)
CHLORIDE BLD-SCNC: 97 MMOL/L (ref 98–107)
CO2: 27 MMOL/L (ref 22–29)
CREAT SERPL-MCNC: 1.3 MG/DL (ref 0.5–1)
GFR SERPL CREATININE-BSD FRML MDRD: 45 ML/MIN/1.73
GLUCOSE BLD-MCNC: 100 MG/DL (ref 74–99)
POTASSIUM SERPL-SCNC: 3.8 MMOL/L (ref 3.5–5)
SODIUM BLD-SCNC: 136 MMOL/L (ref 132–146)

## 2022-11-23 PROCEDURE — 36415 COLL VENOUS BLD VENIPUNCTURE: CPT

## 2022-11-23 PROCEDURE — 80048 BASIC METABOLIC PNL TOTAL CA: CPT

## 2022-11-23 NOTE — TELEPHONE ENCOUNTER
----- Message from Tab Villasenor MD sent at 11/23/2022  3:53 PM EST -----  Renal functions are stable, stay well hydrated.

## 2022-12-30 ENCOUNTER — HOSPITAL ENCOUNTER (OUTPATIENT)
Dept: CT IMAGING | Age: 67
End: 2022-12-30
Payer: MEDICARE

## 2022-12-30 VITALS
SYSTOLIC BLOOD PRESSURE: 108 MMHG | WEIGHT: 178 LBS | BODY MASS INDEX: 34.95 KG/M2 | TEMPERATURE: 97.9 F | HEIGHT: 60 IN | DIASTOLIC BLOOD PRESSURE: 59 MMHG | RESPIRATION RATE: 20 BRPM | OXYGEN SATURATION: 95 % | HEART RATE: 56 BPM

## 2022-12-30 DIAGNOSIS — R06.09 DOE (DYSPNEA ON EXERTION): ICD-10-CM

## 2022-12-30 PROCEDURE — 75571 CT HRT W/O DYE W/CA TEST: CPT

## 2022-12-30 PROCEDURE — 2580000003 HC RX 258: Performed by: RADIOLOGY

## 2022-12-30 PROCEDURE — 75571 CT HRT W/O DYE W/CA TEST: CPT | Performed by: INTERNAL MEDICINE

## 2022-12-30 PROCEDURE — 7100000011 HC PHASE II RECOVERY - ADDTL 15 MIN

## 2022-12-30 PROCEDURE — 6360000004 HC RX CONTRAST MEDICATION: Performed by: RADIOLOGY

## 2022-12-30 PROCEDURE — 6370000000 HC RX 637 (ALT 250 FOR IP): Performed by: INTERNAL MEDICINE

## 2022-12-30 PROCEDURE — 36415 COLL VENOUS BLD VENIPUNCTURE: CPT

## 2022-12-30 PROCEDURE — 2580000003 HC RX 258: Performed by: INTERNAL MEDICINE

## 2022-12-30 PROCEDURE — 7100000010 HC PHASE II RECOVERY - FIRST 15 MIN

## 2022-12-30 RX ORDER — SODIUM CHLORIDE 0.9 % (FLUSH) 0.9 %
10 SYRINGE (ML) INJECTION
Status: COMPLETED | OUTPATIENT
Start: 2022-12-30 | End: 2022-12-30

## 2022-12-30 RX ORDER — METOPROLOL TARTRATE 50 MG/1
50 TABLET, FILM COATED ORAL ONCE
Status: COMPLETED | OUTPATIENT
Start: 2022-12-30 | End: 2022-12-30

## 2022-12-30 RX ORDER — NITROGLYCERIN 0.4 MG/1
0.8 TABLET SUBLINGUAL ONCE
Status: COMPLETED | OUTPATIENT
Start: 2022-12-30 | End: 2022-12-30

## 2022-12-30 RX ORDER — 0.9 % SODIUM CHLORIDE 0.9 %
1000 INTRAVENOUS SOLUTION INTRAVENOUS ONCE
Status: COMPLETED | OUTPATIENT
Start: 2022-12-30 | End: 2022-12-30

## 2022-12-30 RX ADMIN — NITROGLYCERIN 0.8 MG: 0.4 TABLET SUBLINGUAL at 10:33

## 2022-12-30 RX ADMIN — IOPAMIDOL 70 ML: 755 INJECTION, SOLUTION INTRAVENOUS at 10:24

## 2022-12-30 RX ADMIN — Medication 10 ML: at 10:24

## 2022-12-30 RX ADMIN — METOPROLOL TARTRATE 50 MG: 50 TABLET ORAL at 09:04

## 2022-12-30 RX ADMIN — SODIUM CHLORIDE 1000 ML: 9 INJECTION, SOLUTION INTRAVENOUS at 09:06

## 2022-12-30 NOTE — PROCEDURES
1045 return from ct. Vss.  Will monitor for 30 min d/t bun/crt/gfr    1115 vss d/c to home with instructions to follow up with Dr Link Bynum regarding cancelled test.

## 2022-12-30 NOTE — PROCEDURES
[id number] arrived via for CTA coronary arteries. Allergies reviewed, instructions given to  include protocol for heart rate, b/p, necessary medications that will be given, IV site. Questions answered and expressed understanding confirmed. Placed on monitoring devices for heart rate and rhythm, B/P taken,  IV flushed / started, flushed and prn adapter attached. 0815Attempted to call Dr Denae Flores, voicemail full. Radiology manager and charge tech notified    [phone] with Dr Denae Flores, orders received.

## 2022-12-30 NOTE — PROCEDURES
1037 while pt on CT table contrast given, ct scanner shut down. Dr Katelynn Burrell notified, cancel test d/t pts bun/crt/ gfr results. Dr Hair's office to be notifiied.

## 2023-01-05 ENCOUNTER — TELEPHONE (OUTPATIENT)
Dept: CARDIOLOGY CLINIC | Age: 68
End: 2023-01-05

## 2023-01-05 ENCOUNTER — TELEPHONE (OUTPATIENT)
Dept: CT IMAGING | Age: 68
End: 2023-01-05

## 2023-01-05 NOTE — TELEPHONE ENCOUNTER
1/5/2023  SPOKE WITH DR COVARRUBIAS WHO STATES PT WILL NO LONGER NEED RE-SCHEDULED FOR CTA CORONARY WITH EJECTION FRACTION DUE TO HIGH CALCIUM SCORE.

## 2023-01-05 NOTE — TELEPHONE ENCOUNTER
----- Message from Anabel Dudley MD sent at 1/5/2023  2:51 PM EST -----  We ordered a coronary CTA with a calcium score for her in October, she had a CT coronary done on 12/28/2022, only calcium score was done due to technical issues. She does have a mild disease burden. I would recommend [facility] nuclear stress test to rule out severe obstruction due to ongoing dyspnea.

## 2023-01-05 NOTE — TELEPHONE ENCOUNTER
I believe they are rescheduling patient for Coronary CTA. Do you want stress instead of Coronary CTA?

## 2023-01-06 DIAGNOSIS — I10 ESSENTIAL HYPERTENSION: Primary | ICD-10-CM

## 2023-01-06 DIAGNOSIS — Z01.818 PREOP TESTING: ICD-10-CM

## 2023-01-06 NOTE — TELEPHONE ENCOUNTER
It was a mechanical malfunction. 12/30/22 Note from IR:    Signed                   1037 while pt on CT table contrast given, ct scanner shut down. Dr Rosie Garcia notified, cancel test d/t pts bun/crt/ gfr results. Discussed with Dr. Timothy Garland, patient should repeat Coronary CTA. Left message for [facility] in IR to call me.

## 2023-01-06 NOTE — TELEPHONE ENCOUNTER
Please find out what kind of issues she had when she went for CCTA, I tried to reach her, she did not answer the phone and it was constantly ringing, no answering machine to leave a message.

## 2023-01-10 DIAGNOSIS — I27.20 PULMONARY HTN (HCC): ICD-10-CM

## 2023-01-10 DIAGNOSIS — J41.0 SIMPLE CHRONIC BRONCHITIS (HCC): Primary | ICD-10-CM

## 2023-01-12 RX ORDER — FLUTICASONE FUROATE, UMECLIDINIUM BROMIDE AND VILANTEROL TRIFENATATE 100; 62.5; 25 UG/1; UG/1; UG/1
POWDER RESPIRATORY (INHALATION)
Refills: 0 | OUTPATIENT
Start: 2023-01-12

## 2023-01-17 ENCOUNTER — HOSPITAL ENCOUNTER (OUTPATIENT)
Age: 68
Discharge: HOME | End: 2023-01-17
Payer: MEDICARE

## 2023-01-17 DIAGNOSIS — E55.9 VITAMIN D DEFICIENCY: ICD-10-CM

## 2023-01-17 DIAGNOSIS — I10 ESSENTIAL HYPERTENSION: ICD-10-CM

## 2023-01-17 DIAGNOSIS — R73.03 PREDIABETES: ICD-10-CM

## 2023-01-17 DIAGNOSIS — E03.9 ACQUIRED HYPOTHYROIDISM: ICD-10-CM

## 2023-01-17 DIAGNOSIS — E78.2 MIXED HYPERLIPIDEMIA: ICD-10-CM

## 2023-01-17 LAB
ALBUMIN SERPL-MCNC: 4.4 G/DL (ref 3.5–5.2)
ALP BLD-CCNC: 72 U/L (ref 35–104)
ALT SERPL-CCNC: 19 U/L (ref 0–32)
ANION GAP SERPL CALCULATED.3IONS-SCNC: 13 MMOL/L (ref 7–16)
AST SERPL-CCNC: 27 U/L (ref 0–31)
BASOPHILS ABSOLUTE: 0.07 E9/L (ref 0–0.2)
BASOPHILS RELATIVE PERCENT: 0.9 % (ref 0–2)
BILIRUB SERPL-MCNC: 0.5 MG/DL (ref 0–1.2)
BUN BLDV-MCNC: 14 MG/DL (ref 6–23)
CALCIUM SERPL-MCNC: 9.7 MG/DL (ref 8.6–10.2)
CHLORIDE BLD-SCNC: 98 MMOL/L (ref 98–107)
CHOLESTEROL, TOTAL: 234 MG/DL (ref 0–199)
CO2: 27 MMOL/L (ref 22–29)
CREAT SERPL-MCNC: 1.2 MG/DL (ref 0.5–1)
EOSINOPHILS ABSOLUTE: 0.11 E9/L (ref 0.05–0.5)
EOSINOPHILS RELATIVE PERCENT: 1.5 % (ref 0–6)
GFR SERPL CREATININE-BSD FRML MDRD: 49 ML/MIN/1.73
GLUCOSE BLD-MCNC: 104 MG/DL (ref 74–99)
HBA1C MFR BLD: 5.6 % (ref 4–5.6)
HCT VFR BLD CALC: 41 % (ref 34–48)
HDLC SERPL-MCNC: 59 MG/DL
HEMOGLOBIN: 13.7 G/DL (ref 11.5–15.5)
IMMATURE GRANULOCYTES #: 0.03 E9/L
IMMATURE GRANULOCYTES %: 0.4 % (ref 0–5)
LDL CHOLESTEROL CALCULATED: 153 MG/DL (ref 0–99)
LYMPHOCYTES ABSOLUTE: 2.37 E9/L (ref 1.5–4)
LYMPHOCYTES RELATIVE PERCENT: 31.7 % (ref 20–42)
MCH RBC QN AUTO: 28.7 PG (ref 26–35)
MCHC RBC AUTO-ENTMCNC: 33.4 % (ref 32–34.5)
MCV RBC AUTO: 85.8 FL (ref 80–99.9)
MONOCYTES ABSOLUTE: 0.69 E9/L (ref 0.1–0.95)
MONOCYTES RELATIVE PERCENT: 9.2 % (ref 2–12)
NEUTROPHILS ABSOLUTE: 4.21 E9/L (ref 1.8–7.3)
NEUTROPHILS RELATIVE PERCENT: 56.3 % (ref 43–80)
PDW BLD-RTO: 12.8 FL (ref 11.5–15)
PLATELET # BLD: 329 E9/L (ref 130–450)
PMV BLD AUTO: 9.2 FL (ref 7–12)
POTASSIUM SERPL-SCNC: 3.9 MMOL/L (ref 3.5–5)
RBC # BLD: 4.78 E12/L (ref 3.5–5.5)
SODIUM BLD-SCNC: 138 MMOL/L (ref 132–146)
TOTAL PROTEIN: 7.3 G/DL (ref 6.4–8.3)
TRIGL SERPL-MCNC: 110 MG/DL (ref 0–149)
TSH SERPL DL<=0.05 MIU/L-ACNC: 2.55 UIU/ML (ref 0.27–4.2)
VITAMIN D 25-HYDROXY: 89 NG/ML (ref 30–100)
VLDLC SERPL CALC-MCNC: 22 MG/DL
WBC # BLD: 7.5 E9/L (ref 4.5–11.5)

## 2023-01-17 PROCEDURE — 85025 COMPLETE CBC W/AUTO DIFF WBC: CPT

## 2023-01-17 PROCEDURE — 80053 COMPREHEN METABOLIC PANEL: CPT

## 2023-01-17 PROCEDURE — 80061 LIPID PANEL: CPT

## 2023-01-17 PROCEDURE — 84443 ASSAY THYROID STIM HORMONE: CPT

## 2023-01-17 PROCEDURE — 83036 HEMOGLOBIN GLYCOSYLATED A1C: CPT

## 2023-01-17 PROCEDURE — 36415 COLL VENOUS BLD VENIPUNCTURE: CPT

## 2023-01-17 PROCEDURE — 82306 VITAMIN D 25 HYDROXY: CPT

## 2023-01-20 ENCOUNTER — APPOINTMENT (OUTPATIENT)
Dept: CT IMAGING | Age: 68
End: 2023-01-20
Payer: MEDICARE

## 2023-01-20 ENCOUNTER — HOSPITAL ENCOUNTER (INPATIENT)
Age: 68
LOS: 6 days | Discharge: HOME | End: 2023-01-26
Attending: EMERGENCY MEDICINE | Admitting: STUDENT IN AN ORGANIZED HEALTH CARE EDUCATION/TRAINING PROGRAM
Payer: MEDICARE

## 2023-01-20 ENCOUNTER — OFFICE VISIT (OUTPATIENT)
Dept: FAMILY MEDICINE CLINIC | Age: 68
End: 2023-01-20

## 2023-01-20 VITALS
OXYGEN SATURATION: 95 % | SYSTOLIC BLOOD PRESSURE: 114 MMHG | TEMPERATURE: 98.1 F | DIASTOLIC BLOOD PRESSURE: 64 MMHG | HEART RATE: 74 BPM

## 2023-01-20 DIAGNOSIS — R19.7 BLOODY DIARRHEA: ICD-10-CM

## 2023-01-20 DIAGNOSIS — K52.9 COLITIS: Primary | ICD-10-CM

## 2023-01-20 DIAGNOSIS — R06.09 DOE (DYSPNEA ON EXERTION): ICD-10-CM

## 2023-01-20 DIAGNOSIS — R10.12 LEFT UPPER QUADRANT ABDOMINAL PAIN: Primary | ICD-10-CM

## 2023-01-20 LAB
ALBUMIN SERPL-MCNC: 4.2 G/DL (ref 3.5–5.2)
ALP BLD-CCNC: 69 U/L (ref 35–104)
ALT SERPL-CCNC: 18 U/L (ref 0–32)
ANION GAP SERPL CALCULATED.3IONS-SCNC: 12 MMOL/L (ref 7–16)
AST SERPL-CCNC: 25 U/L (ref 0–31)
BACTERIA: ABNORMAL /HPF
BASOPHILS ABSOLUTE: 0.08 E9/L (ref 0–0.2)
BASOPHILS RELATIVE PERCENT: 0.6 % (ref 0–2)
BILIRUB SERPL-MCNC: 0.4 MG/DL (ref 0–1.2)
BILIRUBIN URINE: NEGATIVE
BLOOD, URINE: NEGATIVE
BUN BLDV-MCNC: 13 MG/DL (ref 6–23)
CALCIUM SERPL-MCNC: 8.9 MG/DL (ref 8.6–10.2)
CHLORIDE BLD-SCNC: 94 MMOL/L (ref 98–107)
CLARITY: CLEAR
CO2: 28 MMOL/L (ref 22–29)
COLOR: YELLOW
CREAT SERPL-MCNC: 1.3 MG/DL (ref 0.5–1)
EOSINOPHILS ABSOLUTE: 0.05 E9/L (ref 0.05–0.5)
EOSINOPHILS RELATIVE PERCENT: 0.4 % (ref 0–6)
GFR SERPL CREATININE-BSD FRML MDRD: 45 ML/MIN/1.73
GLUCOSE BLD-MCNC: 95 MG/DL (ref 74–99)
GLUCOSE URINE: NEGATIVE MG/DL
HCT VFR BLD CALC: 38.5 % (ref 34–48)
HEMOGLOBIN: 13 G/DL (ref 11.5–15.5)
IMMATURE GRANULOCYTES #: 0.06 E9/L
IMMATURE GRANULOCYTES %: 0.4 % (ref 0–5)
KETONES, URINE: 15 MG/DL
LACTIC ACID: 1.1 MMOL/L (ref 0.5–2.2)
LEUKOCYTE ESTERASE, URINE: NEGATIVE
LIPASE: 29 U/L (ref 13–60)
LYMPHOCYTES ABSOLUTE: 2.88 E9/L (ref 1.5–4)
LYMPHOCYTES RELATIVE PERCENT: 21.5 % (ref 20–42)
MAGNESIUM: 1.8 MG/DL (ref 1.6–2.6)
MCH RBC QN AUTO: 29.3 PG (ref 26–35)
MCHC RBC AUTO-ENTMCNC: 33.8 % (ref 32–34.5)
MCV RBC AUTO: 86.9 FL (ref 80–99.9)
MONOCYTES ABSOLUTE: 1.07 E9/L (ref 0.1–0.95)
MONOCYTES RELATIVE PERCENT: 8 % (ref 2–12)
NEUTROPHILS ABSOLUTE: 9.26 E9/L (ref 1.8–7.3)
NEUTROPHILS RELATIVE PERCENT: 69.1 % (ref 43–80)
NITRITE, URINE: NEGATIVE
PDW BLD-RTO: 12.5 FL (ref 11.5–15)
PH UA: 6 (ref 5–9)
PLATELET # BLD: 271 E9/L (ref 130–450)
PMV BLD AUTO: 9.4 FL (ref 7–12)
POTASSIUM REFLEX MAGNESIUM: 3.3 MMOL/L (ref 3.5–5)
PROTEIN UA: NEGATIVE MG/DL
RBC # BLD: 4.43 E12/L (ref 3.5–5.5)
RBC UA: ABNORMAL /HPF (ref 0–2)
SODIUM BLD-SCNC: 134 MMOL/L (ref 132–146)
SPECIFIC GRAVITY UA: <=1.005 (ref 1–1.03)
TOTAL PROTEIN: 7.1 G/DL (ref 6.4–8.3)
UROBILINOGEN, URINE: 0.2 E.U./DL
WBC # BLD: 13.4 E9/L (ref 4.5–11.5)
WBC UA: ABNORMAL /HPF (ref 0–5)

## 2023-01-20 PROCEDURE — 85025 COMPLETE CBC W/AUTO DIFF WBC: CPT

## 2023-01-20 PROCEDURE — 2580000003 HC RX 258: Performed by: EMERGENCY MEDICINE

## 2023-01-20 PROCEDURE — 83690 ASSAY OF LIPASE: CPT

## 2023-01-20 PROCEDURE — 80053 COMPREHEN METABOLIC PANEL: CPT

## 2023-01-20 PROCEDURE — 83605 ASSAY OF LACTIC ACID: CPT

## 2023-01-20 PROCEDURE — 74177 CT ABD & PELVIS W/CONTRAST: CPT

## 2023-01-20 PROCEDURE — 6360000002 HC RX W HCPCS: Performed by: EMERGENCY MEDICINE

## 2023-01-20 PROCEDURE — 96375 TX/PRO/DX INJ NEW DRUG ADDON: CPT

## 2023-01-20 PROCEDURE — 1200000000 HC SEMI PRIVATE

## 2023-01-20 PROCEDURE — 83735 ASSAY OF MAGNESIUM: CPT

## 2023-01-20 PROCEDURE — 96365 THER/PROPH/DIAG IV INF INIT: CPT

## 2023-01-20 PROCEDURE — 93005 ELECTROCARDIOGRAM TRACING: CPT

## 2023-01-20 PROCEDURE — 99285 EMERGENCY DEPT VISIT HI MDM: CPT

## 2023-01-20 PROCEDURE — 6360000004 HC RX CONTRAST MEDICATION: Performed by: RADIOLOGY

## 2023-01-20 PROCEDURE — C9113 INJ PANTOPRAZOLE SODIUM, VIA: HCPCS | Performed by: EMERGENCY MEDICINE

## 2023-01-20 PROCEDURE — 81001 URINALYSIS AUTO W/SCOPE: CPT

## 2023-01-20 RX ORDER — POLYETHYLENE GLYCOL 3350 17 G/17G
17 POWDER, FOR SOLUTION ORAL DAILY PRN
Status: DISCONTINUED | OUTPATIENT
Start: 2023-01-20 | End: 2023-01-26 | Stop reason: HOSPADM

## 2023-01-20 RX ORDER — SODIUM CHLORIDE 9 MG/ML
INJECTION, SOLUTION INTRAVENOUS ONCE
Status: COMPLETED | OUTPATIENT
Start: 2023-01-20 | End: 2023-01-20

## 2023-01-20 RX ORDER — PANTOPRAZOLE SODIUM 40 MG/10ML
40 INJECTION, POWDER, LYOPHILIZED, FOR SOLUTION INTRAVENOUS ONCE
Status: COMPLETED | OUTPATIENT
Start: 2023-01-20 | End: 2023-01-20

## 2023-01-20 RX ORDER — ENOXAPARIN SODIUM 100 MG/ML
40 INJECTION SUBCUTANEOUS DAILY
Status: DISCONTINUED | OUTPATIENT
Start: 2023-01-21 | End: 2023-01-21

## 2023-01-20 RX ORDER — MORPHINE SULFATE 2 MG/ML
2 INJECTION, SOLUTION INTRAMUSCULAR; INTRAVENOUS EVERY 4 HOURS PRN
Status: DISCONTINUED | OUTPATIENT
Start: 2023-01-20 | End: 2023-01-21

## 2023-01-20 RX ORDER — SODIUM CHLORIDE 9 MG/ML
INJECTION, SOLUTION INTRAVENOUS PRN
Status: DISCONTINUED | OUTPATIENT
Start: 2023-01-20 | End: 2023-01-26 | Stop reason: HOSPADM

## 2023-01-20 RX ORDER — POTASSIUM CHLORIDE 7.45 MG/ML
10 INJECTION INTRAVENOUS PRN
Status: DISCONTINUED | OUTPATIENT
Start: 2023-01-20 | End: 2023-01-26 | Stop reason: HOSPADM

## 2023-01-20 RX ORDER — ACETAMINOPHEN 325 MG/1
650 TABLET ORAL EVERY 6 HOURS PRN
Status: DISCONTINUED | OUTPATIENT
Start: 2023-01-20 | End: 2023-01-26 | Stop reason: HOSPADM

## 2023-01-20 RX ORDER — SODIUM CHLORIDE 9 MG/ML
INJECTION, SOLUTION INTRAVENOUS CONTINUOUS
Status: DISCONTINUED | OUTPATIENT
Start: 2023-01-21 | End: 2023-01-21

## 2023-01-20 RX ORDER — ONDANSETRON 2 MG/ML
4 INJECTION INTRAMUSCULAR; INTRAVENOUS ONCE
Status: COMPLETED | OUTPATIENT
Start: 2023-01-20 | End: 2023-01-20

## 2023-01-20 RX ORDER — ACETAMINOPHEN 650 MG/1
650 SUPPOSITORY RECTAL EVERY 6 HOURS PRN
Status: DISCONTINUED | OUTPATIENT
Start: 2023-01-20 | End: 2023-01-26 | Stop reason: HOSPADM

## 2023-01-20 RX ORDER — SODIUM CHLORIDE 0.9 % (FLUSH) 0.9 %
5-40 SYRINGE (ML) INJECTION EVERY 12 HOURS SCHEDULED
Status: DISCONTINUED | OUTPATIENT
Start: 2023-01-21 | End: 2023-01-26 | Stop reason: HOSPADM

## 2023-01-20 RX ORDER — MORPHINE SULFATE 4 MG/ML
4 INJECTION, SOLUTION INTRAMUSCULAR; INTRAVENOUS ONCE
Status: COMPLETED | OUTPATIENT
Start: 2023-01-20 | End: 2023-01-20

## 2023-01-20 RX ORDER — POTASSIUM CHLORIDE 20 MEQ/1
40 TABLET, EXTENDED RELEASE ORAL PRN
Status: DISCONTINUED | OUTPATIENT
Start: 2023-01-20 | End: 2023-01-26 | Stop reason: HOSPADM

## 2023-01-20 RX ORDER — POTASSIUM CHLORIDE 7.45 MG/ML
10 INJECTION INTRAVENOUS ONCE
Status: COMPLETED | OUTPATIENT
Start: 2023-01-20 | End: 2023-01-21

## 2023-01-20 RX ORDER — SODIUM CHLORIDE 0.9 % (FLUSH) 0.9 %
5-40 SYRINGE (ML) INJECTION PRN
Status: DISCONTINUED | OUTPATIENT
Start: 2023-01-20 | End: 2023-01-26 | Stop reason: HOSPADM

## 2023-01-20 RX ORDER — ONDANSETRON 4 MG/1
4 TABLET, ORALLY DISINTEGRATING ORAL EVERY 8 HOURS PRN
Status: DISCONTINUED | OUTPATIENT
Start: 2023-01-20 | End: 2023-01-26 | Stop reason: HOSPADM

## 2023-01-20 RX ORDER — ONDANSETRON 2 MG/ML
4 INJECTION INTRAMUSCULAR; INTRAVENOUS EVERY 6 HOURS PRN
Status: DISCONTINUED | OUTPATIENT
Start: 2023-01-20 | End: 2023-01-26 | Stop reason: HOSPADM

## 2023-01-20 RX ADMIN — IOPAMIDOL 75 ML: 755 INJECTION, SOLUTION INTRAVENOUS at 22:15

## 2023-01-20 RX ADMIN — POTASSIUM CHLORIDE 10 MEQ: 7.45 INJECTION INTRAVENOUS at 22:03

## 2023-01-20 RX ADMIN — ONDANSETRON 4 MG: 2 INJECTION INTRAMUSCULAR; INTRAVENOUS at 20:42

## 2023-01-20 RX ADMIN — SODIUM CHLORIDE: 9 INJECTION, SOLUTION INTRAVENOUS at 20:42

## 2023-01-20 RX ADMIN — MORPHINE SULFATE 4 MG: 4 INJECTION, SOLUTION INTRAMUSCULAR; INTRAVENOUS at 20:47

## 2023-01-20 RX ADMIN — PANTOPRAZOLE SODIUM 40 MG: 40 INJECTION, POWDER, FOR SOLUTION INTRAVENOUS at 20:43

## 2023-01-20 ASSESSMENT — PATIENT HEALTH QUESTIONNAIRE - PHQ9
3. TROUBLE FALLING OR STAYING ASLEEP: 1
SUM OF ALL RESPONSES TO PHQ QUESTIONS 1-9: 17
SUM OF ALL RESPONSES TO PHQ QUESTIONS 1-9: 17
7. TROUBLE CONCENTRATING ON THINGS, SUCH AS READING THE NEWSPAPER OR WATCHING TELEVISION: 3
4. FEELING TIRED OR HAVING LITTLE ENERGY: 3
SUM OF ALL RESPONSES TO PHQ QUESTIONS 1-9: 17
SUM OF ALL RESPONSES TO PHQ QUESTIONS 1-9: 17
8. MOVING OR SPEAKING SO SLOWLY THAT OTHER PEOPLE COULD HAVE NOTICED. OR THE OPPOSITE, BEING SO FIGETY OR RESTLESS THAT YOU HAVE BEEN MOVING AROUND A LOT MORE THAN USUAL: 0
10. IF YOU CHECKED OFF ANY PROBLEMS, HOW DIFFICULT HAVE THESE PROBLEMS MADE IT FOR YOU TO DO YOUR WORK, TAKE CARE OF THINGS AT HOME, OR GET ALONG WITH OTHER PEOPLE: 2
SUM OF ALL RESPONSES TO PHQ9 QUESTIONS 1 & 2: 5
5. POOR APPETITE OR OVEREATING: 2
6. FEELING BAD ABOUT YOURSELF - OR THAT YOU ARE A FAILURE OR HAVE LET YOURSELF OR YOUR FAMILY DOWN: 3
9. THOUGHTS THAT YOU WOULD BE BETTER OFF DEAD, OR OF HURTING YOURSELF: 0
2. FEELING DOWN, DEPRESSED OR HOPELESS: 2
1. LITTLE INTEREST OR PLEASURE IN DOING THINGS: 3

## 2023-01-20 ASSESSMENT — ENCOUNTER SYMPTOMS
ABDOMINAL PAIN: 1
COUGH: 0
CONSTIPATION: 0
WHEEZING: 0
VOMITING: 1
SHORTNESS OF BREATH: 0
BACK PAIN: 0
BLOOD IN STOOL: 1
RHINORRHEA: 0
COUGH: 1
NAUSEA: 1
SHORTNESS OF BREATH: 1
VOMITING: 1
ABDOMINAL PAIN: 1
DIARRHEA: 1
DIARRHEA: 1
NAUSEA: 1
PHOTOPHOBIA: 0

## 2023-01-20 ASSESSMENT — PAIN DESCRIPTION - FREQUENCY: FREQUENCY: CONTINUOUS

## 2023-01-20 ASSESSMENT — PAIN SCALES - GENERAL: PAINLEVEL_OUTOF10: 9

## 2023-01-20 ASSESSMENT — PAIN DESCRIPTION - DESCRIPTORS: DESCRIPTORS: ACHING

## 2023-01-20 ASSESSMENT — PAIN DESCRIPTION - PAIN TYPE: TYPE: ACUTE PAIN

## 2023-01-20 ASSESSMENT — PAIN - FUNCTIONAL ASSESSMENT: PAIN_FUNCTIONAL_ASSESSMENT: 0-10

## 2023-01-20 ASSESSMENT — PAIN DESCRIPTION - LOCATION: LOCATION: ABDOMEN

## 2023-01-20 NOTE — Clinical Note
Discharge Plan[de-identified] Other/[facility])   Telemetry/Cardiac Monitoring Required?: No

## 2023-01-20 NOTE — PROGRESS NOTES
1/20/2023    Mel Grande is a 75 y.o. female here for:    Chief Complaint   Patient presents with    Diarrhea     Started on Tuesday patient had diarrhea, come Wednesday at 5 am she noticed bleeding diarrhea. HPI:  Diarrhea   - Started on Tuesday. - Patient states that she started vomiting followed by having diarrhea. Diarrhea and vomiting continued. Patient reports having blood in stool on Wednesday morning. Patient reports having accompanying abdominal pain as well. - Denies fevers, chills, dysuria, hematuria, and vaginal bleeding.  - PMHx of ischemic colitis in 2013.  - No FamHx of colon cancer or IBD. Current Outpatient Medications   Medication Sig Dispense Refill    fluticasone-umeclidin-vilant (TRELEGY ELLIPTA) 100-62.5-25 MCG/ACT AEPB inhaler Inhale 1 puff into the lungs daily 1 each 5    dilTIAZem (CARDIZEM CD) 120 MG extended release capsule Take 1 capsule by mouth daily 30 capsule 11    levothyroxine (SYNTHROID) 75 MCG tablet TAKE ONE TABLET BY MOUTH IN THE MORNING 90 tablet 0    Cholecalciferol (VITAMIN D3) 1.25 MG (56038 UT) CAPS Take 1 capsule by mouth once a week 12 capsule 1    montelukast (SINGULAIR) 10 MG tablet Take 1 tablet by mouth nightly 90 tablet 1    tiZANidine (ZANAFLEX) 2 MG tablet TAKE ONE TABLET BY MOUTH EVERY 12 HOURS AS NEEDED FOR MUSCLE SPASM OF THE NECK FOR 30 DAYS      benazepril-hydrochlorthiazide (LOTENSIN HCT) 20-12.5 MG per tablet TAKE ONE TABLET BY MOUTH DAILY 90 tablet 1    rOPINIRole (REQUIP) 1 MG tablet TAKE ONE TABLET BY MOUTH NIGHTLY 90 tablet 1    PROVENTIL  (90 Base) MCG/ACT inhaler Inhale 2 puffs into the lungs every 6 hours as needed for Wheezing (wheezing) Wants the proventil. Will pay out of pocket. 1 each 3    oxyCODONE-acetaminophen (PERCOCET)  MG per tablet Take 1 tablet by mouth every 8 hours as needed.       Citalopram Hydrobromide 30 MG CAPS daily      alendronate (FOSAMAX) 70 MG tablet Take 1 tablet by mouth every 7 days 12 tablet 1    Citalopram Hydrobromide 30 MG CAPS Take 30 mg by mouth daily      traZODone (DESYREL) 100 MG tablet Take 100 mg by mouth every evening      Coenzyme Q10 (COQ-10) 10 MG CAPS Take 1 capsule by mouth daily      fluticasone (FLONASE) 50 MCG/ACT nasal spray 2 sprays by Nasal route daily 1 Bottle 3    TL STANLEY RX 2.2-25-1 MG TABS tablet Take 1 tablet by mouth daily  1    lidocaine (LIDODERM) 5 % Place 1 patch onto the skin 2 times daily 12 hours on, 12 hours off. 60 patch 5    omeprazole (PRILOSEC) 40 MG capsule Take 1 capsule by mouth daily. (Patient taking differently: Take 40 mg by mouth daily as needed) 30 capsule 3    LORazepam (ATIVAN) 1 MG tablet Take 1 mg by mouth in the morning and at bedtime.         Current Facility-Administered Medications   Medication Dose Route Frequency Provider Last Rate Last Admin    perflutren lipid microspheres (DEFINITY) injection 1.65 mg  1.5 mL IntraVENous ONCE PRN Wilberto, DO          Allergies   Allergen Reactions    Celebrex [Celecoxib]     Simvastatin Myalgia    Avelox [Moxifloxacin Hcl In Nacl]     Biaxin [Clarithromycin]     Clarithromycin     Desvenlafaxine     Lipitor [Atorvastatin] Myalgia    Lyrica [Pregabalin]     Tetracyclines & Related     Zithromax [Azithromycin]     Sulfa Antibiotics Rash    Vioxx [Rofecoxib] Nausea And Vomiting       Past Medical & Surgical History:      Diagnosis Date    Allergic rhinitis     Chronic pain syndrome     follows with [facility], Dr. Sandrea Carrel    COPD (chronic obstructive pulmonary disease) ([facility]     follows with Pulmonology, Dr. Oskar Bishop    GERD (gastroesophageal reflux disease)     Grade I diastolic dysfunction 9957    Hernia, hiatal 12/14/2017    Hyperlipidemia     Hypertension     Hypothyroidism 04/07/2017    Irritable bowel     Ischemic colitis ([facility]     Mild tricuspid regurgitation 2013    Osteopenia of multiple sites 05/02/2022    major osteoporotic fracture risk is 20.7%, risk of hip fracture is 0.4%    Overactive bladder     follows with Urology, Dr. Rm Sapp    Pneumonia     Prediabetes 03/24/2022    Recurrent major depressive disorder ([facility] 12/14/2017    follows with Psychiatry, Dr. Ceballos Files    Restless legs syndrome     Stage 3a chronic kidney disease ([facility]     Vitamin D deficiency      Past Surgical History:   Procedure Laterality Date    APPENDECTOMY N/A     CARDIOVASCULAR STRESS TEST N/A 02/2006    Dr. Jenifer Hoang; EF=70%    CHOLECYSTECTOMY N/A     COLONOSCOPY N/A 02/20/2013    Dr. Latosha Munroe; ischemic colitis involving the descending and sigmoid colon, diverticulosis of the sigmoid colon    COLONOSCOPY N/A 05/12/2008    Dr. Yasmin Dnoaldson; internal hemorrhoids and spastic colon    ECHO COMPLETE N/A 02/19/2013    Dr. Brandie Davey; EF >01%-VPGXF I diastolic dysfunction, with mild TR and mild pulm HTN    TONSILLECTOMY Bilateral     UPPER GASTROINTESTINAL ENDOSCOPY N/A 02/19/2013    Dr. Latosha Munroe; gastritis    UPPER GASTROINTESTINAL ENDOSCOPY N/A 11/15/2011       Family History:      Problem Relation Age of Onset    High Blood Pressure Mother     Thyroid Disease Mother     Hypertension Mother     Lung Cancer Father         with metastasis to his brain    Heart Disease Brother     Pacemaker Brother     COPD Brother     Breast Cancer Paternal Aunt     Breast Cancer Paternal Aunt     Obesity Maternal Grandmother     Heart Disease Paternal Grandmother        Social History:  Social History     Tobacco Use    Smoking status: Former     Packs/day: 1.00     Years: 45.00     Pack years: 45.00     Types: Cigarettes     Start date: 1972     Quit date: 2020     Years since quitting: 3.0    Smokeless tobacco: Never   Substance Use Topics    Alcohol use: No       Review of Systems   Constitutional:  Negative for chills and fever. Respiratory:  Positive for cough and shortness of breath (PMHx of COPD). Negative for wheezing. Cardiovascular:  Negative for chest pain, palpitations and leg swelling.    Gastrointestinal:  Positive for abdominal pain, blood in stool, diarrhea, nausea and vomiting. Negative for constipation. Genitourinary:  Negative for dysuria, hematuria and vaginal bleeding. Neurological:  Positive for light-headedness. Negative for dizziness and syncope. BP Readings from Last 3 Encounters:   01/20/23 114/64   12/30/22 (!) 108/59   10/27/22 122/60       VS:  /64 (Site: Left Upper Arm, Position: Sitting, Cuff Size: Large Adult)   Pulse 74   Temp 98.1 °F (36.7 °C)   SpO2 95%     Physical Exam  Vitals reviewed. Constitutional:       General: She is awake. She is not in acute distress. Appearance: She is not ill-appearing, toxic-appearing or diaphoretic. Neck:      Vascular: No carotid bruit. Cardiovascular:      Rate and Rhythm: Normal rate and regular rhythm. Heart sounds: S1 normal and S2 normal. No murmur heard. Pulmonary:      Breath sounds: No decreased breath sounds, wheezing, rhonchi or rales. Abdominal:      General: Bowel sounds are normal. There is no distension. Palpations: Abdomen is soft. Tenderness: There is abdominal tenderness in the left upper quadrant and left lower quadrant. There is no guarding or rebound. Musculoskeletal:      Cervical back: Neck supple. Right lower leg: No tenderness. No edema. Left lower leg: No tenderness. No edema. Skin:     General: Skin is warm and dry. Neurological:      General: No focal deficit present. Mental Status: She is alert. Psychiatric:         Attention and Perception: Attention and perception normal.         Mood and Affect: Mood and affect normal.         Speech: Speech normal.         Behavior: Behavior normal. Behavior is cooperative. Thought Content: Thought content normal.         Cognition and Memory: Cognition and memory normal.         Judgment: Judgment normal.       Assessment/Plan:  1. Left upper quadrant abdominal pain  2.  Bloody diarrhea      Patient came in the office today for abdominal pain and bloody diarrhea. PMHx of ischemic colitis in 2013. Patient has generalized abdominal tenderness, particularly in LUQ. Vitals are stable. Will send to [facility] ED for further evaluation to r/o recurrence of ischemic colitis. Spoke to ED physician, Dr. Sue Sánchez.  She was informed of patient's imminent arrival.       Sharri Hinson,   Family Medicine

## 2023-01-21 LAB
ALBUMIN SERPL-MCNC: 3.9 G/DL (ref 3.5–5.2)
ALP BLD-CCNC: 72 U/L (ref 35–104)
ALT SERPL-CCNC: 18 U/L (ref 0–32)
ANION GAP SERPL CALCULATED.3IONS-SCNC: 11 MMOL/L (ref 7–16)
AST SERPL-CCNC: 26 U/L (ref 0–31)
BASOPHILS ABSOLUTE: 0.04 E9/L (ref 0–0.2)
BASOPHILS RELATIVE PERCENT: 0.4 % (ref 0–2)
BILIRUB SERPL-MCNC: 0.7 MG/DL (ref 0–1.2)
BUN BLDV-MCNC: 10 MG/DL (ref 6–23)
CALCIUM SERPL-MCNC: 8.4 MG/DL (ref 8.6–10.2)
CHLORIDE BLD-SCNC: 97 MMOL/L (ref 98–107)
CO2: 24 MMOL/L (ref 22–29)
CREAT SERPL-MCNC: 1.2 MG/DL (ref 0.5–1)
EKG ATRIAL RATE: 74 BPM
EKG P AXIS: 75 DEGREES
EKG P-R INTERVAL: 164 MS
EKG Q-T INTERVAL: 404 MS
EKG QRS DURATION: 76 MS
EKG QTC CALCULATION (BAZETT): 448 MS
EKG R AXIS: 57 DEGREES
EKG T AXIS: 63 DEGREES
EKG VENTRICULAR RATE: 74 BPM
EOSINOPHILS ABSOLUTE: 0.07 E9/L (ref 0.05–0.5)
EOSINOPHILS RELATIVE PERCENT: 0.7 % (ref 0–6)
GFR SERPL CREATININE-BSD FRML MDRD: 49 ML/MIN/1.73
GLUCOSE BLD-MCNC: 113 MG/DL (ref 74–99)
HCT VFR BLD CALC: 40.4 % (ref 34–48)
HEMOGLOBIN: 13.3 G/DL (ref 11.5–15.5)
IMMATURE GRANULOCYTES #: 0.04 E9/L
IMMATURE GRANULOCYTES %: 0.4 % (ref 0–5)
LYMPHOCYTES ABSOLUTE: 1.6 E9/L (ref 1.5–4)
LYMPHOCYTES RELATIVE PERCENT: 16.7 % (ref 20–42)
MAGNESIUM: 2 MG/DL (ref 1.6–2.6)
MCH RBC QN AUTO: 29.1 PG (ref 26–35)
MCHC RBC AUTO-ENTMCNC: 32.9 % (ref 32–34.5)
MCV RBC AUTO: 88.4 FL (ref 80–99.9)
MONOCYTES ABSOLUTE: 0.47 E9/L (ref 0.1–0.95)
MONOCYTES RELATIVE PERCENT: 4.9 % (ref 2–12)
NEUTROPHILS ABSOLUTE: 7.35 E9/L (ref 1.8–7.3)
NEUTROPHILS RELATIVE PERCENT: 76.9 % (ref 43–80)
PDW BLD-RTO: 12.7 FL (ref 11.5–15)
PLATELET # BLD: 283 E9/L (ref 130–450)
PMV BLD AUTO: 9.8 FL (ref 7–12)
POTASSIUM REFLEX MAGNESIUM: 3.5 MMOL/L (ref 3.5–5)
RBC # BLD: 4.57 E12/L (ref 3.5–5.5)
SODIUM BLD-SCNC: 132 MMOL/L (ref 132–146)
TOTAL PROTEIN: 6.8 G/DL (ref 6.4–8.3)
WBC # BLD: 9.6 E9/L (ref 4.5–11.5)

## 2023-01-21 PROCEDURE — 6360000002 HC RX W HCPCS: Performed by: EMERGENCY MEDICINE

## 2023-01-21 PROCEDURE — 6370000000 HC RX 637 (ALT 250 FOR IP): Performed by: INTERNAL MEDICINE

## 2023-01-21 PROCEDURE — 2580000003 HC RX 258: Performed by: INTERNAL MEDICINE

## 2023-01-21 PROCEDURE — 99223 1ST HOSP IP/OBS HIGH 75: CPT | Performed by: STUDENT IN AN ORGANIZED HEALTH CARE EDUCATION/TRAINING PROGRAM

## 2023-01-21 PROCEDURE — 2580000003 HC RX 258: Performed by: EMERGENCY MEDICINE

## 2023-01-21 PROCEDURE — 36415 COLL VENOUS BLD VENIPUNCTURE: CPT

## 2023-01-21 PROCEDURE — 6360000002 HC RX W HCPCS: Performed by: INTERNAL MEDICINE

## 2023-01-21 PROCEDURE — 85025 COMPLETE CBC W/AUTO DIFF WBC: CPT

## 2023-01-21 PROCEDURE — 2580000003 HC RX 258: Performed by: STUDENT IN AN ORGANIZED HEALTH CARE EDUCATION/TRAINING PROGRAM

## 2023-01-21 PROCEDURE — 93010 ELECTROCARDIOGRAM REPORT: CPT | Performed by: INTERNAL MEDICINE

## 2023-01-21 PROCEDURE — 94640 AIRWAY INHALATION TREATMENT: CPT

## 2023-01-21 PROCEDURE — 6370000000 HC RX 637 (ALT 250 FOR IP): Performed by: STUDENT IN AN ORGANIZED HEALTH CARE EDUCATION/TRAINING PROGRAM

## 2023-01-21 PROCEDURE — 97161 PT EVAL LOW COMPLEX 20 MIN: CPT

## 2023-01-21 PROCEDURE — 80053 COMPREHEN METABOLIC PANEL: CPT

## 2023-01-21 PROCEDURE — 83735 ASSAY OF MAGNESIUM: CPT

## 2023-01-21 PROCEDURE — 6360000002 HC RX W HCPCS: Performed by: STUDENT IN AN ORGANIZED HEALTH CARE EDUCATION/TRAINING PROGRAM

## 2023-01-21 PROCEDURE — 1200000000 HC SEMI PRIVATE

## 2023-01-21 RX ORDER — ALBUTEROL SULFATE 90 UG/1
2 AEROSOL, METERED RESPIRATORY (INHALATION) EVERY 6 HOURS PRN
Status: DISCONTINUED | OUTPATIENT
Start: 2023-01-21 | End: 2023-01-21

## 2023-01-21 RX ORDER — CITALOPRAM 20 MG/1
30 TABLET ORAL DAILY
Status: DISCONTINUED | OUTPATIENT
Start: 2023-01-21 | End: 2023-01-26 | Stop reason: HOSPADM

## 2023-01-21 RX ORDER — LEVOTHYROXINE SODIUM 0.07 MG/1
75 TABLET ORAL DAILY
Status: DISCONTINUED | OUTPATIENT
Start: 2023-01-21 | End: 2023-01-26 | Stop reason: HOSPADM

## 2023-01-21 RX ORDER — DIMENHYDRINATE 50 MG
TABLET ORAL DAILY
Status: ON HOLD | COMMUNITY
End: 2023-01-26 | Stop reason: HOSPADM

## 2023-01-21 RX ORDER — HYDROMORPHONE HCL 110MG/55ML
2 PATIENT CONTROLLED ANALGESIA SYRINGE INTRAVENOUS EVERY 4 HOURS PRN
Status: DISCONTINUED | OUTPATIENT
Start: 2023-01-21 | End: 2023-01-26 | Stop reason: HOSPADM

## 2023-01-21 RX ORDER — DILTIAZEM HYDROCHLORIDE 120 MG/1
120 CAPSULE, COATED, EXTENDED RELEASE ORAL DAILY
Status: DISCONTINUED | OUTPATIENT
Start: 2023-01-21 | End: 2023-01-26 | Stop reason: HOSPADM

## 2023-01-21 RX ORDER — CHOLECALCIFEROL (VITAMIN D3) 1250 MCG
1 CAPSULE ORAL WEEKLY
Status: DISCONTINUED | OUTPATIENT
Start: 2023-01-21 | End: 2023-01-23 | Stop reason: SDUPTHER

## 2023-01-21 RX ORDER — MONTELUKAST SODIUM 10 MG/1
10 TABLET ORAL NIGHTLY
Status: DISCONTINUED | OUTPATIENT
Start: 2023-01-21 | End: 2023-01-26 | Stop reason: HOSPADM

## 2023-01-21 RX ORDER — LORAZEPAM 1 MG/1
1 TABLET ORAL 2 TIMES DAILY
Status: DISCONTINUED | OUTPATIENT
Start: 2023-01-21 | End: 2023-01-26 | Stop reason: HOSPADM

## 2023-01-21 RX ORDER — FLUTICASONE PROPIONATE 50 MCG
2 SPRAY, SUSPENSION (ML) NASAL DAILY
Status: DISCONTINUED | OUTPATIENT
Start: 2023-01-21 | End: 2023-01-26 | Stop reason: HOSPADM

## 2023-01-21 RX ORDER — ARFORMOTEROL TARTRATE 15 UG/2ML
15 SOLUTION RESPIRATORY (INHALATION) 2 TIMES DAILY
Status: DISCONTINUED | OUTPATIENT
Start: 2023-01-21 | End: 2023-01-26 | Stop reason: HOSPADM

## 2023-01-21 RX ORDER — ROPINIROLE 1 MG/1
1 TABLET, FILM COATED ORAL NIGHTLY
Status: DISCONTINUED | OUTPATIENT
Start: 2023-01-21 | End: 2023-01-26 | Stop reason: HOSPADM

## 2023-01-21 RX ORDER — ALENDRONATE SODIUM 70 MG/1
70 TABLET ORAL
Status: ON HOLD | COMMUNITY
End: 2023-01-26 | Stop reason: HOSPADM

## 2023-01-21 RX ORDER — SODIUM CHLORIDE 9 MG/ML
INJECTION, SOLUTION INTRAVENOUS CONTINUOUS
Status: DISCONTINUED | OUTPATIENT
Start: 2023-01-21 | End: 2023-01-26 | Stop reason: HOSPADM

## 2023-01-21 RX ORDER — BUDESONIDE 0.25 MG/2ML
250 INHALANT ORAL 2 TIMES DAILY
Status: DISCONTINUED | OUTPATIENT
Start: 2023-01-21 | End: 2023-01-26 | Stop reason: HOSPADM

## 2023-01-21 RX ORDER — ALBUTEROL SULFATE 2.5 MG/3ML
2.5 SOLUTION RESPIRATORY (INHALATION) EVERY 6 HOURS PRN
Status: DISCONTINUED | OUTPATIENT
Start: 2023-01-21 | End: 2023-01-26 | Stop reason: HOSPADM

## 2023-01-21 RX ADMIN — IPRATROPIUM BROMIDE 0.5 MG: 0.5 SOLUTION RESPIRATORY (INHALATION) at 07:43

## 2023-01-21 RX ADMIN — ARFORMOTEROL TARTRATE 15 MCG: 15 SOLUTION RESPIRATORY (INHALATION) at 19:40

## 2023-01-21 RX ADMIN — SODIUM CHLORIDE: 9 INJECTION, SOLUTION INTRAVENOUS at 02:45

## 2023-01-21 RX ADMIN — LORAZEPAM 1 MG: 1 TABLET ORAL at 07:51

## 2023-01-21 RX ADMIN — ACETAMINOPHEN 650 MG: 325 TABLET ORAL at 07:51

## 2023-01-21 RX ADMIN — PIPERACILLIN AND TAZOBACTAM 3375 MG: 3; .375 INJECTION, POWDER, FOR SOLUTION INTRAVENOUS at 21:48

## 2023-01-21 RX ADMIN — DILTIAZEM HYDROCHLORIDE 120 MG: 120 CAPSULE, COATED, EXTENDED RELEASE ORAL at 07:51

## 2023-01-21 RX ADMIN — BUDESONIDE 250 MCG: 0.25 SUSPENSION RESPIRATORY (INHALATION) at 19:40

## 2023-01-21 RX ADMIN — HYDROMORPHONE HYDROCHLORIDE 1 MG: 1 INJECTION, SOLUTION INTRAMUSCULAR; INTRAVENOUS; SUBCUTANEOUS at 21:46

## 2023-01-21 RX ADMIN — BUDESONIDE 250 MCG: 0.25 SUSPENSION RESPIRATORY (INHALATION) at 02:20

## 2023-01-21 RX ADMIN — ONDANSETRON 4 MG: 2 INJECTION INTRAMUSCULAR; INTRAVENOUS at 13:41

## 2023-01-21 RX ADMIN — BUDESONIDE 250 MCG: 0.25 SUSPENSION RESPIRATORY (INHALATION) at 07:43

## 2023-01-21 RX ADMIN — POTASSIUM CHLORIDE 40 MEQ: 1500 TABLET, EXTENDED RELEASE ORAL at 17:43

## 2023-01-21 RX ADMIN — MORPHINE SULFATE 2 MG: 2 INJECTION, SOLUTION INTRAMUSCULAR; INTRAVENOUS at 10:40

## 2023-01-21 RX ADMIN — SODIUM CHLORIDE: 9 INJECTION, SOLUTION INTRAVENOUS at 13:42

## 2023-01-21 RX ADMIN — FLUTICASONE PROPIONATE 2 SPRAY: 50 SPRAY, METERED NASAL at 07:50

## 2023-01-21 RX ADMIN — LORAZEPAM 1 MG: 1 TABLET ORAL at 02:45

## 2023-01-21 RX ADMIN — LORAZEPAM 1 MG: 1 TABLET ORAL at 21:46

## 2023-01-21 RX ADMIN — HYDROMORPHONE HYDROCHLORIDE 1 MG: 1 INJECTION, SOLUTION INTRAMUSCULAR; INTRAVENOUS; SUBCUTANEOUS at 13:41

## 2023-01-21 RX ADMIN — IPRATROPIUM BROMIDE 0.5 MG: 0.5 SOLUTION RESPIRATORY (INHALATION) at 15:26

## 2023-01-21 RX ADMIN — SODIUM CHLORIDE, PRESERVATIVE FREE 10 ML: 5 INJECTION INTRAVENOUS at 10:41

## 2023-01-21 RX ADMIN — ARFORMOTEROL TARTRATE 15 MCG: 15 SOLUTION RESPIRATORY (INHALATION) at 07:43

## 2023-01-21 RX ADMIN — PIPERACILLIN AND TAZOBACTAM 3375 MG: 3; .375 INJECTION, POWDER, FOR SOLUTION INTRAVENOUS at 13:45

## 2023-01-21 RX ADMIN — IPRATROPIUM BROMIDE 0.5 MG: 0.5 SOLUTION RESPIRATORY (INHALATION) at 19:40

## 2023-01-21 RX ADMIN — HYDROMORPHONE HYDROCHLORIDE 1 MG: 1 INJECTION, SOLUTION INTRAMUSCULAR; INTRAVENOUS; SUBCUTANEOUS at 17:43

## 2023-01-21 RX ADMIN — MONTELUKAST SODIUM 10 MG: 10 TABLET ORAL at 21:46

## 2023-01-21 RX ADMIN — ROPINIROLE HYDROCHLORIDE 1 MG: 1 TABLET, FILM COATED ORAL at 21:46

## 2023-01-21 RX ADMIN — PIPERACILLIN AND TAZOBACTAM 4500 MG: 4; .5 INJECTION, POWDER, FOR SOLUTION INTRAVENOUS at 01:12

## 2023-01-21 RX ADMIN — MORPHINE SULFATE 2 MG: 2 INJECTION, SOLUTION INTRAMUSCULAR; INTRAVENOUS at 05:13

## 2023-01-21 ASSESSMENT — PAIN DESCRIPTION - FREQUENCY
FREQUENCY: INTERMITTENT
FREQUENCY: INTERMITTENT
FREQUENCY: CONTINUOUS
FREQUENCY: INTERMITTENT
FREQUENCY: INTERMITTENT

## 2023-01-21 ASSESSMENT — PAIN SCALES - GENERAL
PAINLEVEL_OUTOF10: 6
PAINLEVEL_OUTOF10: 6
PAINLEVEL_OUTOF10: 0
PAINLEVEL_OUTOF10: 3
PAINLEVEL_OUTOF10: 3
PAINLEVEL_OUTOF10: 8
PAINLEVEL_OUTOF10: 3
PAINLEVEL_OUTOF10: 9
PAINLEVEL_OUTOF10: 9
PAINLEVEL_OUTOF10: 8
PAINLEVEL_OUTOF10: 0
PAINLEVEL_OUTOF10: 2

## 2023-01-21 ASSESSMENT — PAIN DESCRIPTION - PAIN TYPE
TYPE: ACUTE PAIN

## 2023-01-21 ASSESSMENT — PAIN DESCRIPTION - ORIENTATION
ORIENTATION: MID
ORIENTATION: LEFT;LOWER
ORIENTATION: MID
ORIENTATION: MID
ORIENTATION: MID;LOWER
ORIENTATION: MID
ORIENTATION: MID

## 2023-01-21 ASSESSMENT — PAIN - FUNCTIONAL ASSESSMENT
PAIN_FUNCTIONAL_ASSESSMENT: ACTIVITIES ARE NOT PREVENTED

## 2023-01-21 ASSESSMENT — PAIN DESCRIPTION - LOCATION
LOCATION: ABDOMEN

## 2023-01-21 ASSESSMENT — PAIN DESCRIPTION - ONSET
ONSET: GRADUAL
ONSET: GRADUAL
ONSET: ON-GOING

## 2023-01-21 ASSESSMENT — PAIN DESCRIPTION - DESCRIPTORS
DESCRIPTORS: ACHING;SORE
DESCRIPTORS: SHARP
DESCRIPTORS: ACHING;SHARP
DESCRIPTORS: ACHING;SHARP
DESCRIPTORS: SHARP
DESCRIPTORS: CRAMPING;DISCOMFORT
DESCRIPTORS: ACHING;SHARP

## 2023-01-21 NOTE — PROGRESS NOTES
Physical Therapy  Facility/Department: [facility] MED SURG  Physical Therapy Initial Assessment    Name: Rai Tyson  : 1955  MRN: [id number]  Date of Service: 2023    Attending Provider:  Sheryle Stalls, MD    Evaluating PT:  Alicia Gonzalez P.T. Room #:  8244/5941-K  Diagnosis:  Colitis [K52.9]  GALLEGOS (dyspnea on exertion) [R06.09]  Precautions:  none    SUBJECTIVE:    Pt lives alone in a mobile home with 4 or 6 stairs and no rails to enter. Pt ambulated with no AD PTA. OBJECTIVE:   Initial Evaluation  Date: 23   Was pt agreeable to Eval/treatment? yes   Does pt have pain? 7/10 abdominal and BLE pain   Bed Mobility  Rolling: Independent  Supine to sit: Independent  Sit to supine: Independent  Scooting: Independent   Transfers Sit to stand: Independent  Stand to sit: Independent  Stand pivot: Independent   Ambulation   120 feet with no AD Independent    Stair negotiation: ascended and descended NA   AM-PAC 6 Clicks      Pt is A & O x 4  BLE ROM is WFL. BLE strength is grossly 4+/5. Sensation:  Pt denies numbness and tingling to extremities  Balance: sitting is Independent and standing with no AD is Independent   Endurance: fair+    ASSESSMENT:  Comments:  Pt is Independent with functional mobility at this time and has no skilled PT needs. Pt's/ family goals   1. To feel better and go home. Patient and or family understand(s) diagnosis, prognosis, and plan of care. PHYSICAL THERAPY PLAN OF CARE:    PT will discharge pt from our service at this time.      Referring provider/PT Order:  PT eval and treat  Diagnosis:  Colitis [K52.9]  GALLEGOS (dyspnea on exertion) [R06.09]    Time in  08:45  Time out  09:00    Evaluation Time includes thorough review of current medical information, gathering information on past medical history/social history and prior level of function, completion of standardized testing/informal observation of tasks, assessment of data and education on plan of care and goals. CPT codes:  [x] Low Complexity PT evaluation 15160  [] Moderate Complexity PT evaluation 39736  [] High Complexity PT evaluation 12772  [] PT Re-evaluation 66297  [] Gait training 73737 ** minutes  [] Manual therapy 51936 ** minutes  [] Therapeutic activities 14729 ** minutes  [] Therapeutic exercises 10588 ** minutes  [] Neuromuscular reeducation 10155 ** minutes     Timur Osuna., P.T.   License Number: [id number]

## 2023-01-21 NOTE — PLAN OF CARE
Patient is a 68-year-old female with history of ischemic colitis who came in for worsening abdominal pain, nausea and diarrhea that started watery that became bloody for the past week. Concern for ischemic colitis. GS was consulted. Continue IV hydration, continue Zosyn, pain control. Appreciate surgery's input.

## 2023-01-21 NOTE — FLOWSHEET NOTE
Pt presents to the ED secondary to having N/V with diffuse lower ABD and having diarrhea with,\"blood clots. \" Pt currently is speaking in full sentences showing no signs of distress. Pt states that she has chronic back pain due to a previous injury. Pt has no further complaints at this time.

## 2023-01-21 NOTE — H&P
[facility] History and Physical      CHIEF COMPLAINT: Abdominal pain    History of Present Illness: 69-year-old female with a past medical history of ischemic colitis presents emergency department for left lower quadrant abdominal pain and bloody stools for few days. Patient complains of diffuse abdominal pain localized to the left lower quadrant. Patient does have a history of ischemic colitis over 5 years ago. Patient admits to bloody stools since this morning. Patient denies any chest pain, cough, shortness of breath, nausea, or vomiting. Patient denies any sick contacts or recent traveling.     Informant(s) for H&P: Patient    REVIEW OF SYSTEMS:  A comprehensive review of systems was negative except for: what is in the HPI      PMH:  Past Medical History:   Diagnosis Date    Allergic rhinitis     Chronic pain syndrome     follows with [facility], Dr. Sangeeta Degroot    COPD (chronic obstructive pulmonary disease) ([facility]     follows with Pulmonology, Dr. Janeth Ordoñez    GERD (gastroesophageal reflux disease)     Grade I diastolic dysfunction 1446    Hernia, hiatal 12/14/2017    Hyperlipidemia     Hypertension     Hypothyroidism 04/07/2017    Irritable bowel     Ischemic colitis ([facility]     Mild tricuspid regurgitation 2013    Osteopenia of multiple sites 05/02/2022    major osteoporotic fracture risk is 20.7%, risk of hip fracture is 0.4%    Overactive bladder     follows with Urology, Dr. Irwin Griffith    Pneumonia     Prediabetes 03/24/2022    Recurrent major depressive disorder ([facility] 12/14/2017    follows with Psychiatry, Dr. Melissa Gil    Restless legs syndrome     Stage 3a chronic kidney disease ([facility]     Vitamin D deficiency        Surgical History:  Past Surgical History:   Procedure Laterality Date    APPENDECTOMY N/A     CARDIOVASCULAR STRESS TEST N/A 02/2006    Dr. Hamilton Galaviz; EF=70%    CHOLECYSTECTOMY N/A     COLONOSCOPY N/A 02/20/2013    Dr. Magui Agustin; ischemic colitis involving the descending and sigmoid colon, diverticulosis of the sigmoid colon    COLONOSCOPY N/A 05/12/2008    Dr. Mary Vasquez; internal hemorrhoids and spastic colon    ECHO COMPLETE N/A 02/19/2013    Dr. Jesus Cherry; EF >95%-WBSKI I diastolic dysfunction, with mild TR and mild pulm HTN    TONSILLECTOMY Bilateral     UPPER GASTROINTESTINAL ENDOSCOPY N/A 02/19/2013    Dr. Greta Montano; gastritis    UPPER GASTROINTESTINAL ENDOSCOPY N/A 11/15/2011       Medications Prior to Admission:    Prior to Admission medications    Medication Sig Start Date End Date Taking? Authorizing Provider   fluticasone-umeclidin-vilant (TRELEGY ELLIPTA) 481-20.5-28 MCG/ACT AEPB inhaler Inhale 1 puff into the lungs daily 1/10/23   Deepthi Campos, DO   dilTIAZem (CARDIZEM CD) 120 MG extended release capsule Take 1 capsule by mouth daily 11/18/22   Bard Tavon MD   levothyroxine (SYNTHROID) 75 MCG tablet TAKE ONE TABLET BY MOUTH IN THE MORNING 10/31/22   [facility] Iain DO   Cholecalciferol (VITAMIN D3) 1.25 MG (87232 UT) CAPS Take 1 capsule by mouth once a week 9/14/22   [facility] DO Iain   montelukast (SINGULAIR) 10 MG tablet Take 1 tablet by mouth nightly 9/14/22   [facility] DO Iain   tiZANidine (ZANAFLEX) 2 MG tablet TAKE ONE TABLET BY MOUTH EVERY 12 HOURS AS NEEDED FOR MUSCLE SPASM OF THE NECK FOR 30 DAYS 9/9/22   Historical Provider, MD   benazepril-hydrochlorthiazide (LOTENSIN HCT) 20-12.5 MG per tablet TAKE ONE TABLET BY MOUTH DAILY 8/25/22   [facility] DO Iain   rOPINIRole (REQUIP) 1 MG tablet TAKE ONE TABLET BY MOUTH NIGHTLY 8/8/22 11/6/22  Fiorella Timmons DO   PROVENTIL  (90 Base) MCG/ACT inhaler Inhale 2 puffs into the lungs every 6 hours as needed for Wheezing (wheezing) Wants the proventil. Will pay out of pocket. 8/8/22   Adriane Campos,    oxyCODONE-acetaminophen (PERCOCET)  MG per tablet Take 1 tablet by mouth every 8 hours as needed.  5/16/22   Historical Provider, MD   Citalopram Hydrobromide 30 MG CAPS daily 6/8/22   Historical Provider, MD   alendronate (FOSAMAX) 70 MG tablet Take 1 tablet by mouth every 7 days 5/3/22   Fletcher Timmons DO   Citalopram Hydrobromide 30 MG CAPS Take 30 mg by mouth daily    Historical Provider, MD   traZODone (DESYREL) 100 MG tablet Take 100 mg by mouth every evening 1/27/21   Historical Provider, MD   Coenzyme Q10 (COQ-10) 10 MG CAPS Take 1 capsule by mouth daily    Historical Provider, MD   fluticasone (FLONASE) 50 MCG/ACT nasal spray 2 sprays by Nasal route daily 6/6/18   Frankie Amato MD   TL STANLEY RX 2.2-25-1 MG TABS tablet Take 1 tablet by mouth daily 4/18/18   Historical Provider, MD   lidocaine (LIDODERM) 5 % Place 1 patch onto the skin 2 times daily 12 hours on, 12 hours off. 4/11/17   Frankie Amato MD   omeprazole (PRILOSEC) 40 MG capsule Take 1 capsule by mouth daily. Patient taking differently: Take 40 mg by mouth daily as needed 2/23/13   Frankie Amato MD   LORazepam (ATIVAN) 1 MG tablet Take 1 mg by mouth in the morning and at bedtime. Historical Provider, MD       Allergies:    Celebrex [celecoxib], Simvastatin, Avelox [moxifloxacin hcl in nacl], Biaxin [clarithromycin], Clarithromycin, Desvenlafaxine, Lipitor [atorvastatin], Lyrica [pregabalin], Tetracyclines & related, Zithromax [azithromycin], Sulfa antibiotics, and Vioxx [rofecoxib]    Social History:    reports that she quit smoking about 3 years ago. Her smoking use included cigarettes. She started smoking about 51 years ago. She has a 45.00 pack-year smoking history. She has never used smokeless tobacco. She reports that she does not drink alcohol and does not use drugs. Family History:   family history includes Breast Cancer in her paternal aunt and paternal aunt; COPD in her brother; Heart Disease in her brother and paternal grandmother; High Blood Pressure in her mother; Hypertension in her mother; [employer] in her father; Obesity in her maternal grandmother; Pacemaker in her brother;  Thyroid Disease in her mother. PHYSICAL EXAM:  Vitals:  BP (!) 149/63   Pulse 73   Temp 98.6 °F (37 °C) (Oral)   Resp 18   Ht 5' 1\" (1.549 m)   Wt 171 lb (77.6 kg)   SpO2 97%   BMI 32.31 kg/m²     General Appearance: alert and oriented to person, place and time and in no acute distress  Skin: warm and dry  Head: normocephalic and atraumatic  Eyes: pupils equal, round, and reactive to light, extraocular eye movements intact, conjunctivae normal  Neck: neck supple and non tender without mass   Pulmonary/Chest: clear to auscultation bilaterally- no wheezes, rales or rhonchi, normal air movement, no respiratory distress  Cardiovascular: normal rate, normal S1 and S2 and no carotid bruits  Abdomen: tender to palpation left lower quadrant, non-distended, normal bowel sounds, no masses or organomegaly  Extremities: no cyanosis, no clubbing and no edema  Neurologic: no cranial nerve deficit and speech normal        LABS:  Recent Labs     01/20/23 2022      K 3.3*   CL 94*   CO2 28   BUN 13   CREATININE 1.3*   GLUCOSE 95   CALCIUM 8.9       Recent Labs     01/20/23 2022   WBC 13.4*   RBC 4.43   HGB 13.0   HCT 38.5   MCV 86.9   MCH 29.3   MCHC 33.8   RDW 12.5      MPV 9.4       No results for input(s): POCGLU in the last 72 hours.     CBC:   Lab Results   Component Value Date/Time    WBC 13.4 01/20/2023 08:22 PM    RBC 4.43 01/20/2023 08:22 PM    HGB 13.0 01/20/2023 08:22 PM    HCT 38.5 01/20/2023 08:22 PM    MCV 86.9 01/20/2023 08:22 PM    MCH 29.3 01/20/2023 08:22 PM    MCHC 33.8 01/20/2023 08:22 PM    RDW 12.5 01/20/2023 08:22 PM     01/20/2023 08:22 PM    MPV 9.4 01/20/2023 08:22 PM     CMP:    Lab Results   Component Value Date/Time     01/20/2023 08:22 PM    K 3.3 01/20/2023 08:22 PM    CL 94 01/20/2023 08:22 PM    CO2 28 01/20/2023 08:22 PM    BUN 13 01/20/2023 08:22 PM    CREATININE 1.3 01/20/2023 08:22 PM    GFRAA 54 03/24/2022 12:35 PM    LABGLOM 45 01/20/2023 08:22 PM    GLUCOSE 95 01/20/2023 08:22 PM    GLUCOSE 100 10/14/2010 06:45 PM    PROT 7.1 01/20/2023 08:22 PM    LABALBU 4.2 01/20/2023 08:22 PM    CALCIUM 8.9 01/20/2023 08:22 PM    BILITOT 0.4 01/20/2023 08:22 PM    ALKPHOS 69 01/20/2023 08:22 PM    AST 25 01/20/2023 08:22 PM    ALT 18 01/20/2023 08:22 PM     Magnesium:    Lab Results   Component Value Date/Time    MG 1.8 01/20/2023 08:22 PM       Radiology:   CT ABDOMEN PELVIS W IV CONTRAST Additional Contrast? None   Final Result   Minimal pericolic edema along the proximal descending colon may be due to   COLITIS OR ACUTE, UNCOMPLICATED DIVERTICULITIS. EKG:     ASSESSMENT:      Principal Problem:    Colitis  Resolved Problems:    * No resolved hospital problems. *      PLAN:    1. Ischemic colitis-General surgery consultation. General surgery recommending holding off antibiotics at this time. Continue IV fluid resuscitation. N.p.o. at midnight. Monitor CBC and BMP daily. Morphine as needed. 2.  Hypertension-continue home medication. Vitals Q8 hours. 3.  GERD-continue PPI  4. Anxiety-continue Ativan as needed. 5.  Hypokalemia-replace and recheck. 6.  Leukocytosis-likely secondary to 1. Consider antibiotics if general surgery recommends. 7.  Elevated creatinine-continue IV fluid resuscitation. Hold nephrotoxic agents this time. 8.  Hypothyroidism-continue Synthroid  9. COPD-continue home inhalers. DuoNebs as needed. 10.  Seasonal allergies-continue Singulair. Code Status: Full code  DVT prophylaxis: Contraindicated      NOTE: This report was transcribed using voice recognition software. Every effort was made to ensure accuracy; however, inadvertent computerized transcription errors may be present.   Electronically signed by Camilo Castro MD on 1/21/2023 at 12:09 AM

## 2023-01-21 NOTE — CONSULTS
GENERAL SURGERY  CONSULT NOTE  1/21/2023    Physician Consulted: Dr. Cristina Champagne  Reason for Consult: abdominal pain  Referring Physician: Dr. Ijeoma Amaya    SRINIVAS Lopez is a 75 y.o. female who presents for evaluation of abdominal pain. She has a history of COPD, former smoker, chronic kidney disease, as well as history of ischemic colitis in 2013. She states she started having nausea and vomiting on Tuesday for around 24 hours then she developed some abdominal pain and diarrhea. She states she did pass some blood clots. She denies any shortness of breath or chest pain. She was walking around in her room today at time of examination and says that she feels slightly better than when she came in. She says her abdominal pain has improved. Her last colonoscopy was around 2013 when she was previously diagnosed with ischemic colitis. She states that it was normal per her recollection. She is hemodynamically stable and afebrile. She denies any aversion to food intake and denies any increased pain with p.o. intake.       Past Medical History:   Diagnosis Date    Allergic rhinitis     Chronic pain syndrome     follows with [facility], Dr. Deonte Guerra    COPD (chronic obstructive pulmonary disease) [facility])     follows with Pulmonology, Dr. Rebeca Stahl    GERD (gastroesophageal reflux disease)     Grade I diastolic dysfunction 9683    Hernia, hiatal 12/14/2017    Hyperlipidemia     Hypertension     Hypothyroidism 04/07/2017    Irritable bowel     Ischemic colitis ([facility]     Mild tricuspid regurgitation 2013    Osteopenia of multiple sites 05/02/2022    major osteoporotic fracture risk is 20.7%, risk of hip fracture is 0.4%    Overactive bladder     follows with Urology, Dr. Se Ma    Pneumonia     Prediabetes 03/24/2022    Recurrent major depressive disorder ([facility] 12/14/2017    follows with Psychiatry, Dr. Jacqueline Cavazos    Restless legs syndrome     Stage 3a chronic kidney disease ([facility]     Vitamin D deficiency Past Surgical History:   Procedure Laterality Date    APPENDECTOMY N/A     CARDIOVASCULAR STRESS TEST N/A 02/2006    Dr. Nile Brambila; EF=70%    CHOLECYSTECTOMY N/A     COLONOSCOPY N/A 02/20/2013    Dr. Antwon Yanez; ischemic colitis involving the descending and sigmoid colon, diverticulosis of the sigmoid colon    COLONOSCOPY N/A 05/12/2008    Dr. Jeff Little; internal hemorrhoids and spastic colon    ECHO COMPLETE N/A 02/19/2013    Dr. Alfred Marte; EF >78%-GYCNL I diastolic dysfunction, with mild TR and mild pulm HTN    TONSILLECTOMY Bilateral     UPPER GASTROINTESTINAL ENDOSCOPY N/A 02/19/2013    Dr. Antwon Yanez; gastritis    UPPER GASTROINTESTINAL ENDOSCOPY N/A 11/15/2011       Medications Prior to Admission:    Prior to Admission medications    Medication Sig Start Date End Date Taking?  Authorizing Provider   alendronate (FOSAMAX) 70 MG tablet Take 70 mg by mouth every 7 days sundays   Yes Historical Provider, MD   Coenzyme Q10 (CO Q-10) 100 MG CAPS Take by mouth daily   Yes Historical Provider, MD   fluticasone-umeclidin-vilant (TRELEGY ELLIPTA) 194-02.3-17 MCG/ACT AEPB inhaler Inhale 1 puff into the lungs daily 1/10/23   Beth Campos,    dilTIAZem (CARDIZEM CD) 120 MG extended release capsule Take 1 capsule by mouth daily 11/18/22   Frantz Fraga MD   levothyroxine (SYNTHROID) 75 MCG tablet TAKE ONE TABLET BY MOUTH IN THE MORNING 10/31/22   [facility] DO Iain   Cholecalciferol (VITAMIN D3) 1.25 MG (43680 UT) CAPS Take 1 capsule by mouth once a week 9/14/22   [facility] DO Iain   montelukast (SINGULAIR) 10 MG tablet Take 1 tablet by mouth nightly 9/14/22   [facility] DO Iain   tiZANidine (ZANAFLEX) 2 MG tablet TAKE ONE TABLET BY MOUTH EVERY 12 HOURS AS NEEDED FOR MUSCLE SPASM OF THE NECK FOR 30 DAYS 9/9/22   Historical Provider, MD   benazepril-hydrochlorthiazide (LOTENSIN HCT) 20-12.5 MG per tablet TAKE ONE TABLET BY MOUTH DAILY 8/25/22   [facility] DO Iain   rOPINIRole (REQUIP) 1 MG tablet TAKE ONE TABLET BY MOUTH NIGHTLY 8/8/22 1/21/23  Fiorella Timmons, DO   PROVENTIL  (90 Base) MCG/ACT inhaler Inhale 2 puffs into the lungs every 6 hours as needed for Wheezing (wheezing) Wants the proventil. Will pay out of pocket. 8/8/22   Adriane Campos, DO   oxyCODONE-acetaminophen (PERCOCET)  MG per tablet Take 1 tablet by mouth every 8 hours as needed. 5/16/22   Historical Provider, MD   Citalopram Hydrobromide 30 MG CAPS Take 30 mg by mouth daily    Historical Provider, MD   traZODone (DESYREL) 100 MG tablet Take 100 mg by mouth every evening 1/27/21   Historical Provider, MD   fluticasone (FLONASE) 50 MCG/ACT nasal spray 2 sprays by Nasal route daily 6/6/18   Chi Gonzales MD   TL STANLEY RX 2.2-25-1 MG TABS tablet Take 1 tablet by mouth daily 4/18/18   Historical Provider, MD   lidocaine (LIDODERM) 5 % Place 1 patch onto the skin 2 times daily 12 hours on, 12 hours off. Patient not taking: Reported on 1/21/2023 4/11/17   Chi Gonzales MD   omeprazole (PRILOSEC) 40 MG capsule Take 1 capsule by mouth daily. 2/23/13   Chi Gonzales MD   LORazepam (ATIVAN) 1 MG tablet Take 1 mg by mouth in the morning and at bedtime.      Historical Provider, MD       Allergies   Allergen Reactions    Celebrex [Celecoxib]     Simvastatin Myalgia    Avelox [Moxifloxacin Hcl In Nacl]     Biaxin [Clarithromycin]     Clarithromycin     Desvenlafaxine     Lipitor [Atorvastatin] Myalgia    Lyrica [Pregabalin]     Tetracyclines & Related     Zithromax [Azithromycin]     Sulfa Antibiotics Rash    Vioxx [Rofecoxib] Nausea And Vomiting       Family History   Problem Relation Age of Onset    High Blood Pressure Mother     Thyroid Disease Mother     Hypertension Mother     Lung Cancer Father         with metastasis to his brain    Heart Disease Brother     Pacemaker Brother     COPD Brother     Breast Cancer Paternal Aunt     Breast Cancer Paternal Aunt     Obesity Maternal Grandmother     Heart Disease Paternal Grandmother        Social History     Tobacco Use    Smoking status: Former     Packs/day: 1.00     Years: 45.00     Pack years: 45.00     Types: Cigarettes     Start date: 0     Quit date: 2020     Years since quitting: 3.0    Smokeless tobacco: Never   Vaping Use    Vaping Use: Former    Substances: Nicotine   Substance Use Topics    Alcohol use: No    Drug use: Never         General ROS: negative for - chills, fatigue or malaise  ENT ROS: negative for - hearing change, nasal congestion or nasal discharge  Allergy and Immunology ROS: negative for - hives, itchy/watery eyes or nasal congestion  Hematological and Lymphatic ROS: negative for - blood clots, blood transfusions, bruising or fatigue  Endocrine ROS: negative for - malaise/lethargy, mood swings, palpitations or polydipsia/polyuria  Respiratory ROS: negative for - sputum changes, stridor, tachypnea or wheezing  Cardiovascular ROS: negative for - irregular heartbeat, loss of consciousness, murmur or orthopnea  Gastrointestinal ROS: negative for - constipation, gas/bloating, heartburn or hematemesis, + diarrhea and abdominal pain  Genito-Urinary ROS: negative for -  genital discharge, genital ulcers or hematuria  Musculoskeletal ROS: negative for - gait disturbance, muscle pain or muscular weakness        PHYSICAL EXAM:    Vitals:    01/21/23 0724   BP: (!) 129/57   Pulse: 72   Resp: 17   Temp: 98.8 °F (37.1 °C)   SpO2: 98%       Gen: aox3, nad  Heent: nc/at  Neck: trachea midline, no LAD  Cvs: rrr  Lungs: non labored  Abd: soft, minimal lower abd ttp without rebound or guarding, no peritoneal signs, non distended  Neuro: no focal deficits      LABS:    CBC  Recent Labs     01/21/23  0905   WBC 9.6   HGB 13.3   HCT 40.4        BMP  Recent Labs     01/21/23  0905      K 3.5   CL 97*   CO2 24   BUN 10   CREATININE 1.2*   CALCIUM 8.4*     Liver Function  Recent Labs     01/20/23 2022 01/21/23  0905   LIPASE 29  --    BILITOT 0.4 0.7   AST 25 26   ALT 18 18   ALKPHOS 69 72   PROT 7.1 6.8   LABALBU 4.2 3.9     No results for input(s): LACTATE in the last 72 hours. No results for input(s): INR, PTT in the last 72 hours. Invalid input(s): PT    RADIOLOGY    CT ABDOMEN PELVIS W IV CONTRAST Additional Contrast? None    Result Date: 1/20/2023    Minimal pericolic edema along the proximal descending colon may be due to COLITIS OR ACUTE, UNCOMPLICATED DIVERTICULITIS. ASSESSMENT:  75 y.o. female with abd pain, bloody bm and history of ischemic colitis    PLAN:  - Labs and imaging reviewed, mild inflammation along the left side of the colon with no evidence of free air or pneumatosis or evidence of advanced ischemic bowel based on CT scan. She has had some bloody bowel movements which could be signs of ischemic colitis but are rather nonspecific.   - Recommend IV fluid resuscitation  - Recommend broad-spectrum antibiotics as I also recommended last night to the emergency room when they called. - Continue supportive care at this time   - I think that she will benefit from a colonoscopy at some point in the near future. More than likely as an outpatient if she continues to improve. - We will be available to perform inpatient endoscopy if her presentation necessitates that while here. - We will continue to monitor her clinical exam as well as labs. Sharen Rubinstein MD FACS  [facility] Endoscopy  [address] [place] [address].  [address]: [phone]  F: [phone]      Electronically signed by Omayra Coon MD on 1/21/23 at 9:59 AM EST

## 2023-01-21 NOTE — ED PROVIDER NOTES
[facility]        Pt Name: Gerald Conklin  MRN: [id number]  [place] 1955  Date of evaluation: 1/20/2023  Provider: Radha Gasca DO  PCP: Vini Hoyt DO  Note Started: 8:35 PM EST 1/20/23    CHIEF COMPLAINT       Chief Complaint   Patient presents with    Abdominal Pain     Abd pain for last few days. NVD, bloody stool    Diarrhea    Nausea    Emesis       HISTORY OF PRESENT ILLNESS: 1 or more Elements     History from : Patient    Limitations to history : None    Gerald Conklin is a 75 y.o. female who has history of copd, previous smoker , ckd 3a ischemic colitis presents for vomiting that started Tuesday that ended wed afternoon, then developed abdominal pain diarrhea and bright red blood per rectum. Pt notes vomiting resolved but has had persistent abdominal pain and gets nauseated easily. Pain is localized to let side. Pt saw pcp today ad was sent today. Pain is 9/10. Pt notes is on percocet for chronic pain. Pt notes 2 tiny bowl movements today. Nursing Notes were all reviewed and agreed with or any disagreements were addressed in the HPI. REVIEW OF SYSTEMS :      Review of Systems   Constitutional:  Positive for fatigue. Negative for chills and fever. HENT:  Negative for congestion, postnasal drip and rhinorrhea. Eyes:  Negative for photophobia and visual disturbance. Respiratory:  Negative for cough and shortness of breath. Cardiovascular:  Negative for chest pain and palpitations. Gastrointestinal:  Positive for abdominal pain, diarrhea, nausea and vomiting. Endocrine: Negative for polyphagia and polyuria. Genitourinary:  Negative for difficulty urinating, dyspareunia and dysuria. Musculoskeletal:  Negative for back pain and neck pain. Skin:  Negative for rash. Allergic/Immunologic: Negative for food allergies and immunocompromised state. Neurological:  Negative for dizziness.    Hematological:  Negative for adenopathy. Does not bruise/bleed easily. Psychiatric/Behavioral:  Negative for agitation. The patient is not nervous/anxious. Positives and Pertinent negatives as per HPI. SURGICAL HISTORY     Past Surgical History:   Procedure Laterality Date    APPENDECTOMY N/A     CARDIOVASCULAR STRESS TEST N/A 02/2006    Dr. Penelope Romero; EF=70%    CHOLECYSTECTOMY N/A     COLONOSCOPY N/A 02/20/2013    Dr. Sylvia Capellan; ischemic colitis involving the descending and sigmoid colon, diverticulosis of the sigmoid colon    COLONOSCOPY N/A 05/12/2008    Dr. Jason Crump; internal hemorrhoids and spastic colon    ECHO COMPLETE N/A 02/19/2013    Dr. Max Condon; EF >74%-POEPN I diastolic dysfunction, with mild TR and mild pulm HTN    TONSILLECTOMY Bilateral     UPPER GASTROINTESTINAL ENDOSCOPY N/A 02/19/2013    Dr. Sylvia Capellan; gastritis    UPPER GASTROINTESTINAL ENDOSCOPY N/A 11/15/2011       CURRENTMEDICATIONS       Current Discharge Medication List        CONTINUE these medications which have NOT CHANGED    Details   alendronate (FOSAMAX) 70 MG tablet Take 70 mg by mouth every 7 days sundays      Coenzyme Q10 (CO Q-10) 100 MG CAPS Take by mouth daily      fluticasone-umeclidin-vilant (TRELEGY ELLIPTA) 100-62.5-25 MCG/ACT AEPB inhaler Inhale 1 puff into the lungs daily  Qty: 1 each, Refills: 5    Associated Diagnoses: Simple chronic bronchitis ([address]  Pulmonary HTN (HCC)      dilTIAZem (CARDIZEM CD) 120 MG extended release capsule Take 1 capsule by mouth daily  Qty: 30 capsule, Refills: 11      levothyroxine (SYNTHROID) 75 MCG tablet TAKE ONE TABLET BY MOUTH IN THE MORNING  Qty: 90 tablet, Refills: 0    Associated Diagnoses: Acquired hypothyroidism      Cholecalciferol (VITAMIN D3) 1.25 MG (91318 UT) CAPS Take 1 capsule by mouth once a week  Qty: 12 capsule, Refills: 1    Associated Diagnoses: Vitamin D deficiency      montelukast (SINGULAIR) 10 MG tablet Take 1 tablet by mouth nightly  Qty: 90 tablet, Refills: 1    Associated Diagnoses: Panlobular emphysema ([facility]      tiZANidine (ZANAFLEX) 2 MG tablet TAKE ONE TABLET BY MOUTH EVERY 12 HOURS AS NEEDED FOR MUSCLE SPASM OF THE NECK FOR 30 DAYS      benazepril-hydrochlorthiazide (LOTENSIN HCT) 20-12.5 MG per tablet TAKE ONE TABLET BY MOUTH DAILY  Qty: 90 tablet, Refills: 1    Associated Diagnoses: Essential hypertension      rOPINIRole (REQUIP) 1 MG tablet TAKE ONE TABLET BY MOUTH NIGHTLY  Qty: 90 tablet, Refills: 1    Associated Diagnoses: Restless leg syndrome      PROVENTIL  (90 Base) MCG/ACT inhaler Inhale 2 puffs into the lungs every 6 hours as needed for Wheezing (wheezing) Wants the proventil. Will pay out of pocket. Qty: 1 each, Refills: 3    Associated Diagnoses: Panlobular emphysema ([facility] [address]      oxyCODONE-acetaminophen (PERCOCET)  MG per tablet Take 1 tablet by mouth every 8 hours as needed. Citalopram Hydrobromide 30 MG CAPS Take 30 mg by mouth daily      traZODone (DESYREL) 100 MG tablet Take 100 mg by mouth every evening      fluticasone (FLONASE) 50 MCG/ACT nasal spray 2 sprays by Nasal route daily  Qty: 1 Bottle, Refills: 3      TL STANLEY RX 2.2-25-1 MG TABS tablet Take 1 tablet by mouth daily  Refills: 1      lidocaine (LIDODERM) 5 % Place 1 patch onto the skin 2 times daily 12 hours on, 12 hours off. Qty: 60 patch, Refills: 5      omeprazole (PRILOSEC) 40 MG capsule Take 1 capsule by mouth daily. Qty: 30 capsule, Refills: 3      LORazepam (ATIVAN) 1 MG tablet Take 1 mg by mouth in the morning and at bedtime.               ALLERGIES     Celebrex [celecoxib], Simvastatin, Avelox [moxifloxacin hcl in nacl], Biaxin [clarithromycin], Clarithromycin, Desvenlafaxine, Lipitor [atorvastatin], Lyrica [pregabalin], Tetracyclines & related, Zithromax [azithromycin], Sulfa antibiotics, and Vioxx [rofecoxib]    FAMILYHISTORY       Family History   Problem Relation Age of Onset    High Blood Pressure Mother     Thyroid Disease Mother     Hypertension Mother     Lung Cancer Father         with metastasis to his brain    Heart Disease Brother     Pacemaker Brother     COPD Brother     Breast Cancer Paternal Aunt     Breast Cancer Paternal Aunt     Obesity Maternal Grandmother     Heart Disease Paternal Grandmother         SOCIAL HISTORY       Social History     Tobacco Use    Smoking status: Former     Packs/day: 1.00     Years: 45.00     Pack years: 45.00     Types: Cigarettes     Start date: 0     Quit date: 2020     Years since quitting: 3.0    Smokeless tobacco: Never   Vaping Use    Vaping Use: Former    Substances: Nicotine   Substance Use Topics    Alcohol use: No    Drug use: Never       SCREENINGS        Farida Coma Scale  Eye Opening: Spontaneous  Best Verbal Response: Oriented  Best Motor Response: Obeys commands  Farida Coma Scale Score: 15                CIWA Assessment  BP: (!) 126/59  Heart Rate: 65           PHYSICAL EXAM  1 or more Elements     ED Triage Vitals [01/20/23 1906]   BP Temp Temp Source Heart Rate Resp SpO2 Height Weight   135/64 97.9 °F (36.6 °C) Temporal 79 18 97 % 5' 1\" (1.549 m) 171 lb (77.6 kg)       Physical Exam  Vitals reviewed. Constitutional:       General: She is not in acute distress. Appearance: Normal appearance. She is not toxic-appearing. HENT:      Head: Normocephalic and atraumatic. Right Ear: External ear normal.      Left Ear: External ear normal.      Nose: Nose normal. No congestion. Mouth/Throat:      Mouth: Mucous membranes are moist.      Pharynx: Oropharynx is clear. No posterior oropharyngeal erythema. Eyes:      Extraocular Movements: Extraocular movements intact. Pupils: Pupils are equal, round, and reactive to light. Cardiovascular:      Rate and Rhythm: Normal rate and regular rhythm. Pulses: Normal pulses. Heart sounds: No murmur heard. Pulmonary:      Effort: Pulmonary effort is normal.      Breath sounds: No wheezing or rhonchi. Chest:      Chest wall: No tenderness.    Abdominal:      General: Bowel sounds are normal.      Tenderness: There is generalized abdominal tenderness and tenderness in the left upper quadrant. There is no right CVA tenderness, left CVA tenderness or guarding. Musculoskeletal:         General: No swelling or deformity. Cervical back: Normal range of motion and neck supple. No muscular tenderness. Skin:     General: Skin is warm and dry. Capillary Refill: Capillary refill takes less than 2 seconds. Neurological:      General: No focal deficit present. Mental Status: She is alert and oriented to person, place, and time.    Psychiatric:         Mood and Affect: Mood normal.           DIAGNOSTIC RESULTS   LABS:    Labs Reviewed   CBC WITH AUTO DIFFERENTIAL - Abnormal; Notable for the following components:       Result Value    WBC 13.4 (*)     Neutrophils Absolute 9.26 (*)     Monocytes Absolute 1.07 (*)     All other components within normal limits   COMPREHENSIVE METABOLIC PANEL W/ REFLEX TO MG FOR LOW K - Abnormal; Notable for the following components:    Potassium reflex Magnesium 3.3 (*)     Chloride 94 (*)     Creatinine 1.3 (*)     All other components within normal limits   URINALYSIS WITH MICROSCOPIC - Abnormal; Notable for the following components:    Ketones, Urine 15 (*)     All other components within normal limits   CULTURE, STOOL   CLOSTRIDIUM DIFFICILE EIA   LIPASE   LACTIC ACID   MAGNESIUM   COMPREHENSIVE METABOLIC PANEL W/ REFLEX TO MG FOR LOW K   CBC WITH AUTO DIFFERENTIAL     Results for orders placed or performed during the hospital encounter of 01/20/23   CBC with Auto Differential   Result Value Ref Range    WBC 13.4 (H) 4.5 - 11.5 E9/L    RBC 4.43 3.50 - 5.50 E12/L    Hemoglobin 13.0 11.5 - 15.5 g/dL    Hematocrit 38.5 34.0 - 48.0 %    MCV 86.9 80.0 - 99.9 fL    MCH 29.3 26.0 - 35.0 pg    MCHC 33.8 32.0 - 34.5 %    RDW 12.5 11.5 - 15.0 fL    Platelets 879 743 - 636 E9/L    MPV 9.4 7.0 - 12.0 fL    Neutrophils % 69.1 43.0 - 80.0 %    Immature Granulocytes % 0.4 0.0 - 5.0 %    Lymphocytes % 21.5 20.0 - 42.0 %    Monocytes % 8.0 2.0 - 12.0 %    Eosinophils % 0.4 0.0 - 6.0 %    Basophils % 0.6 0.0 - 2.0 %    Neutrophils Absolute 9.26 (H) 1.80 - 7.30 E9/L    Immature Granulocytes # 0.06 E9/L    Lymphocytes Absolute 2.88 1.50 - 4.00 E9/L    Monocytes Absolute 1.07 (H) 0.10 - 0.95 E9/L    Eosinophils Absolute 0.05 0.05 - 0.50 E9/L    Basophils Absolute 0.08 0.00 - 0.20 E9/L   Comprehensive Metabolic Panel w/ Reflex to MG   Result Value Ref Range    Sodium 134 132 - 146 mmol/L    Potassium reflex Magnesium 3.3 (L) 3.5 - 5.0 mmol/L    Chloride 94 (L) 98 - 107 mmol/L    CO2 28 22 - 29 mmol/L    Anion Gap 12 7 - 16 mmol/L    Glucose 95 74 - 99 mg/dL    BUN 13 6 - 23 mg/dL    Creatinine 1.3 (H) 0.5 - 1.0 mg/dL    Est, Glom Filt Rate 45 >=60 mL/min/1.73    Calcium 8.9 8.6 - 10.2 mg/dL    Total Protein 7.1 6.4 - 8.3 g/dL    Albumin 4.2 3.5 - 5.2 g/dL    Total Bilirubin 0.4 0.0 - 1.2 mg/dL    Alkaline Phosphatase 69 35 - 104 U/L    ALT 18 0 - 32 U/L    AST 25 0 - 31 U/L   Lipase   Result Value Ref Range    Lipase 29 13 - 60 U/L   Lactic Acid   Result Value Ref Range    Lactic Acid 1.1 0.5 - 2.2 mmol/L   Urinalysis with Microscopic   Result Value Ref Range    Color, UA Yellow Straw/Yellow    Clarity, UA Clear Clear    Glucose, Ur Negative Negative mg/dL    Bilirubin Urine Negative Negative    Ketones, Urine 15 (A) Negative mg/dL    Specific Gravity, UA <=1.005 1.005 - 1.030    Blood, Urine Negative Negative    pH, UA 6.0 5.0 - 9.0    Protein, UA Negative Negative mg/dL    Urobilinogen, Urine 0.2 <2.0 E.U./dL    Nitrite, Urine Negative Negative    Leukocyte Esterase, Urine Negative Negative    WBC, UA NONE 0 - 5 /HPF    RBC, UA NONE 0 - 2 /HPF    Bacteria, UA NONE SEEN None Seen /HPF   Magnesium   Result Value Ref Range    Magnesium 1.8 1.6 - 2.6 mg/dL   EKG 12 Lead   Result Value Ref Range    Ventricular Rate 74 BPM    Atrial Rate 74 BPM    P-R Interval 164 ms    QRS Duration 76 ms Q-T Interval 404 ms    QTc Calculation (Bazett) 448 ms    P Axis 75 degrees    R Axis 57 degrees    T Axis 63 degrees       When ordered only abnormal lab results are displayed. All other labs were within normal range or not returned as of this dictation. [id number] 20:19:10   Normal sinus rhythm  Hr 74  Normal axis and intervals and qtc, no ischemic  Stable ecg    RADIOLOGY:   Non-plain film images such as CT, Ultrasound and MRI are read by the radiologist. Plain radiographic images are visualized and preliminarily interpreted by the ED Provider with the below findings:    Ct showing colitis    Interpretation per the Radiologist below, if available at the time of this note:    CT ABDOMEN PELVIS W IV CONTRAST Additional Contrast? None   Final Result   Minimal pericolic edema along the proximal descending colon may be due to   COLITIS OR ACUTE, UNCOMPLICATED DIVERTICULITIS. No results found. No results found. PROCEDURES   Unless otherwise noted below, none     Procedures    CRITICAL CARE TIME   none    PAST MEDICAL HISTORY      has a past medical history of Allergic rhinitis, Chronic pain syndrome, COPD (chronic obstructive pulmonary disease) ([address] GERD (gastroesophageal reflux disease), Grade I diastolic dysfunction (7490), Hernia, hiatal (2017), Hyperlipidemia, Hypertension, Hypothyroidism (2017), Irritable bowel, Ischemic colitis ([address] Mild tricuspid regurgitation (), Osteopenia of multiple sites (2022), Overactive bladder, Pneumonia, Prediabetes (2022), Recurrent major depressive disorder ([address] (2017), Restless legs syndrome, Stage 3a chronic kidney disease ([address] and Vitamin D deficiency.      EMERGENCY DEPARTMENT COURSE and DIFFERENTIAL DIAGNOSIS/MDM:   Vitals:    Vitals:    23 0146 23 0213 23 0221 23 0543   BP: 138/69 (!) 126/59     Pulse: 75 80 65    Resp: 18 18 16 16   Temp: 98.6 °F (37 °C) 98 °F (36.7 °C)     TempSrc: Oral     SpO2: 97% 96% 95%    Weight:       Height:           Patient was given the following medications:  Medications   Vitamin D3 CAPS 50,000 Units (has no administration in time range)   citalopram (CELEXA) tablet 30 mg (has no administration in time range)   dilTIAZem (CARDIZEM CD) extended release capsule 120 mg (has no administration in time range)   fluticasone (FLONASE) 50 MCG/ACT nasal spray 2 spray (has no administration in time range)   levothyroxine (SYNTHROID) tablet 75 mcg (75 mcg Oral Not Given 1/21/23 0500)   LORazepam (ATIVAN) tablet 1 mg (1 mg Oral Given 1/21/23 0245)   montelukast (SINGULAIR) tablet 10 mg (has no administration in time range)   0.9 % sodium chloride infusion ( IntraVENous New Bag 1/21/23 0245)   morphine (PF) injection 2 mg (2 mg IntraVENous Given 1/21/23 0513)   sodium chloride flush 0.9 % injection 5-40 mL (has no administration in time range)   sodium chloride flush 0.9 % injection 5-40 mL (has no administration in time range)   0.9 % sodium chloride infusion (has no administration in time range)   ondansetron (ZOFRAN-ODT) disintegrating tablet 4 mg (has no administration in time range)     Or   ondansetron (ZOFRAN) injection 4 mg (has no administration in time range)   polyethylene glycol (GLYCOLAX) packet 17 g (has no administration in time range)   acetaminophen (TYLENOL) tablet 650 mg (has no administration in time range)     Or   acetaminophen (TYLENOL) suppository 650 mg (has no administration in time range)   potassium chloride (KLOR-CON M) extended release tablet 40 mEq (has no administration in time range)     Or   potassium bicarb-citric acid (EFFER-K) effervescent tablet 40 mEq (has no administration in time range)     Or   potassium chloride 10 mEq/100 mL IVPB (Peripheral Line) (has no administration in time range)   budesonide (PULMICORT) nebulizer suspension 250 mcg (250 mcg Nebulization Given 1/21/23 0220)   ipratropium (ATROVENT) 0.02 % nebulizer solution 0.5 mg (has no administration in time range)   arformoterol tartrate (BROVANA) nebulizer solution 15 mcg (has no administration in time range)   albuterol (PROVENTIL) nebulizer solution 2.5 mg (has no administration in time range)   0.9 % sodium chloride infusion ( IntraVENous Stopped 1/20/23 2200)   pantoprazole (PROTONIX) injection 40 mg (40 mg IntraVENous Given 1/20/23 2043)   ondansetron (ZOFRAN) injection 4 mg (4 mg IntraVENous Given 1/20/23 2042)   morphine sulfate (PF) injection 4 mg (4 mg IntraVENous Given 1/20/23 2047)   potassium chloride 10 mEq/100 mL IVPB (Peripheral Line) (0 mEq IntraVENous Stopped 1/21/23 0028)   iopamidol (ISOVUE-370) 76 % injection 75 mL (75 mLs IntraVENous Given 1/20/23 2215)   piperacillin-tazobactam (ZOSYN) 4,500 mg in dextrose 5 % 100 mL IVPB (vial-mate) (0 mg IntraVENous Stopped 1/21/23 0235)       ED Course as of 01/21/23 0718 Fri Jan 20, 2023 2101 D/w dr Yan Chong   who notes pt has history of ischemic colitis wanting updated on results [DELVIN]   2342 D/w general surgery dr Suni Longoria notes to hold off on abx for now and to have medicine admit he will see pt [DELVIN]   2343 D/w hospitalist michelle thao will admit [DELVIN]      ED Course User Index  [DELVIN] Nubia Mcginnis DO        [unfilled]    Chronic Conditions:    Active Ambulatory Problems     Diagnosis Date Noted    Hyperlipidemia 04/07/2017    Hypothyroidism 04/07/2017    Essential hypertension 04/07/2017    COPD (chronic obstructive pulmonary disease) ([facility] [address] 04/07/2017    Chronic seasonal allergic rhinitis due to fungal spores 12/14/2017    Chronic pain syndrome 12/14/2017    Hernia, hiatal 12/14/2017    Irritable bowel syndrome with both constipation and diarrhea 12/14/2017    Recurrent major depressive disorder ([address] 12/14/2017    Allergic rhinitis     GERD (gastroesophageal reflux disease)     Mild tricuspid regurgitation 2013    Pulmonary HTN ([facility] [address] 2013    Grade I diastolic dysfunction 4217    Restless legs syndrome 02/22/2022    Overactive bladder 02/22/2022    Vitamin D deficiency 02/22/2022    Osteopenia of multiple sites 03/24/2022    Stage 3a chronic kidney disease ([facility] [address] 03/25/2022    Prediabetes 03/24/2022     Resolved Ambulatory Problems     Diagnosis Date Noted    Tobacco use disorder 12/14/2017    Ischemic colitis ([facility] [address]     Pneumonia 02/22/2022     Past Medical History:   Diagnosis Date    Hypertension     Irritable bowel          CONSULTS: (Who and What was discussed)  IP CONSULT TO GENERAL SURGERY  IP CONSULT TO IV TEAM    Discussion with Other Profesionals : Admitting Team dr Lisa Mcgowan and Consultant dr Devon Warner general surgery and PCP dr Ez Herman    Social Determinants : None    Records Reviewed : Other previous surgery notes and pcp note from today    CC/HPI Summary, DDx, ED Course, and Reassessment: 59-year-old female with multiple medical problems. She has a history of COPD hypothyroid hyperlipidemia hypertension history of previous long-term smoker. History of ischemic colitis in 2013. She presented for abdominal pain and bloody diarrhea. Differential diagnosis includes ischemic colitis, infectious colitis, diverticulitis, hemorrhoidal bleeding, diverticulosis bleeding to name a few. Her hemoglobin is stable. White blood cell count is 13 and elevated consistent with her colitis. All her electrolytes are stable kidney function is stable. Her liver enzymes are stable platelet count is stable. Case was reviewed with general surgery who recommended medical admission and will see and evaluate. He did want a dose of IV Zosyn. Patient in stable condition feeling better will be admitted to surgical floor. Disposition Considerations (tests considered but not done, Shared Decision Making, Pt Expectation of Test or Tx.): admitted  admitted      I am the Primary Clinician of Record. FINAL IMPRESSION      1. Colitis    2.  GALLEGOS (dyspnea on exertion)          DISPOSITION/PLAN     DISPOSITION Admitted 01/20/2023 11:58:11 PM      PATIENT REFERRED TO:  No follow-up provider specified.     DISCHARGE MEDICATIONS:  Current Discharge Medication List          DISCONTINUED MEDICATIONS:  Current Discharge Medication List                 (Please note that portions of this note were completed with a voice recognition program.  Efforts were made to edit the dictations but occasionally words are mis-transcribed.)    Los Thomas DO (electronically signed)           Los Thomas DO  01/21/23 0723

## 2023-01-22 LAB
ALBUMIN SERPL-MCNC: 3.4 G/DL (ref 3.5–5.2)
ALP BLD-CCNC: 56 U/L (ref 35–104)
ALT SERPL-CCNC: 17 U/L (ref 0–32)
ANION GAP SERPL CALCULATED.3IONS-SCNC: 8 MMOL/L (ref 7–16)
AST SERPL-CCNC: 28 U/L (ref 0–31)
BASOPHILS ABSOLUTE: 0.04 E9/L (ref 0–0.2)
BASOPHILS RELATIVE PERCENT: 0.7 % (ref 0–2)
BILIRUB SERPL-MCNC: 0.3 MG/DL (ref 0–1.2)
BUN BLDV-MCNC: 7 MG/DL (ref 6–23)
CALCIUM SERPL-MCNC: 7.8 MG/DL (ref 8.6–10.2)
CHLORIDE BLD-SCNC: 104 MMOL/L (ref 98–107)
CO2: 25 MMOL/L (ref 22–29)
CREAT SERPL-MCNC: 1.1 MG/DL (ref 0.5–1)
EOSINOPHILS ABSOLUTE: 0.11 E9/L (ref 0.05–0.5)
EOSINOPHILS RELATIVE PERCENT: 1.8 % (ref 0–6)
GFR SERPL CREATININE-BSD FRML MDRD: 54 ML/MIN/1.73
GLUCOSE BLD-MCNC: 137 MG/DL (ref 74–99)
HCT VFR BLD CALC: 34.3 % (ref 34–48)
HEMOGLOBIN: 11.1 G/DL (ref 11.5–15.5)
IMMATURE GRANULOCYTES #: 0.06 E9/L
IMMATURE GRANULOCYTES %: 1 % (ref 0–5)
LYMPHOCYTES ABSOLUTE: 1.6 E9/L (ref 1.5–4)
LYMPHOCYTES RELATIVE PERCENT: 26.3 % (ref 20–42)
MCH RBC QN AUTO: 29.1 PG (ref 26–35)
MCHC RBC AUTO-ENTMCNC: 32.4 % (ref 32–34.5)
MCV RBC AUTO: 89.8 FL (ref 80–99.9)
MONOCYTES ABSOLUTE: 0.49 E9/L (ref 0.1–0.95)
MONOCYTES RELATIVE PERCENT: 8 % (ref 2–12)
NEUTROPHILS ABSOLUTE: 3.79 E9/L (ref 1.8–7.3)
NEUTROPHILS RELATIVE PERCENT: 62.2 % (ref 43–80)
PDW BLD-RTO: 12.7 FL (ref 11.5–15)
PLATELET # BLD: 254 E9/L (ref 130–450)
PMV BLD AUTO: 9.7 FL (ref 7–12)
POTASSIUM REFLEX MAGNESIUM: 3.7 MMOL/L (ref 3.5–5)
RBC # BLD: 3.82 E12/L (ref 3.5–5.5)
SODIUM BLD-SCNC: 137 MMOL/L (ref 132–146)
TOTAL PROTEIN: 5.8 G/DL (ref 6.4–8.3)
WBC # BLD: 6.1 E9/L (ref 4.5–11.5)

## 2023-01-22 PROCEDURE — 85025 COMPLETE CBC W/AUTO DIFF WBC: CPT

## 2023-01-22 PROCEDURE — 6360000002 HC RX W HCPCS: Performed by: INTERNAL MEDICINE

## 2023-01-22 PROCEDURE — 2580000003 HC RX 258: Performed by: STUDENT IN AN ORGANIZED HEALTH CARE EDUCATION/TRAINING PROGRAM

## 2023-01-22 PROCEDURE — 99231 SBSQ HOSP IP/OBS SF/LOW 25: CPT | Performed by: INTERNAL MEDICINE

## 2023-01-22 PROCEDURE — 87449 NOS EACH ORGANISM AG IA: CPT

## 2023-01-22 PROCEDURE — 87045 FECES CULTURE AEROBIC BACT: CPT

## 2023-01-22 PROCEDURE — 6370000000 HC RX 637 (ALT 250 FOR IP): Performed by: INTERNAL MEDICINE

## 2023-01-22 PROCEDURE — 87324 CLOSTRIDIUM AG IA: CPT

## 2023-01-22 PROCEDURE — 6360000002 HC RX W HCPCS: Performed by: STUDENT IN AN ORGANIZED HEALTH CARE EDUCATION/TRAINING PROGRAM

## 2023-01-22 PROCEDURE — 94640 AIRWAY INHALATION TREATMENT: CPT

## 2023-01-22 PROCEDURE — 36415 COLL VENOUS BLD VENIPUNCTURE: CPT

## 2023-01-22 PROCEDURE — 80053 COMPREHEN METABOLIC PANEL: CPT

## 2023-01-22 PROCEDURE — 2580000003 HC RX 258: Performed by: INTERNAL MEDICINE

## 2023-01-22 PROCEDURE — 1200000000 HC SEMI PRIVATE

## 2023-01-22 PROCEDURE — 6370000000 HC RX 637 (ALT 250 FOR IP): Performed by: STUDENT IN AN ORGANIZED HEALTH CARE EDUCATION/TRAINING PROGRAM

## 2023-01-22 RX ADMIN — ARFORMOTEROL TARTRATE 15 MCG: 15 SOLUTION RESPIRATORY (INHALATION) at 08:02

## 2023-01-22 RX ADMIN — DILTIAZEM HYDROCHLORIDE 120 MG: 120 CAPSULE, COATED, EXTENDED RELEASE ORAL at 08:10

## 2023-01-22 RX ADMIN — PIPERACILLIN AND TAZOBACTAM 3375 MG: 3; .375 INJECTION, POWDER, FOR SOLUTION INTRAVENOUS at 21:47

## 2023-01-22 RX ADMIN — PIPERACILLIN AND TAZOBACTAM 3375 MG: 3; .375 INJECTION, POWDER, FOR SOLUTION INTRAVENOUS at 05:44

## 2023-01-22 RX ADMIN — SODIUM CHLORIDE: 9 INJECTION, SOLUTION INTRAVENOUS at 01:58

## 2023-01-22 RX ADMIN — ARFORMOTEROL TARTRATE 15 MCG: 15 SOLUTION RESPIRATORY (INHALATION) at 19:43

## 2023-01-22 RX ADMIN — ROPINIROLE HYDROCHLORIDE 1 MG: 1 TABLET, FILM COATED ORAL at 20:00

## 2023-01-22 RX ADMIN — IPRATROPIUM BROMIDE 0.5 MG: 0.5 SOLUTION RESPIRATORY (INHALATION) at 12:05

## 2023-01-22 RX ADMIN — FLUTICASONE PROPIONATE 2 SPRAY: 50 SPRAY, METERED NASAL at 08:11

## 2023-01-22 RX ADMIN — HYDROMORPHONE HYDROCHLORIDE 1 MG: 1 INJECTION, SOLUTION INTRAMUSCULAR; INTRAVENOUS; SUBCUTANEOUS at 12:12

## 2023-01-22 RX ADMIN — CITALOPRAM HYDROBROMIDE 30 MG: 20 TABLET ORAL at 08:11

## 2023-01-22 RX ADMIN — IPRATROPIUM BROMIDE 0.5 MG: 0.5 SOLUTION RESPIRATORY (INHALATION) at 15:59

## 2023-01-22 RX ADMIN — BUDESONIDE 250 MCG: 0.25 SUSPENSION RESPIRATORY (INHALATION) at 19:43

## 2023-01-22 RX ADMIN — HYDROMORPHONE HYDROCHLORIDE 1 MG: 1 INJECTION, SOLUTION INTRAMUSCULAR; INTRAVENOUS; SUBCUTANEOUS at 08:10

## 2023-01-22 RX ADMIN — SODIUM CHLORIDE: 9 INJECTION, SOLUTION INTRAVENOUS at 21:51

## 2023-01-22 RX ADMIN — HYDROMORPHONE HYDROCHLORIDE 1 MG: 1 INJECTION, SOLUTION INTRAMUSCULAR; INTRAVENOUS; SUBCUTANEOUS at 03:55

## 2023-01-22 RX ADMIN — IPRATROPIUM BROMIDE 0.5 MG: 0.5 SOLUTION RESPIRATORY (INHALATION) at 19:43

## 2023-01-22 RX ADMIN — LEVOTHYROXINE SODIUM 75 MCG: 75 TABLET ORAL at 05:44

## 2023-01-22 RX ADMIN — SODIUM CHLORIDE: 9 INJECTION, SOLUTION INTRAVENOUS at 12:15

## 2023-01-22 RX ADMIN — HYDROMORPHONE HYDROCHLORIDE 1 MG: 1 INJECTION, SOLUTION INTRAMUSCULAR; INTRAVENOUS; SUBCUTANEOUS at 17:38

## 2023-01-22 RX ADMIN — MONTELUKAST SODIUM 10 MG: 10 TABLET ORAL at 20:00

## 2023-01-22 RX ADMIN — HYDROMORPHONE HYDROCHLORIDE 1 MG: 1 INJECTION, SOLUTION INTRAMUSCULAR; INTRAVENOUS; SUBCUTANEOUS at 21:47

## 2023-01-22 RX ADMIN — PIPERACILLIN AND TAZOBACTAM 3375 MG: 3; .375 INJECTION, POWDER, FOR SOLUTION INTRAVENOUS at 13:46

## 2023-01-22 RX ADMIN — IPRATROPIUM BROMIDE 0.5 MG: 0.5 SOLUTION RESPIRATORY (INHALATION) at 08:02

## 2023-01-22 RX ADMIN — BUDESONIDE 250 MCG: 0.25 SUSPENSION RESPIRATORY (INHALATION) at 08:02

## 2023-01-22 RX ADMIN — LORAZEPAM 1 MG: 1 TABLET ORAL at 20:00

## 2023-01-22 RX ADMIN — SODIUM CHLORIDE, PRESERVATIVE FREE 10 ML: 5 INJECTION INTRAVENOUS at 10:38

## 2023-01-22 RX ADMIN — LORAZEPAM 1 MG: 1 TABLET ORAL at 08:10

## 2023-01-22 ASSESSMENT — PAIN DESCRIPTION - ONSET
ONSET: ON-GOING

## 2023-01-22 ASSESSMENT — PAIN SCALES - GENERAL
PAINLEVEL_OUTOF10: 7
PAINLEVEL_OUTOF10: 6
PAINLEVEL_OUTOF10: 6
PAINLEVEL_OUTOF10: 2
PAINLEVEL_OUTOF10: 2
PAINLEVEL_OUTOF10: 6
PAINLEVEL_OUTOF10: 6
PAINLEVEL_OUTOF10: 0
PAINLEVEL_OUTOF10: 4
PAINLEVEL_OUTOF10: 3

## 2023-01-22 ASSESSMENT — PAIN DESCRIPTION - ORIENTATION
ORIENTATION: MID;LOWER
ORIENTATION: LEFT;LOWER
ORIENTATION: LOWER;MID
ORIENTATION: MID;LOWER
ORIENTATION: LEFT;LOWER

## 2023-01-22 ASSESSMENT — PAIN DESCRIPTION - LOCATION
LOCATION: ABDOMEN

## 2023-01-22 ASSESSMENT — PAIN DESCRIPTION - DESCRIPTORS
DESCRIPTORS: CRAMPING;DISCOMFORT
DESCRIPTORS: ACHING;DISCOMFORT;CRAMPING
DESCRIPTORS: ACHING;DISCOMFORT
DESCRIPTORS: ACHING;DISCOMFORT;SORE
DESCRIPTORS: DISCOMFORT;ACHING

## 2023-01-22 ASSESSMENT — PAIN DESCRIPTION - FREQUENCY
FREQUENCY: INTERMITTENT
FREQUENCY: CONTINUOUS

## 2023-01-22 ASSESSMENT — PAIN DESCRIPTION - PAIN TYPE
TYPE: ACUTE PAIN

## 2023-01-22 ASSESSMENT — PAIN - FUNCTIONAL ASSESSMENT
PAIN_FUNCTIONAL_ASSESSMENT: ACTIVITIES ARE NOT PREVENTED

## 2023-01-22 NOTE — PROGRESS NOTES
GENERAL SURGERY  DAILY PROGRESS NOTE  1/22/2023    Chief Complaint   Patient presents with    Abdominal Pain     Abd pain for last few days. NVD, bloody stool    Diarrhea    Nausea    Emesis       Subjective:  Naeo  No further bloody bm  Says her pain is improved  No n/v  Vss/af    Objective:  BP (!) 123/58   Pulse 78   Temp 98.6 °F (37 °C) (Oral)   Resp 16   Ht 5' 1\" (1.549 m)   Wt 171 lb (77.6 kg)   SpO2 92%   BMI 32.31 kg/m²     Gen: aox3, nad  Heent: nc/at  Neck: trachea midline, no LAD  Cvs: rrr  Lungs: non labored  Abd: soft, nt/nd, no r/g/r  Neuro: no focal deficits      Assessment/Plan:  75 y.o. female with abd pain and history of ischemic colitis    Improving clinically  No plans for inpatient endoscopy at this time  Ok to advance diet as tolerated from surgery POV   Can followup with me for outpatient colonoscopy  Will continue to follow while here      Davide Kennedy MD FACS  Minimally Invasive General Surgery and Endoscopy  [address] [facility].  [address]: [phone]  F: [phone]      Electronically signed by Mulu Erwin MD on 1/22/2023 at 1:27 PM

## 2023-01-22 NOTE — PROGRESS NOTES
[facility] Progress Note    Admitting Date and Time: 1/20/2023  7:57 PM  Admit Dx: Colitis [K52.9]  GALLEGOS (dyspnea on exertion) [R06.09]    Subjective:  Patient is being followed for Colitis [K52.9]  GALLEGOS (dyspnea on exertion) [R06.09]   Pt seen and examined this morning. No acute events overnight  Sitting on the side of the bed, eating lunch, no acute distress  Denies any acute complaints    ROS: denies fever, chills, cp, sob, n/v, HA unless stated above.       Vitamin D3  1 capsule Oral Weekly    citalopram  30 mg Oral Daily    dilTIAZem  120 mg Oral Daily    fluticasone  2 spray Nasal Daily    levothyroxine  75 mcg Oral Daily    LORazepam  1 mg Oral BID    montelukast  10 mg Oral Nightly    budesonide  250 mcg Nebulization BID    ipratropium  0.5 mg Nebulization 4x daily    arformoterol tartrate  15 mcg Nebulization BID    piperacillin-tazobactam  3,375 mg IntraVENous Q8H    rOPINIRole  1 mg Oral Nightly    sodium chloride flush  5-40 mL IntraVENous 2 times per day     albuterol, 2.5 mg, Q6H PRN  HYDROmorphone, 1 mg, Q4H PRN   Or  HYDROmorphone, 2 mg, Q4H PRN  sodium chloride flush, 5-40 mL, PRN  sodium chloride, , PRN  ondansetron, 4 mg, Q8H PRN   Or  ondansetron, 4 mg, Q6H PRN  polyethylene glycol, 17 g, Daily PRN  acetaminophen, 650 mg, Q6H PRN   Or  acetaminophen, 650 mg, Q6H PRN  potassium chloride, 40 mEq, PRN   Or  potassium alternative oral replacement, 40 mEq, PRN   Or  potassium chloride, 10 mEq, PRN         Objective:    BP (!) 123/58   Pulse 78   Temp 98.6 °F (37 °C) (Oral)   Resp 16   Ht 5' 1\" (1.549 m)   Wt 171 lb (77.6 kg)   SpO2 92%   BMI 32.31 kg/m²     General Appearance: alert and oriented to person, place and time and in no acute distress  Skin: warm and dry  Head: normocephalic and atraumatic  Eyes: pupils equal, round, and reactive to light, extraocular eye movements intact, conjunctivae normal  Neck: neck supple and non tender without mass   Pulmonary/Chest: clear to auscultation bilaterally- no wheezes, rales or rhonchi, normal air movement, no respiratory distress  Cardiovascular: normal rate, normal S1 and S2 and no carotid bruits  Abdomen: soft, mildly tender throughout, non-distended, normal bowel sounds, no masses or organomegaly  Extremities: no cyanosis, no clubbing and no edema  Neurologic: no cranial nerve deficit and speech normal        Recent Labs     01/20/23 2022 01/21/23  0905 01/22/23  1040    132 137   K 3.3* 3.5 3.7   CL 94* 97* 104   CO2 28 24 25   BUN 13 10 7   CREATININE 1.3* 1.2* 1.1*   GLUCOSE 95 113* 137*   CALCIUM 8.9 8.4* 7.8*       Recent Labs     01/20/23 2022 01/21/23  0905 01/22/23  1040   WBC 13.4* 9.6 6.1   RBC 4.43 4.57 3.82   HGB 13.0 13.3 11.1*   HCT 38.5 40.4 34.3   MCV 86.9 88.4 89.8   MCH 29.3 29.1 29.1   MCHC 33.8 32.9 32.4   RDW 12.5 12.7 12.7    283 254   MPV 9.4 9.8 9.7         Assessment and Plan:     Principal Problem:    Colitis  Resolved Problems:    * No resolved hospital problems. *    Abdominal pain, consider for ischemic colitis given previous history of it, GS following, no plans for inpatient endoscopy at this time, patient tolerating full liquid diet with no issues, symptoms improving overall, continue IV hydration today and likely discontinue tomorrow, continue Zosyn, pain control with Dilaudid  Hypertension, continue home meds  COPD, continue breathing treatments  Hypothyroidism, continue Synthroid  Restless leg syndrome, continue ropinirole  Depression, continue Celexa    Time spent reviewing chart, clinical exam, discussing case and answering questions with staff/consultants/patient aprox 25 mins. NOTE: This report was transcribed using voice recognition software. Every effort was made to ensure accuracy; however, inadvertent computerized transcription errors may be present.   Electronically signed by Stefan Drummond MD on 1/22/2023 at 2:30 PM

## 2023-01-23 ENCOUNTER — TELEPHONE (OUTPATIENT)
Dept: CARDIOLOGY CLINIC | Age: 68
End: 2023-01-23

## 2023-01-23 LAB
ALBUMIN SERPL-MCNC: 3.4 G/DL (ref 3.5–5.2)
ALP BLD-CCNC: 55 U/L (ref 35–104)
ALT SERPL-CCNC: 17 U/L (ref 0–32)
ANION GAP SERPL CALCULATED.3IONS-SCNC: 9 MMOL/L (ref 7–16)
AST SERPL-CCNC: 26 U/L (ref 0–31)
BASOPHILS ABSOLUTE: 0.03 E9/L (ref 0–0.2)
BASOPHILS RELATIVE PERCENT: 0.5 % (ref 0–2)
BILIRUB SERPL-MCNC: 0.3 MG/DL (ref 0–1.2)
BUN BLDV-MCNC: 4 MG/DL (ref 6–23)
C DIFF TOXIN/ANTIGEN: NORMAL
CALCIUM SERPL-MCNC: 8.1 MG/DL (ref 8.6–10.2)
CHLORIDE BLD-SCNC: 102 MMOL/L (ref 98–107)
CO2: 24 MMOL/L (ref 22–29)
CREAT SERPL-MCNC: 1 MG/DL (ref 0.5–1)
EOSINOPHILS ABSOLUTE: 0.14 E9/L (ref 0.05–0.5)
EOSINOPHILS RELATIVE PERCENT: 2.3 % (ref 0–6)
GFR SERPL CREATININE-BSD FRML MDRD: >60 ML/MIN/1.73
GLUCOSE BLD-MCNC: 96 MG/DL (ref 74–99)
HCT VFR BLD CALC: 34.3 % (ref 34–48)
HEMOGLOBIN: 11 G/DL (ref 11.5–15.5)
IMMATURE GRANULOCYTES #: 0.06 E9/L
IMMATURE GRANULOCYTES %: 1 % (ref 0–5)
LYMPHOCYTES ABSOLUTE: 2.11 E9/L (ref 1.5–4)
LYMPHOCYTES RELATIVE PERCENT: 34.3 % (ref 20–42)
MAGNESIUM: 1.6 MG/DL (ref 1.6–2.6)
MCH RBC QN AUTO: 28.8 PG (ref 26–35)
MCHC RBC AUTO-ENTMCNC: 32.1 % (ref 32–34.5)
MCV RBC AUTO: 89.8 FL (ref 80–99.9)
MONOCYTES ABSOLUTE: 0.58 E9/L (ref 0.1–0.95)
MONOCYTES RELATIVE PERCENT: 9.4 % (ref 2–12)
NEUTROPHILS ABSOLUTE: 3.24 E9/L (ref 1.8–7.3)
NEUTROPHILS RELATIVE PERCENT: 52.5 % (ref 43–80)
PDW BLD-RTO: 12.8 FL (ref 11.5–15)
PLATELET # BLD: 218 E9/L (ref 130–450)
PMV BLD AUTO: 9.8 FL (ref 7–12)
POTASSIUM REFLEX MAGNESIUM: 3.5 MMOL/L (ref 3.5–5)
RBC # BLD: 3.82 E12/L (ref 3.5–5.5)
RBC # BLD: NORMAL 10*6/UL
SODIUM BLD-SCNC: 135 MMOL/L (ref 132–146)
TOTAL PROTEIN: 5.9 G/DL (ref 6.4–8.3)
WBC # BLD: 6.2 E9/L (ref 4.5–11.5)

## 2023-01-23 PROCEDURE — 6360000002 HC RX W HCPCS: Performed by: FAMILY MEDICINE

## 2023-01-23 PROCEDURE — 80053 COMPREHEN METABOLIC PANEL: CPT

## 2023-01-23 PROCEDURE — 6360000002 HC RX W HCPCS: Performed by: STUDENT IN AN ORGANIZED HEALTH CARE EDUCATION/TRAINING PROGRAM

## 2023-01-23 PROCEDURE — 2580000003 HC RX 258: Performed by: INTERNAL MEDICINE

## 2023-01-23 PROCEDURE — 85025 COMPLETE CBC W/AUTO DIFF WBC: CPT

## 2023-01-23 PROCEDURE — 36415 COLL VENOUS BLD VENIPUNCTURE: CPT

## 2023-01-23 PROCEDURE — 6360000002 HC RX W HCPCS: Performed by: INTERNAL MEDICINE

## 2023-01-23 PROCEDURE — 83735 ASSAY OF MAGNESIUM: CPT

## 2023-01-23 PROCEDURE — 94640 AIRWAY INHALATION TREATMENT: CPT

## 2023-01-23 PROCEDURE — 6370000000 HC RX 637 (ALT 250 FOR IP): Performed by: STUDENT IN AN ORGANIZED HEALTH CARE EDUCATION/TRAINING PROGRAM

## 2023-01-23 PROCEDURE — 6370000000 HC RX 637 (ALT 250 FOR IP): Performed by: INTERNAL MEDICINE

## 2023-01-23 PROCEDURE — 1200000000 HC SEMI PRIVATE

## 2023-01-23 PROCEDURE — 2580000003 HC RX 258: Performed by: STUDENT IN AN ORGANIZED HEALTH CARE EDUCATION/TRAINING PROGRAM

## 2023-01-23 PROCEDURE — 6370000000 HC RX 637 (ALT 250 FOR IP): Performed by: FAMILY MEDICINE

## 2023-01-23 PROCEDURE — 99233 SBSQ HOSP IP/OBS HIGH 50: CPT | Performed by: FAMILY MEDICINE

## 2023-01-23 RX ORDER — LACTOBACILLUS RHAMNOSUS GG 10B CELL
1 CAPSULE ORAL DAILY
Status: DISCONTINUED | OUTPATIENT
Start: 2023-01-23 | End: 2023-01-26 | Stop reason: HOSPADM

## 2023-01-23 RX ORDER — LISINOPRIL 20 MG/1
20 TABLET ORAL DAILY
Status: DISCONTINUED | OUTPATIENT
Start: 2023-01-23 | End: 2023-01-26 | Stop reason: HOSPADM

## 2023-01-23 RX ORDER — MAGNESIUM SULFATE IN WATER 40 MG/ML
2000 INJECTION, SOLUTION INTRAVENOUS ONCE
Status: COMPLETED | OUTPATIENT
Start: 2023-01-23 | End: 2023-01-23

## 2023-01-23 RX ORDER — ERGOCALCIFEROL 1.25 MG/1
50000 CAPSULE ORAL WEEKLY
Status: DISCONTINUED | OUTPATIENT
Start: 2023-01-27 | End: 2023-01-26 | Stop reason: HOSPADM

## 2023-01-23 RX ORDER — METRONIDAZOLE 500 MG/1
500 TABLET ORAL 3 TIMES DAILY
Qty: 21 TABLET | Refills: 0 | Status: SHIPPED | OUTPATIENT
Start: 2023-01-23 | End: 2023-01-24 | Stop reason: SDUPTHER

## 2023-01-23 RX ORDER — HYDROCHLOROTHIAZIDE 12.5 MG/1
12.5 TABLET ORAL DAILY
Status: DISCONTINUED | OUTPATIENT
Start: 2023-01-23 | End: 2023-01-26 | Stop reason: HOSPADM

## 2023-01-23 RX ORDER — CEFDINIR 300 MG/1
300 CAPSULE ORAL 2 TIMES DAILY
Qty: 14 CAPSULE | Refills: 0 | Status: SHIPPED | OUTPATIENT
Start: 2023-01-23 | End: 2023-01-26 | Stop reason: SDUPTHER

## 2023-01-23 RX ORDER — LOPERAMIDE HYDROCHLORIDE 2 MG/1
2 CAPSULE ORAL 4 TIMES DAILY PRN
Status: DISCONTINUED | OUTPATIENT
Start: 2023-01-23 | End: 2023-01-26 | Stop reason: HOSPADM

## 2023-01-23 RX ADMIN — IPRATROPIUM BROMIDE 0.5 MG: 0.5 SOLUTION RESPIRATORY (INHALATION) at 09:42

## 2023-01-23 RX ADMIN — HYDROMORPHONE HYDROCHLORIDE 1 MG: 1 INJECTION, SOLUTION INTRAMUSCULAR; INTRAVENOUS; SUBCUTANEOUS at 13:38

## 2023-01-23 RX ADMIN — HYDROMORPHONE HYDROCHLORIDE 1 MG: 1 INJECTION, SOLUTION INTRAMUSCULAR; INTRAVENOUS; SUBCUTANEOUS at 08:36

## 2023-01-23 RX ADMIN — LEVOTHYROXINE SODIUM 75 MCG: 75 TABLET ORAL at 05:48

## 2023-01-23 RX ADMIN — PIPERACILLIN AND TAZOBACTAM 3375 MG: 3; .375 INJECTION, POWDER, FOR SOLUTION INTRAVENOUS at 14:22

## 2023-01-23 RX ADMIN — ROPINIROLE HYDROCHLORIDE 1 MG: 1 TABLET, FILM COATED ORAL at 21:07

## 2023-01-23 RX ADMIN — HYDROCHLOROTHIAZIDE 12.5 MG: 12.5 TABLET ORAL at 14:17

## 2023-01-23 RX ADMIN — HYDROMORPHONE HYDROCHLORIDE 1 MG: 1 INJECTION, SOLUTION INTRAMUSCULAR; INTRAVENOUS; SUBCUTANEOUS at 23:24

## 2023-01-23 RX ADMIN — HYDROMORPHONE HYDROCHLORIDE 1 MG: 1 INJECTION, SOLUTION INTRAMUSCULAR; INTRAVENOUS; SUBCUTANEOUS at 18:59

## 2023-01-23 RX ADMIN — MONTELUKAST SODIUM 10 MG: 10 TABLET ORAL at 21:07

## 2023-01-23 RX ADMIN — SODIUM CHLORIDE: 9 INJECTION, SOLUTION INTRAVENOUS at 11:11

## 2023-01-23 RX ADMIN — FLUTICASONE PROPIONATE 2 SPRAY: 50 SPRAY, METERED NASAL at 08:37

## 2023-01-23 RX ADMIN — LISINOPRIL 20 MG: 20 TABLET ORAL at 14:17

## 2023-01-23 RX ADMIN — MAGNESIUM SULFATE HEPTAHYDRATE 2000 MG: 40 INJECTION, SOLUTION INTRAVENOUS at 11:14

## 2023-01-23 RX ADMIN — SODIUM CHLORIDE, PRESERVATIVE FREE 10 ML: 5 INJECTION INTRAVENOUS at 08:37

## 2023-01-23 RX ADMIN — IPRATROPIUM BROMIDE 0.5 MG: 0.5 SOLUTION RESPIRATORY (INHALATION) at 13:33

## 2023-01-23 RX ADMIN — ARFORMOTEROL TARTRATE 15 MCG: 15 SOLUTION RESPIRATORY (INHALATION) at 20:36

## 2023-01-23 RX ADMIN — BUDESONIDE 250 MCG: 0.25 SUSPENSION RESPIRATORY (INHALATION) at 09:42

## 2023-01-23 RX ADMIN — LOPERAMIDE HYDROCHLORIDE 2 MG: 2 CAPSULE ORAL at 14:17

## 2023-01-23 RX ADMIN — LORAZEPAM 1 MG: 1 TABLET ORAL at 21:07

## 2023-01-23 RX ADMIN — IPRATROPIUM BROMIDE 0.5 MG: 0.5 SOLUTION RESPIRATORY (INHALATION) at 20:36

## 2023-01-23 RX ADMIN — DILTIAZEM HYDROCHLORIDE 120 MG: 120 CAPSULE, COATED, EXTENDED RELEASE ORAL at 08:36

## 2023-01-23 RX ADMIN — CITALOPRAM HYDROBROMIDE 30 MG: 20 TABLET ORAL at 08:36

## 2023-01-23 RX ADMIN — LORAZEPAM 1 MG: 1 TABLET ORAL at 08:36

## 2023-01-23 RX ADMIN — PIPERACILLIN AND TAZOBACTAM 3375 MG: 3; .375 INJECTION, POWDER, FOR SOLUTION INTRAVENOUS at 05:46

## 2023-01-23 RX ADMIN — ARFORMOTEROL TARTRATE 15 MCG: 15 SOLUTION RESPIRATORY (INHALATION) at 09:42

## 2023-01-23 RX ADMIN — BUDESONIDE 250 MCG: 0.25 SUSPENSION RESPIRATORY (INHALATION) at 20:36

## 2023-01-23 RX ADMIN — HYDROMORPHONE HYDROCHLORIDE 1 MG: 1 INJECTION, SOLUTION INTRAMUSCULAR; INTRAVENOUS; SUBCUTANEOUS at 03:26

## 2023-01-23 RX ADMIN — Medication 1 CAPSULE: at 11:11

## 2023-01-23 RX ADMIN — PIPERACILLIN AND TAZOBACTAM 3375 MG: 3; .375 INJECTION, POWDER, FOR SOLUTION INTRAVENOUS at 21:09

## 2023-01-23 ASSESSMENT — PAIN SCALES - GENERAL
PAINLEVEL_OUTOF10: 7
PAINLEVEL_OUTOF10: 6
PAINLEVEL_OUTOF10: 7
PAINLEVEL_OUTOF10: 7

## 2023-01-23 ASSESSMENT — PAIN DESCRIPTION - ORIENTATION
ORIENTATION: LEFT
ORIENTATION: LEFT;LOWER
ORIENTATION: RIGHT;LEFT

## 2023-01-23 ASSESSMENT — PAIN DESCRIPTION - LOCATION
LOCATION: ABDOMEN

## 2023-01-23 ASSESSMENT — PAIN - FUNCTIONAL ASSESSMENT: PAIN_FUNCTIONAL_ASSESSMENT: ACTIVITIES ARE NOT PREVENTED

## 2023-01-23 ASSESSMENT — PAIN DESCRIPTION - DESCRIPTORS
DESCRIPTORS: SHARP;STABBING
DESCRIPTORS: STABBING;SHARP
DESCRIPTORS: CRAMPING;DISCOMFORT
DESCRIPTORS: ACHING;SORE;TENDER

## 2023-01-23 NOTE — PROGRESS NOTES
GENERAL SURGERY  DAILY PROGRESS NOTE  1/23/2023    Chief Complaint   Patient presents with    Abdominal Pain     Abd pain for last few days. NVD, bloody stool    Diarrhea    Nausea    Emesis       Subjective:  Pain ok today, tolerating fulls. Having flatus and 2x small bm overnight. No n/v  Objective:  BP (!) 155/69   Pulse 71   Temp 98.1 °F (36.7 °C) (Oral)   Resp 16   Ht 5' 1\" (1.549 m)   Wt 177 lb (80.3 kg)   SpO2 96%   BMI 33.44 kg/m²     Gen: aox3, nad  Heent: nc/at  Neck: trachea midline, no LAD  Cvs: rrr  Lungs: non labored  Abd: soft, nt/nd, no r/g/r  Neuro: no focal deficits      Assessment/Plan:  75 y.o. female with abd pain and history of ischemic colitis    Improving clinically  No plans for inpatient endoscopy at this time  Ok to advance diet as tolerated from surgery POV   Can followup with me for outpatient colonoscopy  Ok for discharge from surgery perspective w/ PO abx      Electronically signed by Lillian Urena MD on 1/23/2023 at 7:41 AM      Patient seen and examined; agree w above  F/u for OP scopes    Sanam Armstrong MD FACS  Minimally Invasive General Surgery and Endoscopy  [address] [facility].  [address]: [phone]  F: [phone]    Electronically signed by Suman Martini MD on 1/23/2023 at 7:47 AM

## 2023-01-23 NOTE — PROGRESS NOTES
[facility] Progress Note    Admitting Date and Time: 1/20/2023  7:57 PM  Admit Dx: Colitis [K52.9]  GALLEGOS (dyspnea on exertion) [R06.09]    Subjective:  Patient is being followed for Colitis [K52.9]  GALLEGOS (dyspnea on exertion) [R06.09]   Pt feels so-so. Has had 6 stools already this am.  C diff pending. Diet advanced from Full Liquid to Regular. Per RN: as above. ROS: denies fever, chills, cp, sob, n/v, HA unless stated above.       [START ON 1/27/2023] vitamin D  50,000 Units Oral Weekly    magnesium sulfate  2,000 mg IntraVENous Once    lactobacillus  1 capsule Oral Daily    citalopram  30 mg Oral Daily    dilTIAZem  120 mg Oral Daily    fluticasone  2 spray Nasal Daily    levothyroxine  75 mcg Oral Daily    LORazepam  1 mg Oral BID    montelukast  10 mg Oral Nightly    budesonide  250 mcg Nebulization BID    ipratropium  0.5 mg Nebulization 4x daily    arformoterol tartrate  15 mcg Nebulization BID    piperacillin-tazobactam  3,375 mg IntraVENous Q8H    rOPINIRole  1 mg Oral Nightly    sodium chloride flush  5-40 mL IntraVENous 2 times per day     albuterol, 2.5 mg, Q6H PRN  HYDROmorphone, 1 mg, Q4H PRN   Or  HYDROmorphone, 2 mg, Q4H PRN  sodium chloride flush, 5-40 mL, PRN  sodium chloride, , PRN  ondansetron, 4 mg, Q8H PRN   Or  ondansetron, 4 mg, Q6H PRN  polyethylene glycol, 17 g, Daily PRN  acetaminophen, 650 mg, Q6H PRN   Or  acetaminophen, 650 mg, Q6H PRN  potassium chloride, 40 mEq, PRN   Or  potassium alternative oral replacement, 40 mEq, PRN   Or  potassium chloride, 10 mEq, PRN         Objective:    BP (!) 155/69   Pulse 71   Temp 98.1 °F (36.7 °C) (Oral)   Resp 16   Ht 5' 1\" (1.549 m)   Wt 177 lb (80.3 kg)   SpO2 96%   BMI 33.44 kg/m²     General Appearance: alert and oriented to person, place and time and in no acute distress  Skin: warm and dry  Head: normocephalic and atraumatic  Eyes: pupils equal, round, and reactive to light, extraocular eye movements intact, conjunctivae normal  Neck: neck supple and non tender without mass   Pulmonary/Chest: clear to auscultation bilaterally- no wheezes, rales or rhonchi, normal air movement, no respiratory distress  Cardiovascular: normal rate, normal S1 and S2 and no carotid bruits  Abdomen: soft, non-tender, non-distended, normal bowel sounds, no masses or organomegaly  Extremities: no cyanosis, no clubbing and no edema  Neurologic: no cranial nerve deficit and speech normal        Recent Labs     01/21/23  0905 01/22/23  1040 01/23/23  0634    137 135   K 3.5 3.7 3.5   CL 97* 104 102   CO2 24 25 24   BUN 10 7 4*   CREATININE 1.2* 1.1* 1.0   GLUCOSE 113* 137* 96   CALCIUM 8.4* 7.8* 8.1*       Recent Labs     01/21/23  0905 01/22/23  1040 01/23/23  0634   WBC 9.6 6.1 6.2   RBC 4.57 3.82 3.82   HGB 13.3 11.1* 11.0*   HCT 40.4 34.3 34.3   MCV 88.4 89.8 89.8   MCH 29.1 29.1 28.8   MCHC 32.9 32.4 32.1   RDW 12.7 12.7 12.8    254 218   MPV 9.8 9.7 9.8      Magnesium 1.6 low normal.    Radiology:   No results found. Assessment:    Principal Problem:    Colitis  Resolved Problems:    * No resolved hospital problems. *      Plan:  1. Acute diarrhea - stool culture negative thus far. C diff pending. Advanced to Regular diet. Lactobacillus ordered. 2.  Hypomagnesemia - Mg2+ low normal at 1.6.  2 gm IV Mag ordered given her frequency of stools. Trend labs and treat accordingly. 3.  HTN - Continue meds. Monitor vitals and adjust accordingly  4. Hypothyroidism - continue meds. 5.  Restless leg syndrome - Continue meds. Monitor vitals and adjust accordingly  6. COPD - supportive care. Continue nebs/respiratory support. 7.  Depression - Continue meds. Monitor vitals and adjust accordingly    NOTE: This report was transcribed using voice recognition software. Every effort was made to ensure accuracy; however, inadvertent computerized transcription errors may be present.     Electronically signed by Gage Rose MD on 1/23/2023 at 11:01 AM

## 2023-01-23 NOTE — PROGRESS NOTES
Occupational Therapy    OT order received and chart reviewed. Pt reported completing ADLs/functional mobility/transfers Mod I, demo'ing good balance/safety awareness. Pt reports no needed DME for home. Pt demonstrates no OT needs at this time. OT order discontinued. Please re-consult if there is a change in functional status.     Berta Davis, [address] [facility], OTR/L [id number]

## 2023-01-23 NOTE — CARE COORDINATION
Case Management Assessment  Initial Evaluation    Date/Time of Evaluation: 1/23/2023 11:57 AM  Assessment Completed by: Cony Ruiz RN    If patient is discharged prior to next notation, then this note serves as note for discharge by case management. Patient Name: Vandana Lentz                   YOB: 1955  Diagnosis: Colitis [K52.9]  GALLEGOS (dyspnea on exertion) [R06.09]                   Date / Time: 1/20/2023  7:57 PM    Patient Admission Status: Inpatient   Readmission Risk (Low < 19, Mod (19-27), High > 27): Readmission Risk Score: 11.4    Current PCP: Rolando Foote, DO  PCP verified by CM? Yes    Chart Reviewed: Yes      History Provided by: Patient  Patient Orientation: Alert and Oriented, Person, Place, Situation    Patient Cognition: Alert    Hospitalization in the last 30 days (Readmission):  No    If yes, Readmission Assessment in CM Navigator will be completed. Advance Directives:      Code Status: Full Code   Patient's Primary Decision Maker is:      Primary Decision Maker: Rainer Bergeron - Brother/Sister - [phone]    Discharge Planning:    Patient lives with: Alone Type of Home: House  Primary Care Giver: Self  Patient Support Systems include: None   Current Financial resources: Medicare  Current community resources: None  Current services prior to admission: None            Current DME:              Type of Home Care services:  None    ADLS  Prior functional level: Independent in ADLs/IADLs  Current functional level: Independent in ADLs/IADLs    PT AM-PAC:   /24  OT AM-PAC:   /24    Family can provide assistance at DC: No  Would you like Case Management to discuss the discharge plan with any other family members/significant others, and if so, who?     Plans to Return to Present Housing: Yes  Other Identified Issues/Barriers to RETURNING to current housing: None  Potential Assistance needed at discharge: N/A            Potential DME:    Patient expects to discharge to: House  Plan for transportation at discharge:      Financial    Payor: Kira Ivey Jessica / Plan: [place] / Product Type: *No Product type* /     Does insurance require precert for SNF: Yes    Potential assistance Purchasing Medications: No  Meds-to-Beds request:        [id number] [address]  [place] [address]  Phone: [phone] Fax: [phone]      Notes:    Factors facilitating achievement of predicted outcomes: Pleasant and Sense of humor    Barriers to discharge: No family support    Additional Case Management Notes: plan return to home alone; denies post discharge needs at this time    The Plan for Transition of Care is related to the following treatment goals of Colitis [K52.9]  GALLEGOS (dyspnea on exertion) [S39.75]    IF APPLICABLE: The Patient and/or patient representative Nolan Harris and her family were provided with a choice of provider and agrees with the discharge plan. Freedom of choice list with basic dialogue that supports the patient's individualized plan of care/goals and shares the quality data associated with the providers was provided to:     Patient Representative Name:       The Patient and/or Patient Representative Agree with the Discharge Plan?       Patience Carrington RN  Case Management Department  Ph: [phone] Fax: [phone]

## 2023-01-23 NOTE — PATIENT CARE CONFERENCE
[facility] Quality Flow/Interdisciplinary Rounds Progress Note        Quality Flow Rounds held on January 23, 2023    Disciplines Attending:  Bedside Nurse, , , and Nursing Unit Leadership    Rianna Syed was admitted on 1/20/2023  7:57 PM    Anticipated Discharge Date:       Disposition:    Billy Score:  Billy Scale Score: 20    Readmission Risk              Risk of Unplanned Readmission:  14           Discussed patient goal for the day, patient clinical progression, and barriers to discharge.   The following Goal(s) of the Day/Commitment(s) have been identified:  Labs - Report Results      Jeannette Camacho RN  January 23, 2023

## 2023-01-24 PROBLEM — R19.7 DIARRHEA: Status: ACTIVE | Noted: 2023-01-24

## 2023-01-24 PROBLEM — E83.42 HYPOMAGNESEMIA: Status: ACTIVE | Noted: 2023-01-24

## 2023-01-24 LAB
ANION GAP SERPL CALCULATED.3IONS-SCNC: 8 MMOL/L (ref 7–16)
BUN BLDV-MCNC: 4 MG/DL (ref 6–23)
CALCIUM SERPL-MCNC: 8.9 MG/DL (ref 8.6–10.2)
CHLORIDE BLD-SCNC: 102 MMOL/L (ref 98–107)
CO2: 27 MMOL/L (ref 22–29)
CREAT SERPL-MCNC: 1 MG/DL (ref 0.5–1)
CULTURE, STOOL: NORMAL
GFR SERPL CREATININE-BSD FRML MDRD: >60 ML/MIN/1.73
GLUCOSE BLD-MCNC: 106 MG/DL (ref 74–99)
HCT VFR BLD CALC: 33.7 % (ref 34–48)
HEMOGLOBIN: 11.1 G/DL (ref 11.5–15.5)
MAGNESIUM: 1.7 MG/DL (ref 1.6–2.6)
MCH RBC QN AUTO: 29.1 PG (ref 26–35)
MCHC RBC AUTO-ENTMCNC: 32.9 % (ref 32–34.5)
MCV RBC AUTO: 88.2 FL (ref 80–99.9)
PDW BLD-RTO: 13 FL (ref 11.5–15)
PLATELET # BLD: 258 E9/L (ref 130–450)
PMV BLD AUTO: 9.7 FL (ref 7–12)
POTASSIUM SERPL-SCNC: 3.5 MMOL/L (ref 3.5–5)
RBC # BLD: 3.82 E12/L (ref 3.5–5.5)
SODIUM BLD-SCNC: 137 MMOL/L (ref 132–146)
WBC # BLD: 7.3 E9/L (ref 4.5–11.5)

## 2023-01-24 PROCEDURE — 6360000002 HC RX W HCPCS: Performed by: STUDENT IN AN ORGANIZED HEALTH CARE EDUCATION/TRAINING PROGRAM

## 2023-01-24 PROCEDURE — 99233 SBSQ HOSP IP/OBS HIGH 50: CPT | Performed by: FAMILY MEDICINE

## 2023-01-24 PROCEDURE — 2580000003 HC RX 258: Performed by: STUDENT IN AN ORGANIZED HEALTH CARE EDUCATION/TRAINING PROGRAM

## 2023-01-24 PROCEDURE — 36415 COLL VENOUS BLD VENIPUNCTURE: CPT

## 2023-01-24 PROCEDURE — 2580000003 HC RX 258: Performed by: INTERNAL MEDICINE

## 2023-01-24 PROCEDURE — 6360000002 HC RX W HCPCS: Performed by: INTERNAL MEDICINE

## 2023-01-24 PROCEDURE — 80048 BASIC METABOLIC PNL TOTAL CA: CPT

## 2023-01-24 PROCEDURE — 85027 COMPLETE CBC AUTOMATED: CPT

## 2023-01-24 PROCEDURE — 6370000000 HC RX 637 (ALT 250 FOR IP): Performed by: INTERNAL MEDICINE

## 2023-01-24 PROCEDURE — 99231 SBSQ HOSP IP/OBS SF/LOW 25: CPT | Performed by: INTERNAL MEDICINE

## 2023-01-24 PROCEDURE — 6370000000 HC RX 637 (ALT 250 FOR IP): Performed by: FAMILY MEDICINE

## 2023-01-24 PROCEDURE — 94640 AIRWAY INHALATION TREATMENT: CPT

## 2023-01-24 PROCEDURE — 1200000000 HC SEMI PRIVATE

## 2023-01-24 PROCEDURE — 83735 ASSAY OF MAGNESIUM: CPT

## 2023-01-24 PROCEDURE — 6370000000 HC RX 637 (ALT 250 FOR IP): Performed by: STUDENT IN AN ORGANIZED HEALTH CARE EDUCATION/TRAINING PROGRAM

## 2023-01-24 RX ORDER — METRONIDAZOLE 500 MG/1
500 TABLET ORAL 3 TIMES DAILY
Qty: 30 TABLET | Refills: 0 | Status: SHIPPED | OUTPATIENT
Start: 2023-01-24 | End: 2023-01-26 | Stop reason: SDUPTHER

## 2023-01-24 RX ORDER — LORAZEPAM 0.5 MG/1
0.5 TABLET ORAL ONCE
Status: DISCONTINUED | OUTPATIENT
Start: 2023-01-24 | End: 2023-01-26 | Stop reason: HOSPADM

## 2023-01-24 RX ORDER — MAGNESIUM SULFATE IN WATER 40 MG/ML
2000 INJECTION, SOLUTION INTRAVENOUS ONCE
Status: COMPLETED | OUTPATIENT
Start: 2023-01-24 | End: 2023-01-24

## 2023-01-24 RX ADMIN — HYDROMORPHONE HYDROCHLORIDE 1 MG: 1 INJECTION, SOLUTION INTRAMUSCULAR; INTRAVENOUS; SUBCUTANEOUS at 08:17

## 2023-01-24 RX ADMIN — HYDROMORPHONE HYDROCHLORIDE 1 MG: 1 INJECTION, SOLUTION INTRAMUSCULAR; INTRAVENOUS; SUBCUTANEOUS at 12:56

## 2023-01-24 RX ADMIN — HYDROMORPHONE HYDROCHLORIDE 1 MG: 1 INJECTION, SOLUTION INTRAMUSCULAR; INTRAVENOUS; SUBCUTANEOUS at 03:22

## 2023-01-24 RX ADMIN — ARFORMOTEROL TARTRATE 15 MCG: 15 SOLUTION RESPIRATORY (INHALATION) at 07:58

## 2023-01-24 RX ADMIN — ONDANSETRON 4 MG: 2 INJECTION INTRAMUSCULAR; INTRAVENOUS at 11:46

## 2023-01-24 RX ADMIN — Medication 1 CAPSULE: at 08:15

## 2023-01-24 RX ADMIN — HYDROCHLOROTHIAZIDE 12.5 MG: 12.5 TABLET ORAL at 08:14

## 2023-01-24 RX ADMIN — LOPERAMIDE HYDROCHLORIDE 2 MG: 2 CAPSULE ORAL at 11:46

## 2023-01-24 RX ADMIN — BUDESONIDE 250 MCG: 0.25 SUSPENSION RESPIRATORY (INHALATION) at 19:55

## 2023-01-24 RX ADMIN — MAGNESIUM SULFATE HEPTAHYDRATE 2000 MG: 40 INJECTION, SOLUTION INTRAVENOUS at 16:20

## 2023-01-24 RX ADMIN — SODIUM CHLORIDE: 9 INJECTION, SOLUTION INTRAVENOUS at 11:03

## 2023-01-24 RX ADMIN — IPRATROPIUM BROMIDE 0.5 MG: 0.5 SOLUTION RESPIRATORY (INHALATION) at 16:21

## 2023-01-24 RX ADMIN — PIPERACILLIN AND TAZOBACTAM 3375 MG: 3; .375 INJECTION, POWDER, FOR SOLUTION INTRAVENOUS at 21:32

## 2023-01-24 RX ADMIN — LORAZEPAM 1 MG: 1 TABLET ORAL at 21:26

## 2023-01-24 RX ADMIN — LISINOPRIL 20 MG: 20 TABLET ORAL at 08:15

## 2023-01-24 RX ADMIN — IPRATROPIUM BROMIDE 0.5 MG: 0.5 SOLUTION RESPIRATORY (INHALATION) at 19:55

## 2023-01-24 RX ADMIN — MONTELUKAST SODIUM 10 MG: 10 TABLET ORAL at 21:26

## 2023-01-24 RX ADMIN — ROPINIROLE HYDROCHLORIDE 1 MG: 1 TABLET, FILM COATED ORAL at 21:26

## 2023-01-24 RX ADMIN — FLUTICASONE PROPIONATE 2 SPRAY: 50 SPRAY, METERED NASAL at 08:15

## 2023-01-24 RX ADMIN — ONDANSETRON 4 MG: 2 INJECTION INTRAMUSCULAR; INTRAVENOUS at 23:20

## 2023-01-24 RX ADMIN — LORAZEPAM 1 MG: 1 TABLET ORAL at 08:15

## 2023-01-24 RX ADMIN — IPRATROPIUM BROMIDE 0.5 MG: 0.5 SOLUTION RESPIRATORY (INHALATION) at 07:58

## 2023-01-24 RX ADMIN — LEVOTHYROXINE SODIUM 75 MCG: 75 TABLET ORAL at 06:21

## 2023-01-24 RX ADMIN — DILTIAZEM HYDROCHLORIDE 120 MG: 120 CAPSULE, COATED, EXTENDED RELEASE ORAL at 08:15

## 2023-01-24 RX ADMIN — BUDESONIDE 250 MCG: 0.25 SUSPENSION RESPIRATORY (INHALATION) at 07:58

## 2023-01-24 RX ADMIN — FLUTICASONE PROPIONATE 2 SPRAY: 50 SPRAY, METERED NASAL at 07:58

## 2023-01-24 RX ADMIN — SODIUM CHLORIDE, PRESERVATIVE FREE 10 ML: 5 INJECTION INTRAVENOUS at 08:18

## 2023-01-24 RX ADMIN — ARFORMOTEROL TARTRATE 15 MCG: 15 SOLUTION RESPIRATORY (INHALATION) at 19:55

## 2023-01-24 RX ADMIN — HYDROMORPHONE HYDROCHLORIDE 1 MG: 1 INJECTION, SOLUTION INTRAMUSCULAR; INTRAVENOUS; SUBCUTANEOUS at 23:20

## 2023-01-24 RX ADMIN — HYDROMORPHONE HYDROCHLORIDE 1 MG: 1 INJECTION, SOLUTION INTRAMUSCULAR; INTRAVENOUS; SUBCUTANEOUS at 18:36

## 2023-01-24 RX ADMIN — LOPERAMIDE HYDROCHLORIDE 2 MG: 2 CAPSULE ORAL at 08:15

## 2023-01-24 RX ADMIN — LOPERAMIDE HYDROCHLORIDE 2 MG: 2 CAPSULE ORAL at 18:36

## 2023-01-24 RX ADMIN — IPRATROPIUM BROMIDE 0.5 MG: 0.5 SOLUTION RESPIRATORY (INHALATION) at 12:25

## 2023-01-24 RX ADMIN — PIPERACILLIN AND TAZOBACTAM 3375 MG: 3; .375 INJECTION, POWDER, FOR SOLUTION INTRAVENOUS at 06:15

## 2023-01-24 RX ADMIN — PIPERACILLIN AND TAZOBACTAM 3375 MG: 3; .375 INJECTION, POWDER, FOR SOLUTION INTRAVENOUS at 14:21

## 2023-01-24 ASSESSMENT — PAIN SCALES - GENERAL
PAINLEVEL_OUTOF10: 8
PAINLEVEL_OUTOF10: 7
PAINLEVEL_OUTOF10: 7
PAINLEVEL_OUTOF10: 4
PAINLEVEL_OUTOF10: 7
PAINLEVEL_OUTOF10: 8
PAINLEVEL_OUTOF10: 7

## 2023-01-24 ASSESSMENT — PAIN DESCRIPTION - ORIENTATION
ORIENTATION: LEFT;LOWER
ORIENTATION: RIGHT;LEFT
ORIENTATION: LEFT
ORIENTATION: LOWER
ORIENTATION: RIGHT;LEFT;MID;LOWER
ORIENTATION: LEFT

## 2023-01-24 ASSESSMENT — PAIN DESCRIPTION - LOCATION
LOCATION: ABDOMEN

## 2023-01-24 ASSESSMENT — PAIN DESCRIPTION - DESCRIPTORS
DESCRIPTORS: CRAMPING
DESCRIPTORS: ACHING;PRESSURE
DESCRIPTORS: ACHING;GNAWING;PRESSURE
DESCRIPTORS: ACHING;SORE;TENDER
DESCRIPTORS: SHARP;SPASM;STABBING
DESCRIPTORS: ACHING;SORE

## 2023-01-24 ASSESSMENT — PAIN DESCRIPTION - ONSET: ONSET: ON-GOING

## 2023-01-24 ASSESSMENT — PAIN - FUNCTIONAL ASSESSMENT
PAIN_FUNCTIONAL_ASSESSMENT: ACTIVITIES ARE NOT PREVENTED

## 2023-01-24 ASSESSMENT — PAIN DESCRIPTION - FREQUENCY: FREQUENCY: CONTINUOUS

## 2023-01-24 NOTE — PATIENT CARE CONFERENCE
P Quality Flow/Interdisciplinary Rounds Progress Note        Quality Flow Rounds held on January 24, 2023    Disciplines Attending:  Bedside Nurse, , , and Nursing Unit Leadership    Suzy Villarreal was admitted on 1/20/2023  7:57 PM    Anticipated Discharge Date:       Disposition:    Billy Score:  Billy Scale Score: 22    Readmission Risk              Risk of Unplanned Readmission:  15           Discussed patient goal for the day, patient clinical progression, and barriers to discharge.   The following Goal(s) of the Day/Commitment(s) have been identified:  Labs - Report Results      Gustabo Arzola RN  January 24, 2023

## 2023-01-24 NOTE — PROGRESS NOTES
Physician Progress Note      PATIENT:               Lynsey Angulo  CSN #:                  [id number]  :                       1955  ADMIT DATE:       2023 7:57 PM  [address] [place] DATE:  RESPONDING  PROVIDER #:        Cory Coolye MD          QUERY TEXT:    Patient admitted with N/V/D. Documentation reflects ischemic colitis per H&P   and IM  note. If possible, please document in the progress notes and   discharge summary if ischemic colitis was: The medical record reflects the following:  Risk Factors: COPD, h/o ischemic colitis  Clinical Indicators: per IM  \". Cheyanne Stands Cheyanne Stands Abdominal pain, consider for ischemic   colitis given previous history of it, GS following. Cheyanne Stands Cheyanne Stands \", per surgery \". ..abd   pain and history of ischemic colitis. Cheyanne Stands Cheyanne Stands Improving clinically. No plans for   inpatient endoscopy at this time. Ok to advance diet as tolerated from surgery   POV. Cheyanne Stands Cheyanne Stands \"  Treatment: surgical consult, IV Zosyn    Thank you,  April Nettie Cummings RN, BSN, CDIS  Clinical Documentation Integrity  [email or web address]  Options provided:  -- Ischemic colitis confirmed after study  -- Ischemic colitis treated and resolved  -- Ischemic colitis ruled out after study  -- Other - I will add my own diagnosis  -- Disagree - Not applicable / Not valid  -- Disagree - Clinically unable to determine / Unknown  -- Refer to Clinical Documentation Reviewer    PROVIDER RESPONSE TEXT:    Ischemic colitis ruled out after study.     Query created by: Ingris Virgen on 2023 12:28 PM      Electronically signed by:  Cory Cooley MD 2023 3:37 PM

## 2023-01-24 NOTE — PLAN OF CARE
Problem: Pain  Goal: Verbalizes/displays adequate comfort level or baseline comfort level  1/24/2023 0105 by Mounika Gloria, RN  Outcome: Progressing     Problem: Safety - Adult  Goal: Free from fall injury  Outcome: Progressing     Problem: Respiratory - Adult  Goal: Achieves optimal ventilation and oxygenation  Outcome: Progressing     Problem: Musculoskeletal - Adult  Goal: Return mobility to safest level of function  Outcome: Progressing     Problem: Gastrointestinal - Adult  Goal: Minimal or absence of nausea and vomiting  Outcome: Progressing     Problem: Gastrointestinal - Adult  Goal: Maintains or returns to baseline bowel function  Outcome: Progressing     Problem: Genitourinary - Adult  Goal: Absence of urinary retention  Outcome: Progressing

## 2023-01-24 NOTE — PROGRESS NOTES
[facility] Progress Note    Admitting Date and Time: 1/20/2023  7:57 PM  Admit Dx: Colitis [K52.9]  GALLEGOS (dyspnea on exertion) [R06.09]    Subjective:  Patient is being followed for Colitis [K52.9]  GALLEGOS (dyspnea on exertion) [R06.09]     Patient seen at bedside. Awake and alert. Afebrile. Complaining of high anxiety due to a patient disturbance down the anderson. Complaining of pain to abdomen, seems very uncomfortable. Would like to go home because she has horses outside that are not being fed. ROS: denies fever, chills, cp, sob, n/v, HA unless stated above.       [START ON 1/27/2023] vitamin D  50,000 Units Oral Weekly    lactobacillus  1 capsule Oral Daily    lisinopril  20 mg Oral Daily    hydroCHLOROthiazide  12.5 mg Oral Daily    citalopram  30 mg Oral Daily    dilTIAZem  120 mg Oral Daily    fluticasone  2 spray Nasal Daily    levothyroxine  75 mcg Oral Daily    LORazepam  1 mg Oral BID    montelukast  10 mg Oral Nightly    budesonide  250 mcg Nebulization BID    ipratropium  0.5 mg Nebulization 4x daily    arformoterol tartrate  15 mcg Nebulization BID    piperacillin-tazobactam  3,375 mg IntraVENous Q8H    rOPINIRole  1 mg Oral Nightly    sodium chloride flush  5-40 mL IntraVENous 2 times per day     loperamide, 2 mg, 4x Daily PRN  albuterol, 2.5 mg, Q6H PRN  HYDROmorphone, 1 mg, Q4H PRN   Or  HYDROmorphone, 2 mg, Q4H PRN  sodium chloride flush, 5-40 mL, PRN  sodium chloride, , PRN  ondansetron, 4 mg, Q8H PRN   Or  ondansetron, 4 mg, Q6H PRN  polyethylene glycol, 17 g, Daily PRN  acetaminophen, 650 mg, Q6H PRN   Or  acetaminophen, 650 mg, Q6H PRN  potassium chloride, 40 mEq, PRN   Or  potassium alternative oral replacement, 40 mEq, PRN   Or  potassium chloride, 10 mEq, PRN         Objective:  BP (!) 143/70   Pulse 67   Temp 97.7 °F (36.5 °C) (Oral)   Resp 18   Ht 5' 1\" (1.549 m)   Wt 178 lb 9.2 oz (81 kg)   SpO2 96%   BMI 33.74 kg/m²     General Appearance: alert and oriented to person, place and time and in no acute distress  Skin: warm and dry  Head: normocephalic and atraumatic  Eyes: pupils equal, round, and reactive to light, extraocular eye movements intact, conjunctivae normal  Neck: neck supple and non tender without mass   Pulmonary/Chest: clear to auscultation bilaterally- no wheezes, rales or rhonchi, normal air movement, no respiratory distress  Cardiovascular: normal rate, normal S1 and S2 and no carotid bruits  Abdomen: soft, + tender, non-distended, normal bowel sounds, no masses or organomegaly  Extremities: no cyanosis, no clubbing and no edema  Neurologic: no cranial nerve deficit and speech normal    Recent Labs     01/21/23  0905 01/22/23  1040 01/23/23  0634    137 135   K 3.5 3.7 3.5   CL 97* 104 102   CO2 24 25 24   BUN 10 7 4*   CREATININE 1.2* 1.1* 1.0   GLUCOSE 113* 137* 96   CALCIUM 8.4* 7.8* 8.1*       Recent Labs     01/21/23  0905 01/22/23  1040 01/23/23  0634   WBC 9.6 6.1 6.2   RBC 4.57 3.82 3.82   HGB 13.3 11.1* 11.0*   HCT 40.4 34.3 34.3   MCV 88.4 89.8 89.8   MCH 29.1 29.1 28.8   MCHC 32.9 32.4 32.1   RDW 12.7 12.7 12.8    254 218   MPV 9.8 9.7 9.8     Radiology: reviewed     Assessment:  Principal Problem:    Colitis  Resolved Problems:    * No resolved hospital problems. *    Plan:  1. Acute diarrhea - stool cultures and C diff negative. Advanced to Regular diet. Lactobacillus ordered. No plans for inpatient endoscopy at this time. Continue Zosyn for now, will switch to PO Augmentin tomorrow then discharge. Follow up outpatient with surgery for colonoscopy. 2.  Hypomagnesemia - Mg2+ low normal at 1.6. 2 gm IV Mag ordered given her frequency of stools. Repeat Mag 1.7, will give another 2 gram mag. Trend labs and treat accordingly. 3.  HTN - Continue Cardizem and Lisinopril. Monitor vitals and adjust accordingly  4. Hypothyroidism - continue meds. 5.  Restless leg syndrome - Continue requip.   Monitor vitals and adjust accordingly  6. COPD - supportive care. Continue nebs/respiratory support. 7.  Depression - Continue Celexa. Monitor vitals and adjust accordingly    CODE: FULL  DVT prophylaxis: SCDs   Discharge dipo: home when medically stable, likely tomorrow with Augmentin. 30 minutes time spent reviewing patient chart, assessing patient, discussing plan of care with patient and family, discussing plan of care with collaborating physician, and charting.      Electronically signed by DAGO Bullock NP on 1/24/2023 at 8:06 AM

## 2023-01-25 PROBLEM — I47.1 SUPRAVENTRICULAR TACHYCARDIA (HCC): Status: ACTIVE | Noted: 2023-01-25

## 2023-01-25 PROBLEM — R07.89 OTHER CHEST PAIN: Status: ACTIVE | Noted: 2023-01-25

## 2023-01-25 PROBLEM — R06.09 DOE (DYSPNEA ON EXERTION): Status: ACTIVE | Noted: 2023-01-25

## 2023-01-25 PROBLEM — I47.10 SUPRAVENTRICULAR TACHYCARDIA: Status: ACTIVE | Noted: 2023-01-25

## 2023-01-25 PROBLEM — R07.9 CHEST PAIN: Status: ACTIVE | Noted: 2023-01-25

## 2023-01-25 LAB
ALBUMIN SERPL-MCNC: 3.4 G/DL (ref 3.5–5.2)
ALP BLD-CCNC: 56 U/L (ref 35–104)
ALT SERPL-CCNC: 16 U/L (ref 0–32)
ANION GAP SERPL CALCULATED.3IONS-SCNC: 7 MMOL/L (ref 7–16)
AST SERPL-CCNC: 21 U/L (ref 0–31)
BASOPHILS ABSOLUTE: 0.05 E9/L (ref 0–0.2)
BASOPHILS RELATIVE PERCENT: 0.6 % (ref 0–2)
BILIRUB SERPL-MCNC: 0.3 MG/DL (ref 0–1.2)
BUN BLDV-MCNC: 5 MG/DL (ref 6–23)
CALCIUM SERPL-MCNC: 9 MG/DL (ref 8.6–10.2)
CHLORIDE BLD-SCNC: 103 MMOL/L (ref 98–107)
CO2: 26 MMOL/L (ref 22–29)
CREAT SERPL-MCNC: 1.1 MG/DL (ref 0.5–1)
EOSINOPHILS ABSOLUTE: 0.14 E9/L (ref 0.05–0.5)
EOSINOPHILS RELATIVE PERCENT: 1.7 % (ref 0–6)
GFR SERPL CREATININE-BSD FRML MDRD: 54 ML/MIN/1.73
GLUCOSE BLD-MCNC: 101 MG/DL (ref 74–99)
HCT VFR BLD CALC: 33.5 % (ref 34–48)
HEMOGLOBIN: 11.2 G/DL (ref 11.5–15.5)
IMMATURE GRANULOCYTES #: 0.07 E9/L
IMMATURE GRANULOCYTES %: 0.8 % (ref 0–5)
LYMPHOCYTES ABSOLUTE: 1.87 E9/L (ref 1.5–4)
LYMPHOCYTES RELATIVE PERCENT: 22.6 % (ref 20–42)
MAGNESIUM: 1.9 MG/DL (ref 1.6–2.6)
MCH RBC QN AUTO: 29.6 PG (ref 26–35)
MCHC RBC AUTO-ENTMCNC: 33.4 % (ref 32–34.5)
MCV RBC AUTO: 88.6 FL (ref 80–99.9)
MONOCYTES ABSOLUTE: 0.71 E9/L (ref 0.1–0.95)
MONOCYTES RELATIVE PERCENT: 8.6 % (ref 2–12)
NEUTROPHILS ABSOLUTE: 5.45 E9/L (ref 1.8–7.3)
NEUTROPHILS RELATIVE PERCENT: 65.7 % (ref 43–80)
PDW BLD-RTO: 13.1 FL (ref 11.5–15)
PLATELET # BLD: 263 E9/L (ref 130–450)
PMV BLD AUTO: 9.9 FL (ref 7–12)
POTASSIUM SERPL-SCNC: 3.3 MMOL/L (ref 3.5–5)
RBC # BLD: 3.78 E12/L (ref 3.5–5.5)
SODIUM BLD-SCNC: 136 MMOL/L (ref 132–146)
TOTAL PROTEIN: 5.8 G/DL (ref 6.4–8.3)
TROPONIN, HIGH SENSITIVITY: 11 NG/L (ref 0–9)
WBC # BLD: 8.3 E9/L (ref 4.5–11.5)

## 2023-01-25 PROCEDURE — 99231 SBSQ HOSP IP/OBS SF/LOW 25: CPT | Performed by: INTERNAL MEDICINE

## 2023-01-25 PROCEDURE — 2500000003 HC RX 250 WO HCPCS: Performed by: FAMILY MEDICINE

## 2023-01-25 PROCEDURE — 2580000003 HC RX 258: Performed by: INTERNAL MEDICINE

## 2023-01-25 PROCEDURE — 6360000002 HC RX W HCPCS: Performed by: STUDENT IN AN ORGANIZED HEALTH CARE EDUCATION/TRAINING PROGRAM

## 2023-01-25 PROCEDURE — 99223 1ST HOSP IP/OBS HIGH 75: CPT | Performed by: INTERNAL MEDICINE

## 2023-01-25 PROCEDURE — 6360000002 HC RX W HCPCS: Performed by: INTERNAL MEDICINE

## 2023-01-25 PROCEDURE — 6370000000 HC RX 637 (ALT 250 FOR IP): Performed by: INTERNAL MEDICINE

## 2023-01-25 PROCEDURE — 85025 COMPLETE CBC W/AUTO DIFF WBC: CPT

## 2023-01-25 PROCEDURE — APPSS60 APP SPLIT SHARED TIME 46-60 MINUTES

## 2023-01-25 PROCEDURE — 93005 ELECTROCARDIOGRAM TRACING: CPT | Performed by: FAMILY MEDICINE

## 2023-01-25 PROCEDURE — 94640 AIRWAY INHALATION TREATMENT: CPT

## 2023-01-25 PROCEDURE — 36415 COLL VENOUS BLD VENIPUNCTURE: CPT

## 2023-01-25 PROCEDURE — 84484 ASSAY OF TROPONIN QUANT: CPT

## 2023-01-25 PROCEDURE — 2580000003 HC RX 258: Performed by: STUDENT IN AN ORGANIZED HEALTH CARE EDUCATION/TRAINING PROGRAM

## 2023-01-25 PROCEDURE — 83735 ASSAY OF MAGNESIUM: CPT

## 2023-01-25 PROCEDURE — 6360000002 HC RX W HCPCS: Performed by: FAMILY MEDICINE

## 2023-01-25 PROCEDURE — 6370000000 HC RX 637 (ALT 250 FOR IP): Performed by: STUDENT IN AN ORGANIZED HEALTH CARE EDUCATION/TRAINING PROGRAM

## 2023-01-25 PROCEDURE — 6370000000 HC RX 637 (ALT 250 FOR IP): Performed by: FAMILY MEDICINE

## 2023-01-25 PROCEDURE — C9113 INJ PANTOPRAZOLE SODIUM, VIA: HCPCS | Performed by: FAMILY MEDICINE

## 2023-01-25 PROCEDURE — 2580000003 HC RX 258: Performed by: FAMILY MEDICINE

## 2023-01-25 PROCEDURE — 80053 COMPREHEN METABOLIC PANEL: CPT

## 2023-01-25 PROCEDURE — A4216 STERILE WATER/SALINE, 10 ML: HCPCS | Performed by: FAMILY MEDICINE

## 2023-01-25 PROCEDURE — 1200000000 HC SEMI PRIVATE

## 2023-01-25 RX ORDER — METOPROLOL TARTRATE 5 MG/5ML
5 INJECTION INTRAVENOUS ONCE
Status: COMPLETED | OUTPATIENT
Start: 2023-01-25 | End: 2023-01-25

## 2023-01-25 RX ORDER — NITROGLYCERIN 0.4 MG/1
0.4 TABLET SUBLINGUAL EVERY 5 MIN PRN
Status: DISCONTINUED | OUTPATIENT
Start: 2023-01-25 | End: 2023-01-26 | Stop reason: HOSPADM

## 2023-01-25 RX ORDER — AMOXICILLIN AND CLAVULANATE POTASSIUM 875; 125 MG/1; MG/1
1 TABLET, FILM COATED ORAL EVERY 12 HOURS SCHEDULED
Status: DISCONTINUED | OUTPATIENT
Start: 2023-01-25 | End: 2023-01-26 | Stop reason: HOSPADM

## 2023-01-25 RX ORDER — POTASSIUM CHLORIDE 20 MEQ/1
40 TABLET, EXTENDED RELEASE ORAL 2 TIMES DAILY WITH MEALS
Status: COMPLETED | OUTPATIENT
Start: 2023-01-25 | End: 2023-01-25

## 2023-01-25 RX ORDER — OXYCODONE HYDROCHLORIDE AND ACETAMINOPHEN 5; 325 MG/1; MG/1
1 TABLET ORAL EVERY 6 HOURS PRN
Status: DISCONTINUED | OUTPATIENT
Start: 2023-01-25 | End: 2023-01-26 | Stop reason: HOSPADM

## 2023-01-25 RX ADMIN — SODIUM CHLORIDE, PRESERVATIVE FREE 10 ML: 5 INJECTION INTRAVENOUS at 20:47

## 2023-01-25 RX ADMIN — LEVOTHYROXINE SODIUM 75 MCG: 75 TABLET ORAL at 06:26

## 2023-01-25 RX ADMIN — ONDANSETRON 4 MG: 2 INJECTION INTRAMUSCULAR; INTRAVENOUS at 16:40

## 2023-01-25 RX ADMIN — Medication 1 CAPSULE: at 10:16

## 2023-01-25 RX ADMIN — ARFORMOTEROL TARTRATE 15 MCG: 15 SOLUTION RESPIRATORY (INHALATION) at 19:27

## 2023-01-25 RX ADMIN — SODIUM CHLORIDE 40 MG: 9 INJECTION, SOLUTION INTRAMUSCULAR; INTRAVENOUS; SUBCUTANEOUS at 10:17

## 2023-01-25 RX ADMIN — LORAZEPAM 1 MG: 1 TABLET ORAL at 20:47

## 2023-01-25 RX ADMIN — BUDESONIDE 250 MCG: 0.25 SUSPENSION RESPIRATORY (INHALATION) at 12:05

## 2023-01-25 RX ADMIN — OXYCODONE AND ACETAMINOPHEN 1 TABLET: 5; 325 TABLET ORAL at 19:41

## 2023-01-25 RX ADMIN — LOPERAMIDE HYDROCHLORIDE 2 MG: 2 CAPSULE ORAL at 10:17

## 2023-01-25 RX ADMIN — FLUTICASONE PROPIONATE 2 SPRAY: 50 SPRAY, METERED NASAL at 10:17

## 2023-01-25 RX ADMIN — HYDROMORPHONE HYDROCHLORIDE 2 MG: 2 INJECTION, SOLUTION INTRAMUSCULAR; INTRAVENOUS; SUBCUTANEOUS at 08:11

## 2023-01-25 RX ADMIN — BUDESONIDE 250 MCG: 0.25 SUSPENSION RESPIRATORY (INHALATION) at 19:27

## 2023-01-25 RX ADMIN — CITALOPRAM HYDROBROMIDE 30 MG: 20 TABLET ORAL at 10:16

## 2023-01-25 RX ADMIN — MONTELUKAST SODIUM 10 MG: 10 TABLET ORAL at 20:47

## 2023-01-25 RX ADMIN — SODIUM CHLORIDE: 9 INJECTION, SOLUTION INTRAVENOUS at 10:34

## 2023-01-25 RX ADMIN — HYDROCHLOROTHIAZIDE 12.5 MG: 12.5 TABLET ORAL at 10:17

## 2023-01-25 RX ADMIN — METOPROLOL TARTRATE 5 MG: 5 INJECTION, SOLUTION INTRAVENOUS at 10:17

## 2023-01-25 RX ADMIN — AMOXICILLIN AND CLAVULANATE POTASSIUM 1 TABLET: 875; 125 TABLET, FILM COATED ORAL at 10:32

## 2023-01-25 RX ADMIN — ONDANSETRON 4 MG: 4 TABLET, ORALLY DISINTEGRATING ORAL at 21:52

## 2023-01-25 RX ADMIN — IPRATROPIUM BROMIDE 0.5 MG: 0.5 SOLUTION RESPIRATORY (INHALATION) at 19:27

## 2023-01-25 RX ADMIN — DILTIAZEM HYDROCHLORIDE 120 MG: 120 CAPSULE, COATED, EXTENDED RELEASE ORAL at 10:17

## 2023-01-25 RX ADMIN — OXYCODONE AND ACETAMINOPHEN 1 TABLET: 5; 325 TABLET ORAL at 13:04

## 2023-01-25 RX ADMIN — IPRATROPIUM BROMIDE 0.5 MG: 0.5 SOLUTION RESPIRATORY (INHALATION) at 16:20

## 2023-01-25 RX ADMIN — LORAZEPAM 1 MG: 1 TABLET ORAL at 10:16

## 2023-01-25 RX ADMIN — AMOXICILLIN AND CLAVULANATE POTASSIUM 1 TABLET: 875; 125 TABLET, FILM COATED ORAL at 20:47

## 2023-01-25 RX ADMIN — IPRATROPIUM BROMIDE 0.5 MG: 0.5 SOLUTION RESPIRATORY (INHALATION) at 12:05

## 2023-01-25 RX ADMIN — ARFORMOTEROL TARTRATE 15 MCG: 15 SOLUTION RESPIRATORY (INHALATION) at 12:05

## 2023-01-25 RX ADMIN — POTASSIUM CHLORIDE 40 MEQ: 1500 TABLET, EXTENDED RELEASE ORAL at 10:19

## 2023-01-25 RX ADMIN — HYDROMORPHONE HYDROCHLORIDE 1 MG: 1 INJECTION, SOLUTION INTRAMUSCULAR; INTRAVENOUS; SUBCUTANEOUS at 04:08

## 2023-01-25 RX ADMIN — PIPERACILLIN AND TAZOBACTAM 3375 MG: 3; .375 INJECTION, POWDER, FOR SOLUTION INTRAVENOUS at 06:27

## 2023-01-25 RX ADMIN — ROPINIROLE HYDROCHLORIDE 1 MG: 1 TABLET, FILM COATED ORAL at 20:47

## 2023-01-25 RX ADMIN — SODIUM CHLORIDE, PRESERVATIVE FREE 10 ML: 5 INJECTION INTRAVENOUS at 10:21

## 2023-01-25 RX ADMIN — ONDANSETRON 4 MG: 2 INJECTION INTRAMUSCULAR; INTRAVENOUS at 08:11

## 2023-01-25 RX ADMIN — POTASSIUM CHLORIDE 40 MEQ: 1500 TABLET, EXTENDED RELEASE ORAL at 16:40

## 2023-01-25 RX ADMIN — LISINOPRIL 20 MG: 20 TABLET ORAL at 10:17

## 2023-01-25 ASSESSMENT — PAIN SCALES - GENERAL
PAINLEVEL_OUTOF10: 8
PAINLEVEL_OUTOF10: 7
PAINLEVEL_OUTOF10: 7
PAINLEVEL_OUTOF10: 8

## 2023-01-25 ASSESSMENT — PAIN DESCRIPTION - DESCRIPTORS
DESCRIPTORS: STABBING;CRAMPING
DESCRIPTORS: ACHING;SORE;TENDER
DESCRIPTORS: GNAWING
DESCRIPTORS: ACHING

## 2023-01-25 ASSESSMENT — PAIN DESCRIPTION - ORIENTATION
ORIENTATION: MID
ORIENTATION: LEFT
ORIENTATION: MID
ORIENTATION: MID

## 2023-01-25 ASSESSMENT — PAIN DESCRIPTION - LOCATION
LOCATION: ABDOMEN
LOCATION: ABDOMEN;CHEST

## 2023-01-25 ASSESSMENT — PAIN - FUNCTIONAL ASSESSMENT: PAIN_FUNCTIONAL_ASSESSMENT: ACTIVITIES ARE NOT PREVENTED

## 2023-01-25 NOTE — PLAN OF CARE
Problem: Pain  Goal: Verbalizes/displays adequate comfort level or baseline comfort level  1/25/2023 0054 by Ana Ramos RN  Outcome: Progressing     Problem: Gastrointestinal - Adult  Goal: Minimal or absence of nausea and vomiting  1/25/2023 0054 by Ana Ramos RN  Outcome: Progressing     Problem: Gastrointestinal - Adult  Goal: Maintains or returns to baseline bowel function  1/25/2023 0054 by Ana Ramos RN  Outcome: Not Progressing     Problem: Gastrointestinal - Adult  Goal: Maintains or returns to baseline bowel function  1/25/2023 0054 by Ana Ramos RN  Outcome: Not Progressing  1/24/2023 1326 by Kristie Lee RN  Outcome: Progressing

## 2023-01-25 NOTE — PROGRESS NOTES
[facility] Progress Note    Admitting Date and Time: 1/20/2023  7:57 PM  Admit Dx: Colitis [K52.9]  GALLEGOS (dyspnea on exertion) [R06.09]    Subjective:  Patient is being followed for Colitis [K52.9]  GALLEGOS (dyspnea on exertion) [R06.09]     Seen at bedside. Nursing present. She is complaining of cramping pain to abdomen with diarrhea, yellow mucus. Had an episode of chest tightness possibly reflux related. Was given Lopressor x 1 for hypertension. Asked for increased pain medication. Doesn't feel as anxious today. ROS: denies fever, chills, cp, sob, n/v, HA unless stated above.       pantoprazole (PROTONIX) 40 mg injection  40 mg IntraVENous Daily    metoprolol  5 mg IntraVENous Once    LORazepam  0.5 mg Oral Once    [START ON 1/27/2023] vitamin D  50,000 Units Oral Weekly    lactobacillus  1 capsule Oral Daily    lisinopril  20 mg Oral Daily    hydroCHLOROthiazide  12.5 mg Oral Daily    citalopram  30 mg Oral Daily    dilTIAZem  120 mg Oral Daily    fluticasone  2 spray Nasal Daily    levothyroxine  75 mcg Oral Daily    LORazepam  1 mg Oral BID    montelukast  10 mg Oral Nightly    budesonide  250 mcg Nebulization BID    ipratropium  0.5 mg Nebulization 4x daily    arformoterol tartrate  15 mcg Nebulization BID    piperacillin-tazobactam  3,375 mg IntraVENous Q8H    rOPINIRole  1 mg Oral Nightly    sodium chloride flush  5-40 mL IntraVENous 2 times per day     nitroGLYCERIN, 0.4 mg, Q5 Min PRN  loperamide, 2 mg, 4x Daily PRN  albuterol, 2.5 mg, Q6H PRN  HYDROmorphone, 1 mg, Q4H PRN   Or  HYDROmorphone, 2 mg, Q4H PRN  sodium chloride flush, 5-40 mL, PRN  sodium chloride, , PRN  ondansetron, 4 mg, Q8H PRN   Or  ondansetron, 4 mg, Q6H PRN  polyethylene glycol, 17 g, Daily PRN  acetaminophen, 650 mg, Q6H PRN   Or  acetaminophen, 650 mg, Q6H PRN  potassium chloride, 40 mEq, PRN   Or  potassium alternative oral replacement, 40 mEq, PRN   Or  potassium chloride, 10 mEq, PRN       Objective:  BP (!) 186/75   Pulse 83   Temp 98.2 °F (36.8 °C) (Oral)   Resp 16   Ht 5' 1\" (1.549 m)   Wt 184 lb (83.5 kg)   SpO2 93%   BMI 34.77 kg/m²     General Appearance: alert and oriented to person, place and time and in no acute distress  Skin: warm and dry  Head: normocephalic and atraumatic  Eyes: pupils equal, round, and reactive to light, extraocular eye movements intact, conjunctivae normal  Neck: neck supple and non tender without mass   Pulmonary/Chest: clear to auscultation bilaterally- no wheezes, rales or rhonchi, normal air movement, no respiratory distress  Cardiovascular: normal rate, normal S1 and S2 and no carotid bruits  Abdomen: soft, + tender, non-distended, normal bowel sounds, no masses or organomegaly  Extremities: no cyanosis, no clubbing and no edema  Neurologic: no cranial nerve deficit and speech normal    Recent Labs     01/23/23  0634 01/24/23  1320 01/25/23  0255    137 136   K 3.5 3.5 3.3*    102 103   CO2 24 27 26   BUN 4* 4* 5*   CREATININE 1.0 1.0 1.1*   GLUCOSE 96 106* 101*   CALCIUM 8.1* 8.9 9.0       Recent Labs     01/23/23  0634 01/24/23  1320 01/25/23  0255   WBC 6.2 7.3 8.3   RBC 3.82 3.82 3.78   HGB 11.0* 11.1* 11.2*   HCT 34.3 33.7* 33.5*   MCV 89.8 88.2 88.6   MCH 28.8 29.1 29.6   MCHC 32.1 32.9 33.4   RDW 12.8 13.0 13.1    258 263   MPV 9.8 9.7 9.9     Radiology: reviewed     Assessment:  Principal Problem:    Colitis  Resolved Problems:    * No resolved hospital problems. *    Plan:  1. Acute diarrhea - stool cultures and C diff negative. Advanced to Regular diet. Lactobacillus ordered. No plans for inpatient endoscopy at this time. Zosyn changed to Augmentin for pending discharge. Follow up outpatient with surgery for colonoscopy. Pain management needed prior to discharge- long discussion had with patient regarding this. We will change to percocet Q6 PRN which should be used prior to giving diluadid.    2.  Hypomagnesemia - Mg2+ low normal at 1.6. 2 gm IV Mag ordered given her frequency of stools. Repeat Mag 1.7, given 2 gram mag on 1/24 with repeat now 1.9. Trend labs and treat accordingly. 3.  HTN - Continue Cardizem and Lisinopril. Monitor vitals and adjust accordingly. Lopressor x 1 on 1/25 due to hypertension with episode of emesis. 4.  Hypothyroidism - continue meds. 5.  Restless leg syndrome - Continue requip. Monitor vitals and adjust accordingly  6. COPD - supportive care. Continue nebs/respiratory support. 7.  Depression/Anxiety - Continue Celexa and Ativan. Monitor vitals and adjust accordingly  8. Hypokalemia - K 3.3, replete and continue to monitor. CODE: FULL  DVT prophylaxis: SCDs   Discharge dipo: home when medically stable - likely tomorrow    25 minutes time spent reviewing patient chart, assessing patient, discussing plan of care with patient and family, discussing plan of care with collaborating physician, and charting.      Electronically signed by DAGO Barrera NP on 1/25/2023 at 8:37 AM

## 2023-01-25 NOTE — CONSULTS
Inpatient Cardiology Consultation      Reason for Consult:  Chest pain    Consulting Physician: Dr Catalino Pugh    Requesting Physician:  Mk Renteria MD    Date of Consultation: 1/25/2023    HISTORY OF PRESENT ILLNESS:   Patient is a 78-year-old female is known to [facility] cardiology through Dr. David Quintero. She was last seen outpatient 10/27/2022 for palpitations and shortness of breath. A 14-day Holter monitor, coronary CTA were ordered. Medications were continued. Zio patch showed PVCs and 2 episode of SVT. Norvasc was discontinued and patient was started on Cardizem. Patient initially presented for coronary CTA but CT machine shutdown assisted through procedure. Patient was not rescheduled due to high calcium score per Dr. David Quintero. PMHx: COPD, hypertension, hyperlipidemia intolerant to statins due to myalgia, hypothyroidism on HRT, CKD stage III, GERD, ischemic colitis, chronic pain syndrome of neck and lower back s/p industrial accident, depression, former tobacco abuse quit 5 years ago    TTE 9/2022: EF 60%. Indeterminate diastolic. PASP 24 mmHg. HPI:  Patient presented to ER 1/20/2023 at 6:07 PM for vomiting that started 2 days prior to arrival with development of abdominal pain and diarrhea with BRRB. Patient reports vomiting resolved prior to arrival to ER. Patient went to PCP earlier that day was sent in for symptoms. Patient admitted for colitis. General surgery saw patient and recommended broad-spectrum antibiotics and colonoscopy most likely outpatient. Patient was found to be hypomagnesemic and supplemented with 2 g IV. C. difficile negative. Cardiology consulted for chest pain.   Labs: Potassium 3.3, BUN 6, creatinine 1.1, magnesium 1.9, glucose 101, serum calcium 9.0, total protein 5.8, albumin 3.4, WBC 8.3, H&H 11.2/33.5, platelet 099, UA negative  CT abdomen: Minimal pericolic edema along the proximal descending colon may be due to colitis or acute, uncomplicated diverticulitis. Upon assessment today 1/25/2023 patient is lying semi-Mata in hospital bed on room air. Patient is alert and oriented, speaking full sentences, is complaining of 4/10 reproducible substernal chest pain at this time. The patient reports she still having continued left upper and lower quadrant abdominal pain that radiates to her umbilicus with episodic nausea and continued diarrhea. The patient reports this morning after eating breakfast she had a bout of diarrhea and after returning back to bed she sat down and had acute onset of substernal chest pain with radiation straight through to her back. She had accompanying symptoms of shortness of breath, nausea, and regurgitation of bile with esophageal burning. She reports at that time the pain is 8/10. She reports the pain and symptoms lasted for about 45 minutes until nursing gave the patient IV medicine for nausea and morphine for pain at that time the pain went down by half and is currently 4/10. Per patient she was scheduled for repeat CTA coronary 1/18/2023 but had to cancel due to acute onset of GI distress. She reports she is rescheduled sometime in February for CTA coronary outpatient. Physical exam is unremarkable with diminished lung sounds and some minor pedal swelling. She is not on a telemetry floor at this time, but EKG repeated today is similar to that on admission with no acute ischemic changes. Patient reports she is compliant with medications. Most recent VS 98.2, 16 respirations, 59 pulse, 162/73, 93% room air. Please note: past medical records were reviewed per electronic medical record (EMR) - see detailed reports under Past Medical/ Surgical History.    Past Medical History:    COPD  Hypertension  Hyperlipidemia intolerant to statins due to myalgias  Hypothyroidism on HRT  CKD stage III  GERD  Ischemic colitis  Chronic pain syndrome of neck and lower back s/p industrial accident  Depression  Former tobacco abuse, quit 5 years ago  Hiatal hernia  IBS  Osteopenia  Overactive bladder  RLS  Vitamin D deficiency    Cardiac testing:  TTE 9/2022: EF 60%. Indeterminate diastolic. PASP 24 mmHg. 14-day ZIO 11/2022: Underlying sinus rhythm. 2 episodes of SVT lasting 12 beats maximum rate of 160. Occasional PAC/PVCs less than 1%. Patient symptoms correlated with PVCs and SVT. TTE 2/2013: EF 55%. Stage I DD. Mild TR.  RVSP 37-42 mmHg. Mild TR. Past Surgical History:    Past Surgical History:   Procedure Laterality Date    APPENDECTOMY N/A     CARDIOVASCULAR STRESS TEST N/A 02/2006    Dr. Nile Brambila; EF=70%    CHOLECYSTECTOMY N/A     COLONOSCOPY N/A 02/20/2013    Dr. Antwon Yanez; ischemic colitis involving the descending and sigmoid colon, diverticulosis of the sigmoid colon    COLONOSCOPY N/A 05/12/2008    Dr. Jeff Little; internal hemorrhoids and spastic colon    ECHO COMPLETE N/A 02/19/2013    Dr. Alfred Marte; EF >42%-ALZIR I diastolic dysfunction, with mild TR and mild pulm HTN    TONSILLECTOMY Bilateral     UPPER GASTROINTESTINAL ENDOSCOPY N/A 02/19/2013    Dr. Antwon Yanez; gastritis    UPPER GASTROINTESTINAL ENDOSCOPY N/A 11/15/2011       Medications Prior to admit:  Prior to Admission medications    Medication Sig Start Date End Date Taking?  Authorizing Provider   metroNIDAZOLE (FLAGYL) 500 MG tablet Take 1 tablet by mouth 3 times daily for 10 days 1/24/23 2/3/23 Yes Julian Trevino,    cefdinir (OMNICEF) 300 MG capsule Take 1 capsule by mouth 2 times daily for 7 days 1/23/23 1/30/23 Yes Thony Schwartz MD   alendronate (FOSAMAX) 70 MG tablet Take 70 mg by mouth every 7 days sundays   Yes Historical Provider, MD   Coenzyme Q10 (CO Q-10) 100 MG CAPS Take by mouth daily   Yes Historical Provider, MD   fluticasone-umeclidin-vilant (TRELEGY ELLIPTA) 098-78.6-30 MCG/ACT AEPB inhaler Inhale 1 puff into the lungs daily 1/10/23   Beth Campos,    dilTIAZem (CARDIZEM CD) 120 MG extended release capsule Take 1 capsule by mouth daily 11/18/22 Lai Villa MD   levothyroxine (SYNTHROID) 75 MCG tablet TAKE ONE TABLET BY MOUTH IN THE MORNING 10/31/22   Wilberto, DO   Cholecalciferol (VITAMIN D3) 1.25 MG (27480 UT) CAPS Take 1 capsule by mouth once a week 9/14/22   [facility] DO Iain   montelukast (SINGULAIR) 10 MG tablet Take 1 tablet by mouth nightly 9/14/22   [facility] DO Iain   tiZANidine (ZANAFLEX) 2 MG tablet TAKE ONE TABLET BY MOUTH EVERY 12 HOURS AS NEEDED FOR MUSCLE SPASM OF THE NECK FOR 30 DAYS 9/9/22   Historical Provider, MD   benazepril-hydrochlorthiazide (LOTENSIN HCT) 20-12.5 MG per tablet TAKE ONE TABLET BY MOUTH DAILY 8/25/22   [facility] Iain, DO   rOPINIRole (REQUIP) 1 MG tablet TAKE ONE TABLET BY MOUTH NIGHTLY 8/8/22 1/21/23  Fiorella Timmons, DO   PROVENTIL  (90 Base) MCG/ACT inhaler Inhale 2 puffs into the lungs every 6 hours as needed for Wheezing (wheezing) Wants the proventil. Will pay out of pocket. 8/8/22   Adriane Campos, DO   oxyCODONE-acetaminophen (PERCOCET)  MG per tablet Take 1 tablet by mouth every 8 hours as needed. 5/16/22   Historical Provider, MD   Citalopram Hydrobromide 30 MG CAPS Take 30 mg by mouth daily    Historical Provider, MD   traZODone (DESYREL) 100 MG tablet Take 100 mg by mouth every evening 1/27/21   Historical Provider, MD   fluticasone (FLONASE) 50 MCG/ACT nasal spray 2 sprays by Nasal route daily 6/6/18   Ron Ventura MD   TL STANLEY RX 2.2-25-1 MG TABS tablet Take 1 tablet by mouth daily 4/18/18   Historical Provider, MD   lidocaine (LIDODERM) 5 % Place 1 patch onto the skin 2 times daily 12 hours on, 12 hours off. Patient not taking: Reported on 1/21/2023 4/11/17   Ron Ventura MD   omeprazole (PRILOSEC) 40 MG capsule Take 1 capsule by mouth daily. 2/23/13   Ron Ventura MD   LORazepam (ATIVAN) 1 MG tablet Take 1 mg by mouth in the morning and at bedtime.      Historical Provider, MD       Current Medications:    Current Facility-Administered Medications: pantoprazole (PROTONIX) 40 mg in sodium chloride (PF) 0.9 % 10 mL injection, 40 mg, IntraVENous, Daily  nitroGLYCERIN (NITROSTAT) SL tablet 0.4 mg, 0.4 mg, SubLINGual, Q5 Min PRN  potassium chloride (KLOR-CON M) extended release tablet 40 mEq, 40 mEq, Oral, BID WC  amoxicillin-clavulanate (AUGMENTIN) 875-125 MG per tablet 1 tablet, 1 tablet, Oral, 2 times per day  LORazepam (ATIVAN) tablet 0.5 mg, 0.5 mg, Oral, Once  [START ON 1/27/2023] vitamin D (ERGOCALCIFEROL) capsule 50,000 Units, 50,000 Units, Oral, Weekly  lactobacillus (CULTURELLE) capsule 1 capsule, 1 capsule, Oral, Daily  lisinopril (PRINIVIL;ZESTRIL) tablet 20 mg, 20 mg, Oral, Daily  hydroCHLOROthiazide (HYDRODIURIL) tablet 12.5 mg, 12.5 mg, Oral, Daily  loperamide (IMODIUM) capsule 2 mg, 2 mg, Oral, 4x Daily PRN  citalopram (CELEXA) tablet 30 mg, 30 mg, Oral, Daily  dilTIAZem (CARDIZEM CD) extended release capsule 120 mg, 120 mg, Oral, Daily  fluticasone (FLONASE) 50 MCG/ACT nasal spray 2 spray, 2 spray, Nasal, Daily  levothyroxine (SYNTHROID) tablet 75 mcg, 75 mcg, Oral, Daily  LORazepam (ATIVAN) tablet 1 mg, 1 mg, Oral, BID  montelukast (SINGULAIR) tablet 10 mg, 10 mg, Oral, Nightly  budesonide (PULMICORT) nebulizer suspension 250 mcg, 250 mcg, Nebulization, BID  ipratropium (ATROVENT) 0.02 % nebulizer solution 0.5 mg, 0.5 mg, Nebulization, 4x daily  arformoterol tartrate (BROVANA) nebulizer solution 15 mcg, 15 mcg, Nebulization, BID  albuterol (PROVENTIL) nebulizer solution 2.5 mg, 2.5 mg, Nebulization, Q6H PRN  0.9 % sodium chloride infusion, , IntraVENous, Continuous  rOPINIRole (REQUIP) tablet 1 mg, 1 mg, Oral, Nightly  HYDROmorphone (DILAUDID) injection 1 mg, 1 mg, IntraVENous, Q4H PRN **OR** HYDROmorphone (DILAUDID) injection 2 mg, 2 mg, IntraVENous, Q4H PRN  sodium chloride flush 0.9 % injection 5-40 mL, 5-40 mL, IntraVENous, 2 times per day  sodium chloride flush 0.9 % injection 5-40 mL, 5-40 mL, IntraVENous, PRN  0.9 % sodium chloride infusion, , IntraVENous, PRN  ondansetron (ZOFRAN-ODT) disintegrating tablet 4 mg, 4 mg, Oral, Q8H PRN **OR** ondansetron (ZOFRAN) injection 4 mg, 4 mg, IntraVENous, Q6H PRN  polyethylene glycol (GLYCOLAX) packet 17 g, 17 g, Oral, Daily PRN  acetaminophen (TYLENOL) tablet 650 mg, 650 mg, Oral, Q6H PRN **OR** acetaminophen (TYLENOL) suppository 650 mg, 650 mg, Rectal, Q6H PRN  potassium chloride (KLOR-CON M) extended release tablet 40 mEq, 40 mEq, Oral, PRN **OR** potassium bicarb-citric acid (EFFER-K) effervescent tablet 40 mEq, 40 mEq, Oral, PRN **OR** potassium chloride 10 mEq/100 mL IVPB (Peripheral Line), 10 mEq, IntraVENous, PRN    Allergies:  Celebrex [celecoxib], Simvastatin, Avelox [moxifloxacin hcl in nacl], Biaxin [clarithromycin], Clarithromycin, Desvenlafaxine, Lipitor [atorvastatin], Lyrica [pregabalin], Tetracyclines & related, Zithromax [azithromycin], Sulfa antibiotics, and Vioxx [rofecoxib]    Social History:    Social History     Socioeconomic History    Marital status:      Spouse name: Not on file    Number of children: Not on file    Years of education: Not on file    Highest education level: Not on file   Occupational History    Not on file   Tobacco Use    Smoking status: Former     Packs/day: 1.00     Years: 45.00     Pack years: 45.00     Types: Cigarettes     Start date: 0     Quit date: 2020     Years since quitting: 3.0    Smokeless tobacco: Never   Vaping Use    Vaping Use: Former    Substances: Nicotine   Substance and Sexual Activity    Alcohol use: No    Drug use: Never    Sexual activity: Not Currently   Other Topics Concern    Not on file   Social History Narrative    Was disabled at work due to a fall. Worked at [employer] in [place], [place]. Currently on total disability.       Social Determinants of Health     Financial Resource Strain: Low Risk     Difficulty of Paying Living Expenses: Not very hard   Food Insecurity: No Food Insecurity    Worried About Running Out of Food in the Last Year: Never true    Ran Out of Food in the Last Year: Never true   Transportation Needs: Not on file   Physical Activity: Not on file   Stress: Not on file   Social Connections: Not on file   Intimate Partner Violence: Not on file   Housing Stability: Not on file       Family History:   Family History   Problem Relation Age of Onset    High Blood Pressure Mother     Thyroid Disease Mother     Hypertension Mother     Lung Cancer Father         with metastasis to his brain    Heart Disease Brother     Pacemaker Brother     COPD Brother     Breast Cancer Paternal Aunt     Breast Cancer Paternal Aunt     Obesity Maternal Grandmother     Heart Disease Paternal Grandmother          REVIEW OF SYSTEMS:     Constitutional: Denies fevers, chills, night sweats, and fatigue  HEENT: Denies headaches, nose bleeds, and blurred vision,oral pain, abscess or lesion. Musculoskeletal: Denies falls, pain to BLE with ambulation. Pedal edema  Neurological: Denies dizziness and lightheadedness, numbness and tingling  Cardiovascular: Denies palpitations, and feelings of heart racing. Substernal chest pain with radiation to back  Respiratory: Denies orthopnea and PND, GALLEGOS. Resolved SOB during episodes of chest pain  Gastrointestinal: Denies heartburn, constipation, black/bloody, and tarry stools. Resolved emesis and bright red rectal bleeding. Continued diarrhea and diffuse abdominal pain  Genitourinary: Denies dysuria and hematuria  Hematologic: Denies excessive bruising or bleeding  Lymphatic: Denies lumps and bumps to neck, axilla, breast, and groin  Endocrine: Denies excessive thirst. Denies intolerance to hot and cold  GYN: Postmenopausal state; Denies vaginal bleeding. Psychiatric: Denies anxiety and depression.     PHYSICAL EXAM:   BP (!) 186/75   Pulse 83   Temp 98.2 °F (36.8 °C) (Oral)   Resp 16   Ht 5' 1\" (1.549 m)   Wt 184 lb (83.5 kg)   SpO2 93%   BMI 34.77 kg/m²   CONST:  Well developed, well nourished 50-year-old  female who appears stated age. Awake, alert, cooperative, no apparent distress  HEENT:   Head- Normocephalic, atraumatic   Eyes- Conjunctivae pink, anicteric  Throat- Oral mucosa pink and moist  Neck-  No stridor, trachea midline, no jugular venous distention. No adenopathy   CHEST: Chest symmetrical and non-tender to palpation. No accessory muscle use or intercostal retractions  RESPIRATORY: Lung sounds -diminished bilateral lower lobes  CARDIOVASCULAR:     No carotid bruit  Heart Inspection- shows no noted pulsations  Heart Palpation- no heaves or thrills; PMI is non-displaced   Heart Ausculation- Regular rate and rhythm, no murmur. No s3, s4 or rub   PV: Trace bilateral pedal edema. No varicosities. Pedal pulses palpable, no clubbing or cyanosis   ABDOMEN: Soft, tender to light palpation. Bowel sounds present. No palpable masses no organomegaly; no abdominal bruit  MS: Good muscle strength and tone. No atrophy or abnormal movements. : Deferred  SKIN: Warm and dry no statis dermatitis or ulcers   NEURO / PSYCH: Oriented to person, place and time. Speech clear and appropriate. Follows all commands. Pleasant affect     DATA:    ECG: Normal sinus rhythm, vent rate 67, VA interval 166, QRS duration 82, QTc 412.   Tele strips: Not on telemetry  Diagnostic:      Intake/Output Summary (Last 24 hours) at 1/25/2023 1107  Last data filed at 1/25/2023 4655  Gross per 24 hour   Intake 1405 ml   Output --   Net 1405 ml       Labs:   CBC:   Recent Labs     01/24/23  1320 01/25/23  0255   WBC 7.3 8.3   HGB 11.1* 11.2*   HCT 33.7* 33.5*    263     BMP:   Recent Labs     01/24/23  1320 01/25/23  0255    136   K 3.5 3.3*   CO2 27 26   BUN 4* 5*   CREATININE 1.0 1.1*   LABGLOM >60 54   CALCIUM 8.9 9.0     Mag:   Recent Labs     01/24/23  1320 01/25/23  0255   MG 1.7 1.9       HgA1c:   Lab Results   Component Value Date    LABA1C 5.6 01/17/2023     FASTING LIPID PANEL:  Lab Results Component Value Date/Time    CHOL 234 01/17/2023 02:44 PM    HDL 59 01/17/2023 02:44 PM    LDLCALC 153 01/17/2023 02:44 PM    TRIG 110 01/17/2023 02:44 PM     LIVER PROFILE:  Recent Labs     01/23/23  0634 01/25/23  0255   AST 26 21   ALT 17 16   LABALBU 3.4* 3.4*      Latest Reference Range & Units 1/25/23 12:30   Troponin, High Sensitivity 0 - 9 ng/L 11 (H)   (H): Data is abnormally high    CT scan:  Impression   Minimal pericolic edema along the proximal descending colon may be due to   COLITIS OR ACUTE, UNCOMPLICATED DIVERTICULITIS. Assessment and plan to follow as per Dr Aria Watson. Electronically signed by DAGO Li CNP on 1/25/2023 at 11:07 AM      The above documentation has been prepared under my direction and personally reviewed by me in its entirety. I confirm that the note above accurately reflects all work, treatment, procedures, and medical decision making performed by me. The patient's history was independently obtained. The patient was independently examined. Electrocardiogram, prior and present cardiovascular assessment, and laboratory studies were reviewed. The patient is a 39-year-old white female known to [address] [place] [address] with primary cardiovascular care provided by Steph Sierra.   She has a known history of hypertension, hyperlipidemia, chronic obstructive lung disease, chronic kidney disease, a prior history of ischemic colitis and chronic pain syndrome and was evaluated for exertional dyspnea and palpitations with an echocardiogram of September, 2022 demonstrating evidence of a normal-sized left ventricular chamber with normal left ventricular systolic function no evidence significant valvular abnormalities and arrhythmia monitoring demonstrating predominant sinus rhythm with a single 12 beat episode of paroxysmal supraventricular tachycardia and heart rates of approximately 160 bpm.  Based on her ischemic risk profile, a coronary contrast CT angiogram was recommended but on the basis of coronary artery calcification, angiography was not completed. Her present hospitalization was prompted by the development of left lower quadrant abdominal discomfort and hematochezia in addition to vomiting with initial assessment suggesting the presence of a recurrence of her ischemic colitis and management largely acted by the general surgical service. A resting electrocardiogram obtained at time of hospitalization reviewed at time of assessment demonstrated evidence sinus rhythm and was unremarkable time of hospitalization no cardiovascular symptoms were noted. On the day of evaluation, she noted a localized pressure-like discomfort of her lower sternum/upper epigastrium radiating to her spine without additional radiation or significant associated ischemic equivalents with ongoing discomfort at time of evaluation and reproducibility of discomfort, albeit significantly diminished in intensity following narcotic analgesic administration. An electrocardiogram in the face of her discomfort and again reviewed demonstrated evidence sinus rhythm with an incomplete right bundle branch block conduction pattern with no additional significant changes from her initial tracing. No high-sensitivity troponin levels were obtained initially with a high sensitive troponin level following 6 hours of discomfort in conjunction with her evaluation being unremarkable. She relates no additional symptoms otherwise suggestive of decompensated left ventricular systolic dysfunction or volume overload nor those of an arrhythmia related nature. At the time of evaluation, her medications and allergies were reviewed as well as that of her past medical history and review of systems as documented. On examination, she appears in no discomfort and as noted above her ongoing chest discomfort while mild intensity is reproducible to palpation. She is hemodynamically stable with vital signs as documented. Jugular venous pressure appears normal with no carotid bruits. Lung fields are clear to auscultation with distant breath sounds throughout all lung fields. Cardiac examination is notable for a regular rate and rhythm with no gallop rhythm or cardiac murmur. A benign abdominal examination is present and no peripheral edema present. Diagnostic Assessment and Plan: On a clinical basis, the patient presents with a prolonged episode of ongoing and reproducible chest discomfort in the absence of electrocardiographic abnormalities and with unremarkable high-sensitivity troponin levels all suggestive of a noncardiovascular origin. Presently, in spite of her risk profile and the presence of coronary artery calcification, until stabilization of her acute illness occurs no additional cardiovascular evaluation is recommended with consideration of the performance of a gated vasodilator myocardial perfusion imaging study on an outpatient basis. Ongoing appropriate lifestyle modification to achieve weight reduction will benefit diastolic cardiac performance as well as reducing her risk of the development of obstructive sleep apnea with associated adverse cardiovascular effects. Continued aggressive risk factor modification of blood pressure and serum lipids will remain essential to reducing risk of future atherosclerotic development. We will further evaluate her during hospitalization should additional cardiovascular difficulties or concerns arise with follow-up with her primary cardiologist, Erika Mayes as previously recommended. I have participated in a substantive portion of the present encounter inclusive of a component of independent history and examination in addition to that of medical decision making regarding patient care. Thank you for allowing me to participate in your patient's care. Please feel free to contact me if you have any questions or concerns. Lala Siddiqui.  Amador Sparks, [address] [place] [address] Cardiology

## 2023-01-25 NOTE — PLAN OF CARE
Problem: Pain  Goal: Verbalizes/displays adequate comfort level or baseline comfort level  Outcome: Progressing     Problem: Safety - Adult  Goal: Free from fall injury  Outcome: Progressing     Problem: Respiratory - Adult  Goal: Achieves optimal ventilation and oxygenation  Outcome: Progressing     Problem: Musculoskeletal - Adult  Goal: Return mobility to safest level of function  Outcome: Progressing     Problem: Gastrointestinal - Adult  Goal: Minimal or absence of nausea and vomiting  Outcome: Progressing  Goal: Maintains or returns to baseline bowel function  Outcome: Progressing     Problem: Genitourinary - Adult  Goal: Absence of urinary retention  Outcome: Progressing

## 2023-01-25 NOTE — PATIENT CARE CONFERENCE
[facility] Quality Flow/Interdisciplinary Rounds Progress Note        Quality Flow Rounds held on January 25, 2023    Disciplines Attending:  Bedside Nurse, , , and Nursing Unit Leadership    Flor Dallas was admitted on 1/20/2023  7:57 PM    Anticipated Discharge Date:       Disposition:    Billy Score:  Billy Scale Score: 21    Readmission Risk              Risk of Unplanned Readmission:  14           Discussed patient goal for the day, patient clinical progression, and barriers to discharge.   The following Goal(s) of the Day/Commitment(s) have been identified:  Diagnostics - Report Results      David Ramirez RN  January 25, 2023

## 2023-01-26 VITALS
HEART RATE: 71 BPM | DIASTOLIC BLOOD PRESSURE: 70 MMHG | TEMPERATURE: 98.2 F | OXYGEN SATURATION: 92 % | WEIGHT: 159 LBS | SYSTOLIC BLOOD PRESSURE: 162 MMHG | BODY MASS INDEX: 30.02 KG/M2 | HEIGHT: 61 IN | RESPIRATION RATE: 16 BRPM

## 2023-01-26 DIAGNOSIS — G25.81 RESTLESS LEG SYNDROME: ICD-10-CM

## 2023-01-26 LAB
ALBUMIN SERPL-MCNC: 4 G/DL (ref 3.5–5.2)
ALP BLD-CCNC: 59 U/L (ref 35–104)
ALT SERPL-CCNC: 27 U/L (ref 0–32)
ANION GAP SERPL CALCULATED.3IONS-SCNC: 11 MMOL/L (ref 7–16)
AST SERPL-CCNC: 35 U/L (ref 0–31)
BASOPHILS ABSOLUTE: 0.04 E9/L (ref 0–0.2)
BASOPHILS RELATIVE PERCENT: 0.5 % (ref 0–2)
BILIRUB SERPL-MCNC: 0.4 MG/DL (ref 0–1.2)
BUN BLDV-MCNC: 5 MG/DL (ref 6–23)
CALCIUM SERPL-MCNC: 9.5 MG/DL (ref 8.6–10.2)
CHLORIDE BLD-SCNC: 99 MMOL/L (ref 98–107)
CO2: 25 MMOL/L (ref 22–29)
CREAT SERPL-MCNC: 1.1 MG/DL (ref 0.5–1)
EKG ATRIAL RATE: 67 BPM
EKG P AXIS: 77 DEGREES
EKG P-R INTERVAL: 166 MS
EKG Q-T INTERVAL: 390 MS
EKG QRS DURATION: 82 MS
EKG QTC CALCULATION (BAZETT): 412 MS
EKG R AXIS: 48 DEGREES
EKG T AXIS: 48 DEGREES
EKG VENTRICULAR RATE: 67 BPM
EOSINOPHILS ABSOLUTE: 0.18 E9/L (ref 0.05–0.5)
EOSINOPHILS RELATIVE PERCENT: 2.1 % (ref 0–6)
GFR SERPL CREATININE-BSD FRML MDRD: 54 ML/MIN/1.73
GLUCOSE BLD-MCNC: 104 MG/DL (ref 74–99)
HCT VFR BLD CALC: 36.6 % (ref 34–48)
HEMOGLOBIN: 12.1 G/DL (ref 11.5–15.5)
IMMATURE GRANULOCYTES #: 0.14 E9/L
IMMATURE GRANULOCYTES %: 1.6 % (ref 0–5)
LYMPHOCYTES ABSOLUTE: 1.51 E9/L (ref 1.5–4)
LYMPHOCYTES RELATIVE PERCENT: 17.8 % (ref 20–42)
MCH RBC QN AUTO: 29.2 PG (ref 26–35)
MCHC RBC AUTO-ENTMCNC: 33.1 % (ref 32–34.5)
MCV RBC AUTO: 88.4 FL (ref 80–99.9)
MONOCYTES ABSOLUTE: 0.66 E9/L (ref 0.1–0.95)
MONOCYTES RELATIVE PERCENT: 7.8 % (ref 2–12)
NEUTROPHILS ABSOLUTE: 5.97 E9/L (ref 1.8–7.3)
NEUTROPHILS RELATIVE PERCENT: 70.2 % (ref 43–80)
PDW BLD-RTO: 13.1 FL (ref 11.5–15)
PLATELET # BLD: 297 E9/L (ref 130–450)
PMV BLD AUTO: 10.1 FL (ref 7–12)
POTASSIUM SERPL-SCNC: 4.4 MMOL/L (ref 3.5–5)
RBC # BLD: 4.14 E12/L (ref 3.5–5.5)
SODIUM BLD-SCNC: 135 MMOL/L (ref 132–146)
TOTAL PROTEIN: 6.7 G/DL (ref 6.4–8.3)
WBC # BLD: 8.5 E9/L (ref 4.5–11.5)

## 2023-01-26 PROCEDURE — 93010 ELECTROCARDIOGRAM REPORT: CPT | Performed by: INTERNAL MEDICINE

## 2023-01-26 PROCEDURE — 6360000002 HC RX W HCPCS: Performed by: INTERNAL MEDICINE

## 2023-01-26 PROCEDURE — 36415 COLL VENOUS BLD VENIPUNCTURE: CPT

## 2023-01-26 PROCEDURE — 6360000002 HC RX W HCPCS: Performed by: STUDENT IN AN ORGANIZED HEALTH CARE EDUCATION/TRAINING PROGRAM

## 2023-01-26 PROCEDURE — 6360000002 HC RX W HCPCS: Performed by: FAMILY MEDICINE

## 2023-01-26 PROCEDURE — 2580000003 HC RX 258: Performed by: STUDENT IN AN ORGANIZED HEALTH CARE EDUCATION/TRAINING PROGRAM

## 2023-01-26 PROCEDURE — A4216 STERILE WATER/SALINE, 10 ML: HCPCS | Performed by: FAMILY MEDICINE

## 2023-01-26 PROCEDURE — 6370000000 HC RX 637 (ALT 250 FOR IP): Performed by: FAMILY MEDICINE

## 2023-01-26 PROCEDURE — 94640 AIRWAY INHALATION TREATMENT: CPT

## 2023-01-26 PROCEDURE — 6370000000 HC RX 637 (ALT 250 FOR IP): Performed by: STUDENT IN AN ORGANIZED HEALTH CARE EDUCATION/TRAINING PROGRAM

## 2023-01-26 PROCEDURE — 6370000000 HC RX 637 (ALT 250 FOR IP): Performed by: INTERNAL MEDICINE

## 2023-01-26 PROCEDURE — 85025 COMPLETE CBC W/AUTO DIFF WBC: CPT

## 2023-01-26 PROCEDURE — 2580000003 HC RX 258: Performed by: FAMILY MEDICINE

## 2023-01-26 PROCEDURE — 80053 COMPREHEN METABOLIC PANEL: CPT

## 2023-01-26 PROCEDURE — 99239 HOSP IP/OBS DSCHRG MGMT >30: CPT | Performed by: INTERNAL MEDICINE

## 2023-01-26 PROCEDURE — C9113 INJ PANTOPRAZOLE SODIUM, VIA: HCPCS | Performed by: FAMILY MEDICINE

## 2023-01-26 RX ORDER — LOPERAMIDE HYDROCHLORIDE 2 MG/1
2 CAPSULE ORAL 4 TIMES DAILY PRN
Refills: 0 | COMMUNITY
Start: 2023-01-26 | End: 2023-02-05

## 2023-01-26 RX ORDER — CEFDINIR 300 MG/1
300 CAPSULE ORAL 2 TIMES DAILY
Qty: 14 CAPSULE | Refills: 0 | Status: SHIPPED | OUTPATIENT
Start: 2023-01-26 | End: 2023-02-02

## 2023-01-26 RX ORDER — METRONIDAZOLE 500 MG/1
500 TABLET ORAL 3 TIMES DAILY
Qty: 21 TABLET | Refills: 0 | Status: SHIPPED | OUTPATIENT
Start: 2023-01-26 | End: 2023-02-02

## 2023-01-26 RX ORDER — LACTOBACILLUS RHAMNOSUS GG 10B CELL
1 CAPSULE ORAL DAILY
Qty: 30 CAPSULE | Refills: 0 | Status: SHIPPED | OUTPATIENT
Start: 2023-01-26

## 2023-01-26 RX ADMIN — IPRATROPIUM BROMIDE 0.5 MG: 0.5 SOLUTION RESPIRATORY (INHALATION) at 08:05

## 2023-01-26 RX ADMIN — ONDANSETRON 4 MG: 4 TABLET, ORALLY DISINTEGRATING ORAL at 06:38

## 2023-01-26 RX ADMIN — ARFORMOTEROL TARTRATE 15 MCG: 15 SOLUTION RESPIRATORY (INHALATION) at 08:05

## 2023-01-26 RX ADMIN — DILTIAZEM HYDROCHLORIDE 120 MG: 120 CAPSULE, COATED, EXTENDED RELEASE ORAL at 09:30

## 2023-01-26 RX ADMIN — BUDESONIDE 250 MCG: 0.25 SUSPENSION RESPIRATORY (INHALATION) at 08:05

## 2023-01-26 RX ADMIN — LEVOTHYROXINE SODIUM 75 MCG: 75 TABLET ORAL at 05:49

## 2023-01-26 RX ADMIN — ACETAMINOPHEN 650 MG: 325 TABLET ORAL at 06:38

## 2023-01-26 RX ADMIN — AMOXICILLIN AND CLAVULANATE POTASSIUM 1 TABLET: 875; 125 TABLET, FILM COATED ORAL at 09:30

## 2023-01-26 RX ADMIN — FLUTICASONE PROPIONATE 2 SPRAY: 50 SPRAY, METERED NASAL at 09:34

## 2023-01-26 RX ADMIN — HYDROMORPHONE HYDROCHLORIDE 1 MG: 1 INJECTION, SOLUTION INTRAMUSCULAR; INTRAVENOUS; SUBCUTANEOUS at 00:29

## 2023-01-26 RX ADMIN — IPRATROPIUM BROMIDE 0.5 MG: 0.5 SOLUTION RESPIRATORY (INHALATION) at 12:18

## 2023-01-26 RX ADMIN — Medication 1 CAPSULE: at 09:30

## 2023-01-26 RX ADMIN — HYDROCHLOROTHIAZIDE 12.5 MG: 12.5 TABLET ORAL at 09:30

## 2023-01-26 RX ADMIN — LORAZEPAM 1 MG: 1 TABLET ORAL at 09:30

## 2023-01-26 RX ADMIN — SODIUM CHLORIDE, PRESERVATIVE FREE 10 ML: 5 INJECTION INTRAVENOUS at 09:33

## 2023-01-26 RX ADMIN — LISINOPRIL 20 MG: 20 TABLET ORAL at 09:30

## 2023-01-26 RX ADMIN — SODIUM CHLORIDE 40 MG: 9 INJECTION, SOLUTION INTRAMUSCULAR; INTRAVENOUS; SUBCUTANEOUS at 09:31

## 2023-01-26 RX ADMIN — LOPERAMIDE HYDROCHLORIDE 2 MG: 2 CAPSULE ORAL at 09:32

## 2023-01-26 RX ADMIN — OXYCODONE AND ACETAMINOPHEN 1 TABLET: 5; 325 TABLET ORAL at 09:31

## 2023-01-26 RX ADMIN — CITALOPRAM HYDROBROMIDE 30 MG: 20 TABLET ORAL at 09:31

## 2023-01-26 ASSESSMENT — PAIN SCALES - GENERAL
PAINLEVEL_OUTOF10: 6
PAINLEVEL_OUTOF10: 10
PAINLEVEL_OUTOF10: 7

## 2023-01-26 ASSESSMENT — PAIN - FUNCTIONAL ASSESSMENT
PAIN_FUNCTIONAL_ASSESSMENT: ACTIVITIES ARE NOT PREVENTED

## 2023-01-26 ASSESSMENT — PAIN DESCRIPTION - DESCRIPTORS
DESCRIPTORS: ACHING;SORE;TENDER
DESCRIPTORS: ACHING;SORE
DESCRIPTORS: CRAMPING

## 2023-01-26 ASSESSMENT — PAIN DESCRIPTION - ORIENTATION
ORIENTATION: RIGHT;LEFT

## 2023-01-26 ASSESSMENT — PAIN DESCRIPTION - LOCATION
LOCATION: ABDOMEN
LOCATION: ABDOMEN
LOCATION: HEAD

## 2023-01-26 NOTE — PATIENT CARE CONFERENCE
[facility] Quality Flow/Interdisciplinary Rounds Progress Note        Quality Flow Rounds held on January 26, 2023    Disciplines Attending:  Bedside Nurse, , , and Nursing Unit Leadership    Willistine Hamman was admitted on 1/20/2023  7:57 PM    Anticipated Discharge Date:       Disposition:    Billy Score:  Billy Scale Score: 21    Readmission Risk              Risk of Unplanned Readmission:  13           Discussed patient goal for the day, patient clinical progression, and barriers to discharge.   The following Goal(s) of the Day/Commitment(s) have been identified:  Discharge - Obtain Order      Traci Hill RN  January 26, 2023

## 2023-01-26 NOTE — PROGRESS NOTES
CLINICAL PHARMACY NOTE: MEDS TO BEDS    Total # of Prescriptions Filled: 2   The following medications were delivered to the patient:  Cefdinir 300  Metronidazole 500    Additional Documentation:

## 2023-01-26 NOTE — DISCHARGE SUMMARY
[facility] Physician Discharge Summary       Hamilton Butts MD  [address] [phone]    Schedule an appointment as soon as possible for a visit in 2 week(s)      [address] ,   [address]  [phone]            Activity level: As tolerated     Dispo: Home    Condition on discharge: Stable     Patient ID:  Oc Morales  [id number]  75 y.o.  1955    Admit date: 1/20/2023    Discharge date and time:  1/26/2023  9:19 AM    Admission Diagnoses: Principal Problem:    Colitis  Active Problems:    Hypomagnesemia    Diarrhea    Atypical chest pain    GALLEGOS (dyspnea on exertion)    Supraventricular tachycardia ([address]    Primary hypertension    Restless leg syndrome  Resolved Problems:    * No resolved hospital problems. *      Discharge Diagnoses: Principal Problem:    Colitis  Active Problems:    Hypomagnesemia    Diarrhea    Atypical chest pain    GALLEGOS (dyspnea on exertion)    Supraventricular tachycardia (HCC)    Primary hypertension    Restless leg syndrome  Resolved Problems:    * No resolved hospital problems. *      Consults:  IP CONSULT TO GENERAL SURGERY  IP CONSULT TO IV TEAM  IP CONSULT TO IV TEAM  IP CONSULT TO IV TEAM  IP CONSULT TO CARDIOLOGY    Hospital Course:   Patient Oc Morales is a 75 y.o. presented with Colitis [K52.9]  GALLEGOS (dyspnea on exertion) [R509]    70-year-old female with a past medical history of ischemic colitis presents emergency department for left lower quadrant abdominal pain and bloody stools for few days. Patient complains of diffuse abdominal pain localized to the left lower quadrant. Patient does have a history of ischemic colitis over 5 years ago. Patient admits to bloody stools since this morning. The following issues were addressed during hospitalization:    1. Acute diarrhea - stool cultures and C diff negative. Advanced to Regular diet. Lactobacillus ordered. No plans for inpatient endoscopy at this time. Continue omnicef and flagyl. Follow up outpatient with surgery for colonoscopy. Patient can continue using percocet from home. Will start on imodium as well for home. 2.  Hypomagnesemia - mag replaced, now 1.9.   3. HTN - Continue Cardizem and Lisinopril. Monitor vitals and adjust accordingly. Lopressor x 1 on 1/25 due to hypertension with episode of emesis. 4.  Hypothyroidism - continue meds. 5.  Restless leg syndrome - Continue requip. Monitor vitals and adjust accordingly  6. COPD - supportive care. Continue nebs/respiratory support. 7.  Depression/Anxiety - Continue Celexa and Ativan. Monitor vitals and adjust accordingly  8. Hypokalemia - K 3.3, replete and monitor. Discussed drinking water and electrolytes. The patient is hemodynamically and medically stable for discharge home. Follow up with PCP and surgery. Discharge Exam:    General Appearance: alert and oriented to person, place and time and in no acute distress  Skin: warm and dry  Head: normocephalic and atraumatic  Eyes: pupils equal, round, and reactive to light, extraocular eye movements intact, conjunctivae normal  Neck: neck supple and non tender without mass   Pulmonary/Chest: clear to auscultation bilaterally- no wheezes, rales or rhonchi, normal air movement, no respiratory distress  Cardiovascular: normal rate, normal S1 and S2 and no carotid bruits  Abdomen: soft, non-tender, non-distended, normal bowel sounds, no masses or organomegaly  Extremities: no cyanosis, no clubbing and no edema  Neurologic: no cranial nerve deficit and speech normal    I/O last 3 completed shifts: In: 6703 [P.O.:480; I.V.:1165]  Out: -   No intake/output data recorded.       LABS:  Recent Labs     01/24/23  1320 01/25/23  0255    136   K 3.5 3.3*    103   CO2 27 26   BUN 4* 5*   CREATININE 1.0 1.1*   GLUCOSE 106* 101*   CALCIUM 8.9 9.0       Recent Labs 01/24/23  1320 01/25/23  0255 01/26/23  0853   WBC 7.3 8.3 8.5   RBC 3.82 3.78 4.14   HGB 11.1* 11.2* 12.1   HCT 33.7* 33.5* 36.6   MCV 88.2 88.6 88.4   MCH 29.1 29.6 29.2   MCHC 32.9 33.4 33.1   RDW 13.0 13.1 13.1    263 297   MPV 9.7 9.9 10.1       No results for input(s): POCGLU in the last 72 hours. Imaging:  CT ABDOMEN PELVIS W IV CONTRAST Additional Contrast? None    Result Date: 1/20/2023  EXAMINATION: CT OF THE ABDOMEN AND PELVIS WITH CONTRAST 1/20/2023 10:15 pm TECHNIQUE: CT of the abdomen and pelvis was performed with the administration of intravenous contrast. Multiplanar reformatted images are provided for review. Automated exposure control, iterative reconstruction, and/or weight based adjustment of the mA/kV was utilized to reduce the radiation dose to as low as reasonably achievable. COMPARISON: CT abdomen and pelvis, 07/07/2014. HISTORY: ORDERING SYSTEM PROVIDED HISTORY: abdominal pain TECHNOLOGIST PROVIDED HISTORY: Additional Contrast?->None Reason for exam:->abdominal pain Decision Support Exception - unselect if not a suspected or confirmed emergency medical condition->Emergency Medical Condition (MA) FINDINGS: Lower Chest: Minimal streaky areas of atelectasis or scarring in the lung bases. The lung bases are otherwise clear. The heart is normal in size. No pleural or pericardial effusion noted. 1 Organs: Liver: Unremarkable. Gallbladder: Surgically absent. Pancreas: Moderately atrophied. No mass, ductal dilatation or edema is seen. Spleen:  Unremarkable. Adrenals: Unremarkable. Kidneys: Unremarkable. GI/Bowel: Mild pericolic edema in the proximal descending colon. The remainder of the bowel is grossly unremarkable. Mild distal colonic diverticulosis. Pelvis: The urinary bladder is unremarkable. Within limits of the CT technique, the uterus and the adnexa are grossly unremarkable. Peritoneum/Retroperitoneum: Moderate calcified atherosclerosis is seen in the aorta. No aneurysm.   No lymphadenopathy. No free air or free fluid is seen. Bones/Soft Tissues: The visualized bones are intact without fracture or focal lesion. Prominent loss of disc height at L5-S1. Minimal pericolic edema along the proximal descending colon may be due to COLITIS OR ACUTE, UNCOMPLICATED DIVERTICULITIS. Patient Instructions:      Medication List        START taking these medications      cefdinir 300 MG capsule  Commonly known as: OMNICEF  Take 1 capsule by mouth 2 times daily for 7 days     lactobacillus Caps capsule  Take 1 capsule by mouth daily     loperamide 2 MG capsule  Commonly known as: IMODIUM  Take 1 capsule by mouth 4 times daily as needed for Diarrhea     metroNIDAZOLE 500 MG tablet  Commonly known as: FLAGYL  Take 1 tablet by mouth 3 times daily for 10 days            CONTINUE taking these medications      benazepril-hydrochlorthiazide 20-12.5 MG per tablet  Commonly known as: LOTENSIN HCT  TAKE ONE TABLET BY MOUTH DAILY     Citalopram Hydrobromide 30 MG Caps     dilTIAZem 120 MG extended release capsule  Commonly known as: CARDIZEM CD  Take 1 capsule by mouth daily     fluticasone 50 MCG/ACT nasal spray  Commonly known as: Flonase  2 sprays by Nasal route daily     fluticasone-umeclidin-vilant 100-62.5-25 MCG/ACT Aepb inhaler  Commonly known as: TRELEGY ELLIPTA  Inhale 1 puff into the lungs daily     levothyroxine 75 MCG tablet  Commonly known as: SYNTHROID  TAKE ONE TABLET BY MOUTH IN THE MORNING     LORazepam 1 MG tablet  Commonly known as: ATIVAN     montelukast 10 MG tablet  Commonly known as: SINGULAIR  Take 1 tablet by mouth nightly     omeprazole 40 MG delayed release capsule  Commonly known as: PriLOSEC  Take 1 capsule by mouth daily.      oxyCODONE-acetaminophen  MG per tablet  Commonly known as: PERCOCET     Proventil  (90 Base) MCG/ACT inhaler  Generic drug: albuterol sulfate HFA  Inhale 2 puffs into the lungs every 6 hours as needed for Wheezing (wheezing) Wants the proventil. Will pay out of pocket.      rOPINIRole 1 MG tablet  Commonly known as: REQUIP  TAKE ONE TABLET BY MOUTH NIGHTLY     Vitamin D3 1.25 MG (77233 UT) Caps  Take 1 capsule by mouth once a week            STOP taking these medications      Co Q-10 100 MG Caps     Fosamax 70 MG tablet  Generic drug: alendronate     lidocaine 5 %  Commonly known as: LIDODERM     tiZANidine 2 MG tablet  Commonly known as: ZANAFLEX     TL Rafael Rx 2.2-25-1 MG Tabs tablet  Generic drug: folic acid-pyridoxine-cyanocobalamine     traZODone 100 MG tablet  Commonly known as: DESYREL               Where to Get Your Medications        These medications were sent to [address] [place] [address] [facility]/ [address]      Phone: [phone]   cefdinir 300 MG capsule  metroNIDAZOLE 500 MG tablet       These medications were sent to [address] [place] [address] [phone]  [address] [place] [id number]      Phone: [phone]   lactobacillus Caps capsule       You can get these medications from any pharmacy    You don't need a prescription for these medications  loperamide 2 MG capsule       Note that more than 30 minutes was spent in preparing discharge papers, discussing discharge with patient, medication review, etc.    Signed:  Electronically signed by DAGO Castellano NP on 1/26/2023 at 9:19 AM

## 2023-01-26 NOTE — PLAN OF CARE
Problem: Pain  Goal: Verbalizes/displays adequate comfort level or baseline comfort level  1/25/2023 2330 by Rozina Peña RN  Outcome: Progressing     Problem: Gastrointestinal - Adult  Goal: Minimal or absence of nausea and vomiting  1/25/2023 2330 by Rozina Peña RN  Outcome: Progressing     Problem: Genitourinary - Adult  Goal: Absence of urinary retention  1/25/2023 2330 by Rozina Peña RN  Outcome: Progressing

## 2023-01-27 ENCOUNTER — TELEPHONE (OUTPATIENT)
Dept: FAMILY MEDICINE CLINIC | Age: 68
End: 2023-01-27

## 2023-01-27 ENCOUNTER — CARE COORDINATION (OUTPATIENT)
Dept: CASE MANAGEMENT | Age: 68
End: 2023-01-27

## 2023-01-27 ENCOUNTER — TELEPHONE (OUTPATIENT)
Dept: CARDIOLOGY CLINIC | Age: 68
End: 2023-01-27

## 2023-01-27 DIAGNOSIS — K52.9 COLITIS: Primary | ICD-10-CM

## 2023-01-27 PROCEDURE — 1111F DSCHRG MED/CURRENT MED MERGE: CPT | Performed by: FAMILY MEDICINE

## 2023-01-27 RX ORDER — ROPINIROLE 1 MG/1
TABLET, FILM COATED ORAL
Qty: 90 TABLET | Refills: 0 | Status: SHIPPED | OUTPATIENT
Start: 2023-01-27 | End: 2023-04-27

## 2023-01-27 NOTE — CARE COORDINATION
[facility] Care Transitions Initial Follow Up Call    Call within 2 business days of discharge: Yes    Care Transition Nurse contacted the patient by telephone to perform post hospital discharge assessment. Verified name and  with patient as identifiers. Provided introduction to self, and explanation of the Care Transition Nurse role. Patient: Faraz Mckinley Patient : 1955   MRN: [id number]  Reason for Admission: colitis   Discharge Date: 23 RARS: Readmission Risk Score: 9.8      Last Discharge  Street       Date Complaint Diagnosis Description Type Department Provider    23 Abdominal Pain; Diarrhea; Nausea; Emesis Colitis . .. ED to Hosp-Admission (Discharged) (ADMITTED) Kimberley Medina MD; Monse Go. .. Spoke with Ana Cristina Quiroga for initial low readmission risk score care transition call post hospital discharge. She reports that she had a \"bad night\". She went on to explain that she had excruciating right sided pain that lasted about 5 minutes after bending over at the waist, she has not had the pain since. She does continue to have LLQ pain that she rates about a 5-7/10. She continues to have diarrhea and is taking Imodium with some relief. She reports decreased appetite and feeling like she doesn't know what she should be eating right now. CTN discussed eating a bland diet and different options were discussed. CTN also discussed the importance of staying hydrated, Ana Cristina Quiroga feels she is staying hydrated at this time. Also discussed the importance of picking up the OTC Graciela Benito stated she will most likely go out later today to buy it. CTN encouraged rest and hydration for the next few days. Ana Cristina Quiroga reports that she has 2 dogs and horses and is concerned about caring for her animals. She stated her neighbor did offer to feed her horses today so she will take the neighbor up on the offer. Ana Cristina Quiroga denies any other needs, questions, or concerns at this time.      Care Transition Nurse reviewed discharge instructions, medical action plan, and red flags with patient who verbalized understanding. The patient was given an opportunity to ask questions and does not have any further questions or concerns at this time. Were discharge instructions available to patient? Yes. Reviewed appropriate site of care based on symptoms and resources available to patient including: PCP  Specialist  Urgent care clinics  [facility] 24/7  When to call [address] The patient agrees to contact the PCP and surgery office for questions related to their healthcare. Medication reconciliation was performed with patient, who verbalizes understanding of administration of home medications. CTN expressed the importance of completing antibiotic therapy, Alda Arechiga verbalized understanding. Medications reviewed, 1111F entered: yes    Was patient discharged with a pulse oximeter? no    Non-face-to-face services provided:  Scheduled appointment with PCP-unable to schedule HFU with PCP due to provider availability, will route to office to schedule, Alda Arechiga is in agreement. Scheduled appointment with Lauren Akers with Dr. Miguel Ramirez' office number, she will call today to schedule HFU. She stated she also received a message from Dr. Hair's office to reschedule her follow up with him and plans to call today. Obtained and reviewed discharge summary and/or continuity of care documents    Offered patient enrollment in the Remote Patient Monitoring (RPM) program for in-home monitoring: Patient declined.     Care Transitions 24 Hour Call    Do you have a copy of your discharge instructions?: Yes  Do you have all of your prescriptions and are they filled?: Yes  Have you been contacted by a [employer] [address]?: No  Have you scheduled your follow up appointment?: No  Do you have support at home?: Alone  Do you feel like you have everything you need to keep you well at home?: Yes  Are you an active caregiver in your home?: No  Care Transitions Interventions         Follow Up  Future Appointments   Date Time Provider Zarina Smith   2/8/2023 10:00 AM DO COSTA [facility]   2/15/2023  8:00 AM [facility] CT SCAN 3 SEYZ CT [facility]       Care Transition Nurse provided contact information. Plan for follow-up call in 5-7 days based on severity of symptoms and risk factors. Plan for next call: symptom management-LLQ pain improving? Tolerating diet?   follow-up appointment-HFU scheduled with PCP and surgery?      Reena Stewart RN

## 2023-01-27 NOTE — TELEPHONE ENCOUNTER
Patient appointment made for Friday 2/3/23 at 11:20am.  Patient has a call out to Dr. Hair's office regarding an intermittent heart racing episode with SOB last night about 8pm, lasting 1-2 hours.  Dr. Hair's office aware and will return patient call.  Denies any further episodes but expresses concerns regarding caring for all of her animals (horses, dogs) and that she doesn't have much of a support system.  Denies need to return to ED at this time.  Patient will reach out to surgical consult for colonoscopy next week.

## 2023-01-27 NOTE — TELEPHONE ENCOUNTER
Will attempt at a later time this afternoon to schedule a HFU TCM appointment for patient. No answer at this time. No voicemail.

## 2023-01-27 NOTE — TELEPHONE ENCOUNTER
Please advise regarding hospital F/U. Patient states she had an episode last night where she was very short of breath and her heart was racing. Symptoms lasted about 1.5 hours. Patient also making sure she is to proceed with Coronary CTA as scheduled. Dr. Jessica Lombardi told her that the Coronary CTA was cancelled by Dr. Lida Bucio. However, there was some confusion previously but per telephone encounter from 1/5/23, patient is to proceed with Coronary CTA. Please review and advise.

## 2023-01-27 NOTE — TELEPHONE ENCOUNTER
Medication Refill Request    LOV 1/20/2023  NOV Visit date not found    Lab Results   Component Value Date    CREATININE 1.1 (H) 01/26/2023

## 2023-01-30 NOTE — TELEPHONE ENCOUNTER
I will see her in one month, did talk her today and advised to call me if she develops any further episodes of chest pain

## 2023-02-01 ENCOUNTER — CARE COORDINATION (OUTPATIENT)
Dept: CASE MANAGEMENT | Age: 68
End: 2023-02-01

## 2023-02-01 NOTE — CARE COORDINATION
[facility] Care Transitions Follow Up Call    Care Transition Nurse contacted the patient by telephone to follow up after admission on 23. Verified name and  with patient as identifiers. Patient: Taz Guillermo  Patient : 1955   MRN: [id number]  Reason for Admission: colitis   Discharge Date: 23 RARS: Readmission Risk Score: 9.8    Spoke with Kehinde Jones for follow up care transition call. She reports that she is feeling somewhat better this week. She is having a hard time eating because of poor appetite and having a horrible taste in her mouth. She did buy Boost and has been able to eat eggs. She is no longer having diarrhea and reports that her stool is now \"mushy\" and not quite formed. She has a couple of doses of antibiotics remaining. She denies any abdominal pain. Kehinde Jones stated her breathing is improving since she resumed her Trelegy once she returned home (she was not getting it while IP). She also denies any palpitations, feeling as though her heart is pounding, CP since the episode she had the first night home. She is caring for her horses and [employer] on her own without issue. Addressed changes since last contact:  none  Discussed follow-up appointments. Follow Up  Future Appointments   Date Time Provider Zarina Smith   2/3/2023 11:20 AM DO Kaleb [facility]   2023 10:00 AM Antonina Ware DO [facility]   2/15/2023  8:00 AM [facility] CT SCAN 3 SEYZ CT [facility]   3/16/2023  9:40 AM Danay Mtz  [facility] Transition Nurse reviewed discharge instructions, medical action plan, and red flags with patient and discussed any barriers to care and/or understanding of plan of care after discharge. Discussed appropriate site of care based on symptoms and resources available to patient including: PCP  Specialist  Urgent care clinics  [facility]   When to call [address]  The patient agrees to contact the PCP office for questions related to their healthcare.      Care Transitions Subsequent and Final Call    Subsequent and Final Calls  Do you have any ongoing symptoms?: No  Have your medications changed?: No  Do you have any questions related to your medications?: No  Do you currently have any active services?: No  Do you have any needs or concerns that I can assist you with?: No  Identified Barriers: None  Care Transitions Interventions  No Identified Needs  Other Interventions:           Cheryl denies any needs, questions, or concerns at this time. She kindly declines the need for further follow up calls at this time, stating she has several appointments the next few weeks. She does have CTN's contact information and will call with any needs or concerns.     Carli Rushing RN

## 2023-02-03 ENCOUNTER — OFFICE VISIT (OUTPATIENT)
Dept: FAMILY MEDICINE CLINIC | Age: 68
End: 2023-02-03

## 2023-02-03 VITALS
OXYGEN SATURATION: 96 % | WEIGHT: 164.8 LBS | BODY MASS INDEX: 31.14 KG/M2 | DIASTOLIC BLOOD PRESSURE: 69 MMHG | HEART RATE: 90 BPM | SYSTOLIC BLOOD PRESSURE: 147 MMHG | TEMPERATURE: 97.5 F

## 2023-02-03 DIAGNOSIS — Z09 HOSPITAL DISCHARGE FOLLOW-UP: ICD-10-CM

## 2023-02-03 DIAGNOSIS — R07.9 CHEST PAIN, UNSPECIFIED TYPE: ICD-10-CM

## 2023-02-03 DIAGNOSIS — K52.9 ACUTE COLITIS: Primary | ICD-10-CM

## 2023-02-03 DIAGNOSIS — R06.09 DOE (DYSPNEA ON EXERTION): ICD-10-CM

## 2023-02-03 SDOH — ECONOMIC STABILITY: INCOME INSECURITY: HOW HARD IS IT FOR YOU TO PAY FOR THE VERY BASICS LIKE FOOD, HOUSING, MEDICAL CARE, AND HEATING?: NOT VERY HARD

## 2023-02-03 SDOH — ECONOMIC STABILITY: FOOD INSECURITY: WITHIN THE PAST 12 MONTHS, THE FOOD YOU BOUGHT JUST DIDN'T LAST AND YOU DIDN'T HAVE MONEY TO GET MORE.: NEVER TRUE

## 2023-02-03 SDOH — ECONOMIC STABILITY: FOOD INSECURITY: WITHIN THE PAST 12 MONTHS, YOU WORRIED THAT YOUR FOOD WOULD RUN OUT BEFORE YOU GOT MONEY TO BUY MORE.: NEVER TRUE

## 2023-02-03 SDOH — ECONOMIC STABILITY: HOUSING INSECURITY
IN THE LAST 12 MONTHS, WAS THERE A TIME WHEN YOU DID NOT HAVE A STEADY PLACE TO SLEEP OR SLEPT IN A SHELTER (INCLUDING NOW)?: NO

## 2023-02-03 ASSESSMENT — ENCOUNTER SYMPTOMS
NAUSEA: 0
VOMITING: 0
SHORTNESS OF BREATH: 1
COUGH: 1
WHEEZING: 0
DIARRHEA: 1
ABDOMINAL PAIN: 1
BLOOD IN STOOL: 0

## 2023-02-03 ASSESSMENT — LIFESTYLE VARIABLES
HOW MANY STANDARD DRINKS CONTAINING ALCOHOL DO YOU HAVE ON A TYPICAL DAY: PATIENT DOES NOT DRINK
HOW OFTEN DO YOU HAVE A DRINK CONTAINING ALCOHOL: NEVER

## 2023-02-03 NOTE — PROGRESS NOTES
Post-Discharge Transitional Care  Follow Up      Guy Song   YOB: 1955    Date of Office Visit:  2/3/2023  Date of Hospital Admission: 1/20/23  Date of Hospital Discharge: 1/26/23  Risk of hospital readmission (high >=14%. Medium >=10%) :Readmission Risk Score: 9.8      Care management risk score Rising risk (score 2-5) and Complex Care (Scores >=6): No Risk Score On File     Non face to face  following discharge, date last encounter closed (first attempt may have been earlier): 01/27/2023    Call initiated 2 business days of discharge: Yes    ASSESSMENT/PLAN:   Acute colitis  Improved. Follow-up with General Surgery. Chest pain, unspecified type  Improved. Await results of CTA coronary. Follow-up with Cardiology. GALLEGOS (dyspnea on exertion)  Improved. Await results of CTA coronary. Follow-up with Cardiology. Hospital discharge follow-up  -     [place] DISCHARGE MEDS RECONCILED W/ CURRENT OUTPATIENT MED LIST    Medical Decision Making: High complexity    Return in about 6 weeks (around 3/17/2023) for Medicare AWV; BP recheck. Subjective:   HPI:  Follow up of hospital problems/diagnosis(es): colitis, chest pain    Inpatient Course:   Patient was admitted on 01/20/2023 to [facility] from ED for colitis. Patient had mildly elevated WBC. CT abdomen/pelvis showed colitis vs acute diverticulitis. C diff negative. Stool cultures negative. Patient was started on IVFs and IV antibiotics. During her admission, patient developed chest pain. Cardiology was consulted. EKG showed NSR. Troponin slightly elevated at 11. Cardiology recommended that patient follow-up with her Cardiologist, Dr. Alex Price, as an outpatient, as a CTA from 12/2022 showed mild disease burden. However, there were problems with the CTA machine, so a NM stress test is recommended. Patient was discharged on Omnicef and Flagyl.      Interval History/Current Status: Patient states that she had another episode of shortness of breath on 01/27/2023. She contacted Dr. Braga Sera office. Dr. Ebony Hemphill reordered cardiac CTA, which is scheduled for 02/15/2023. She has an appointment with Dr. Ebony Hemphill in office on 03/16/2023. Patient states that abdominal pain has lessened, but she is still having diarrhea daily. No blood in stool. No fevers or chills. She reports decreased appetite and an uncomfortable feeling in her abdomen when eating. She has an appointment with General Surgery, Dr. Aaron Olivas, on 02/13/2023. Patient Active Problem List   Diagnosis    Hyperlipidemia    Hypothyroidism    Primary hypertension    COPD (chronic obstructive pulmonary disease) ([facility] [facility]    Chronic seasonal allergic rhinitis due to fungal spores    Chronic pain syndrome    Hernia, hiatal    Irritable bowel syndrome with both constipation and diarrhea    Recurrent major depressive disorder (HCC)    Allergic rhinitis    GERD (gastroesophageal reflux disease)    Mild tricuspid regurgitation    Pulmonary HTN (HCC)    Grade I diastolic dysfunction    Restless leg syndrome    Overactive bladder    Vitamin D deficiency    Osteopenia of multiple sites    Stage 3a chronic kidney disease (HCC)    Prediabetes    Colitis    Hypomagnesemia    Diarrhea    Atypical chest pain    GALLEGOS (dyspnea on exertion)    Supraventricular tachycardia (HCC)       Medications listed as ordered at the time of discharge from hospital     Medication List            Accurate as of February 3, 2023 11:59 PM. If you have any questions, ask your nurse or doctor.                 CHANGE how you take these medications      lactobacillus Caps capsule  Take 1 capsule by mouth daily  What changed: additional instructions            CONTINUE taking these medications      benazepril-hydrochlorthiazide 20-12.5 MG per tablet  Commonly known as: LOTENSIN HCT  TAKE ONE TABLET BY MOUTH DAILY     Citalopram Hydrobromide 30 MG Caps     dilTIAZem 120 MG extended release capsule  Commonly known as: CARDIZEM CD  Take 1 capsule by mouth daily     fluticasone 50 MCG/ACT nasal spray  Commonly known as: Flonase  2 sprays by Nasal route daily     fluticasone-umeclidin-vilant 100-62.5-25 MCG/ACT Aepb inhaler  Commonly known as: TRELEGY ELLIPTA  Inhale 1 puff into the lungs daily     levothyroxine 75 MCG tablet  Commonly known as: SYNTHROID  TAKE ONE TABLET BY MOUTH IN THE MORNING     loperamide 2 MG capsule  Commonly known as: IMODIUM  Take 1 capsule by mouth 4 times daily as needed for Diarrhea     LORazepam 1 MG tablet  Commonly known as: ATIVAN     montelukast 10 MG tablet  Commonly known as: SINGULAIR  Take 1 tablet by mouth nightly     omeprazole 40 MG delayed release capsule  Commonly known as: PriLOSEC  Take 1 capsule by mouth daily. oxyCODONE-acetaminophen  MG per tablet  Commonly known as: PERCOCET     Proventil  (90 Base) MCG/ACT inhaler  Generic drug: albuterol sulfate HFA  Inhale 2 puffs into the lungs every 6 hours as needed for Wheezing (wheezing) Wants the proventil. Will pay out of pocket.      rOPINIRole 1 MG tablet  Commonly known as: REQUIP  TAKE ONE TABLET BY MOUTH nightly     Vitamin D3 1.25 MG (77768 UT) Caps  Take 1 capsule by mouth once a week                Medications marked \"taking\" at this time  Outpatient Medications Marked as Taking for the 2/3/23 encounter (Office Visit) with Darryl Wilder, DO   Medication Sig Dispense Refill    rOPINIRole (REQUIP) 1 MG tablet TAKE ONE TABLET BY MOUTH nightly 90 tablet 0    lactobacillus (CULTURELLE) CAPS capsule Take 1 capsule by mouth daily (Patient taking differently: Take 1 capsule by mouth daily Will buy OTC today.) 30 capsule 0    loperamide (IMODIUM) 2 MG capsule Take 1 capsule by mouth 4 times daily as needed for Diarrhea  0    fluticasone-umeclidin-vilant (TRELEGY ELLIPTA) 100-62.5-25 MCG/ACT AEPB inhaler Inhale 1 puff into the lungs daily 1 each 5    dilTIAZem (CARDIZEM CD) 120 MG extended release capsule Take 1 capsule by mouth daily 30 capsule 11 levothyroxine (SYNTHROID) 75 MCG tablet TAKE ONE TABLET BY MOUTH IN THE MORNING 90 tablet 0    Cholecalciferol (VITAMIN D3) 1.25 MG (63818 UT) CAPS Take 1 capsule by mouth once a week 12 capsule 1    montelukast (SINGULAIR) 10 MG tablet Take 1 tablet by mouth nightly 90 tablet 1    benazepril-hydrochlorthiazide (LOTENSIN HCT) 20-12.5 MG per tablet TAKE ONE TABLET BY MOUTH DAILY 90 tablet 1    PROVENTIL  (90 Base) MCG/ACT inhaler Inhale 2 puffs into the lungs every 6 hours as needed for Wheezing (wheezing) Wants the proventil. Will pay out of pocket. 1 each 3    oxyCODONE-acetaminophen (PERCOCET)  MG per tablet Take 1 tablet by mouth every 8 hours as needed. fluticasone (FLONASE) 50 MCG/ACT nasal spray 2 sprays by Nasal route daily 1 Bottle 3    omeprazole (PRILOSEC) 40 MG capsule Take 1 capsule by mouth daily. 30 capsule 3    LORazepam (ATIVAN) 1 MG tablet Take 1 mg by mouth in the morning and at bedtime. Medications patient taking as of now reconciled against medications ordered at time of hospital discharge: Yes    Review of Systems   Constitutional:  Negative for chills and fever. Respiratory:  Positive for cough and shortness of breath. Negative for wheezing. Cardiovascular:  Negative for chest pain, palpitations and leg swelling. Gastrointestinal:  Positive for abdominal pain (improved) and diarrhea. Negative for blood in stool, nausea and vomiting. Neurological:  Negative for dizziness, syncope and light-headedness. Objective:    BP (!) 147/69 (Site: Right Upper Arm, Position: Sitting, Cuff Size: Large Adult)   Pulse 90   Temp 97.5 °F (36.4 °C)   Wt 164 lb 12.8 oz (74.8 kg)   SpO2 96%   BMI 31.14 kg/m²       Physical Exam  Vitals reviewed. Constitutional:       General: She is awake. She is not in acute distress. Appearance: She is not ill-appearing, toxic-appearing or diaphoretic. Neck:      Vascular: No carotid bruit.    Cardiovascular:      Rate and Rhythm: Normal rate and regular rhythm. Heart sounds: S1 normal and S2 normal. No murmur heard. Pulmonary:      Breath sounds: No decreased breath sounds, wheezing, rhonchi or rales. Abdominal:      General: Bowel sounds are normal. There is no distension. Palpations: Abdomen is soft. Tenderness: There is generalized abdominal tenderness (mild). There is no guarding or rebound. Musculoskeletal:      Cervical back: Neck supple. Right lower leg: No tenderness. No edema. Left lower leg: No tenderness. No edema. Skin:     General: Skin is warm and dry. Neurological:      General: No focal deficit present. Mental Status: She is alert. Psychiatric:         Attention and Perception: Attention and perception normal.         Mood and Affect: Mood and affect normal.         Speech: Speech normal.         Behavior: Behavior normal. Behavior is cooperative. Thought Content:  Thought content normal.         Cognition and Memory: Cognition and memory normal.         Judgment: Judgment normal.       Electronically signed by Gladys Sandra DO on 2/5/2023 at 2:32 PM

## 2023-02-08 ENCOUNTER — OFFICE VISIT (OUTPATIENT)
Dept: PULMONOLOGY | Age: 68
End: 2023-02-08
Payer: MEDICARE

## 2023-02-08 DIAGNOSIS — Z87.891 PERSONAL HISTORY OF TOBACCO USE: Primary | ICD-10-CM

## 2023-02-08 DIAGNOSIS — J43.1 PANLOBULAR EMPHYSEMA (HCC): ICD-10-CM

## 2023-02-08 DIAGNOSIS — J41.0 SIMPLE CHRONIC BRONCHITIS (HCC): ICD-10-CM

## 2023-02-08 PROCEDURE — 99213 OFFICE O/P EST LOW 20 MIN: CPT | Performed by: INTERNAL MEDICINE

## 2023-02-08 RX ORDER — ALBUTEROL SULFATE 90 UG/1
2 AEROSOL, METERED RESPIRATORY (INHALATION) EVERY 6 HOURS PRN
Qty: 18 G | Refills: 3 | Status: SHIPPED | OUTPATIENT
Start: 2023-02-08

## 2023-02-08 NOTE — PATIENT INSTRUCTIONS
[facility]  [address] [facility], [address] [place]  Office: [phone]      Your were seen in the office today for COPD      We  did make changes to your medications today. Nystatin added    Testing ordered today was CT scan of the chest due May of 2023      Vaccines recommended none (up to date)               Please do not hesitate to call the office with any questions.

## 2023-02-08 NOTE — PROGRESS NOTES
[facility]     HISTORY OF PRESENT ILLNESS:    Tommy Barrett is a 75y.o. year old female here for evaluation of asthma. The patient reports that despite her inhalers she is having more frequent issues with her breathing. She is currently prescribed Spiriva, Flovent, and Proventil. She reports that she is sometimes using the Flovent 3-4 times a day. Her breathing is worse with humidity and cold air. Symptoms include wheezing. She also reports having some sinus drainage and a daily cough. She quit smoking 2 or 3 years ago she reports that previously she smoked half to 1 pack/day for 40-adair years. She denies any thoracic surgeries. She denies any alcohol. No illicits. Her inhalational exposures reports that she is around hay, raccoons, a wood burner, diesel smoke, and she has 2 horses. She has no significant travel history. She reports that her father  of lung cancer and that her brother has COPD and \"spots on his lungs. Updated HPI as of 2022:  Patient reports that her breathing feels a little bit better. She is using her albuterol inhaler sometimes 2-3 times a day on bad days sometimes not having to use it at all she does state that she continues to use the Trelegy inhaler. She states that when she notices that she is most short of breath is when she is going outside and working with her horses. And walking long distances to and from her bottom. Sometimes she does feel occasional heart racing with the shortness of breath however this does quickly subside with rest.  She denies any symptoms of chest pain chest pressure. Updated HPI 23:  Patient seen and examined. She is still using the Trelegy inhaler and singulair. She is using the rescue inhaler twice a day. She reports that she was admitted to [facility]  to  due to colitis. She denies any new respiratory symptoms.  She does however state that she feels like she is slowing down some when walking long distances. She is concerned about possible thrush.      ALLERGIES:    Allergies   Allergen Reactions    Celebrex [Celecoxib]     Simvastatin Myalgia    Avelox [Moxifloxacin Hcl In Nacl]     Biaxin [Clarithromycin]     Clarithromycin     Desvenlafaxine     Lipitor [Atorvastatin] Myalgia    Lyrica [Pregabalin]     Tetracyclines & Related     Zithromax [Azithromycin]     Sulfa Antibiotics Rash    Vioxx [Rofecoxib] Nausea And Vomiting       PAST MEDICAL HISTORY:       Diagnosis Date    Allergic rhinitis     Chronic pain syndrome     follows with [facility], Dr. Audrey Mascorro    COPD (chronic obstructive pulmonary disease) ([facility]     follows with Pulmonology, Dr. Treva Hernandez    GERD (gastroesophageal reflux disease)     Grade I diastolic dysfunction 5034    Hernia, hiatal 12/14/2017    Hyperlipidemia     Hypertension     Hypothyroidism 04/07/2017    Irritable bowel     Ischemic colitis ([facility]     Mild tricuspid regurgitation 2013    Osteopenia of multiple sites 05/02/2022    major osteoporotic fracture risk is 20.7%, risk of hip fracture is 0.4%    Overactive bladder     follows with Urology, Dr. Harriette Rinne    Pneumonia     Prediabetes 03/24/2022    Recurrent major depressive disorder ([facility] 12/14/2017    follows with Psychiatry, Dr. Maryann Ann    Restless legs syndrome     Stage 3a chronic kidney disease ([facility]     Vitamin D deficiency        MEDICATIONS:   Current Outpatient Medications   Medication Sig Dispense Refill    rOPINIRole (REQUIP) 1 MG tablet TAKE ONE TABLET BY MOUTH nightly 90 tablet 0    lactobacillus (CULTURELLE) CAPS capsule Take 1 capsule by mouth daily (Patient taking differently: Take 1 capsule by mouth daily Will buy OTC today.) 30 capsule 0    fluticasone-umeclidin-vilant (TRELEGY ELLIPTA) 100-62.5-25 MCG/ACT AEPB inhaler Inhale 1 puff into the lungs daily 1 each 5    dilTIAZem (CARDIZEM CD) 120 MG extended release capsule Take 1 capsule by mouth daily 30 capsule 11    levothyroxine (SYNTHROID) 75 MCG tablet TAKE ONE TABLET BY MOUTH IN THE MORNING 90 tablet 0    Cholecalciferol (VITAMIN D3) 1.25 MG (02196 UT) CAPS Take 1 capsule by mouth once a week 12 capsule 1    montelukast (SINGULAIR) 10 MG tablet Take 1 tablet by mouth nightly 90 tablet 1    benazepril-hydrochlorthiazide (LOTENSIN HCT) 20-12.5 MG per tablet TAKE ONE TABLET BY MOUTH DAILY 90 tablet 1    PROVENTIL  (90 Base) MCG/ACT inhaler Inhale 2 puffs into the lungs every 6 hours as needed for Wheezing (wheezing) Wants the proventil. Will pay out of pocket. 1 each 3    oxyCODONE-acetaminophen (PERCOCET)  MG per tablet Take 1 tablet by mouth every 8 hours as needed. Citalopram Hydrobromide 30 MG CAPS Take 30 mg by mouth daily      fluticasone (FLONASE) 50 MCG/ACT nasal spray 2 sprays by Nasal route daily 1 Bottle 3    omeprazole (PRILOSEC) 40 MG capsule Take 1 capsule by mouth daily. 30 capsule 3    LORazepam (ATIVAN) 1 MG tablet Take 1 mg by mouth in the morning and at bedtime. No current facility-administered medications for this visit. SOCIAL AND OCCUPATIONAL HEALTH: Patient is a former smoker. She previously smoked 1/2 to 1 pack/day for approximately 40 years. She quit 2 to 3 years ago. She has no significant travel history. She does have 2 horses and is around hay, raccoons, a wood burner, and diesel smoke. Otherwise no known inhalational exposures.   SURGICAL HISTORY:   Past Surgical History:   Procedure Laterality Date    APPENDECTOMY N/A     CARDIOVASCULAR STRESS TEST N/A 02/2006    Dr. Maurice Allen; EF=70%    CHOLECYSTECTOMY N/A     COLONOSCOPY N/A 02/20/2013    Dr. Juan Lucero; ischemic colitis involving the descending and sigmoid colon, diverticulosis of the sigmoid colon    COLONOSCOPY N/A 05/12/2008    Dr. Maira Odell; internal hemorrhoids and spastic colon    ECHO COMPLETE N/A 02/19/2013    Dr. Dimple Syed; EF >79%-LKUQF I diastolic dysfunction, with mild TR and mild pulm HTN    TONSILLECTOMY Bilateral     UPPER GASTROINTESTINAL ENDOSCOPY N/A 02/19/2013    Dr. Deysi Albrecht; gastritis    UPPER GASTROINTESTINAL ENDOSCOPY N/A 11/15/2011       FAMILY HISTORY: No family history of cancer, blood clots     REVIEW OF SYSTEMS:  Constitutional: No fevers, chills, unintentional weight loss  Skin: No rashes or lesions  EENT: No change in vision, change in hearing, change in taste, change in smell. Positive for concern for thrush. Cardiovascular: Denies chest pain, chest pressure, palpitations. Respiration: Positive for shortness of breath with exertion, walking long distances. Gastrointestinal: Denies nausea, vomiting, diarrhea  Musculoskeletal: Denies joint or muscle pain  Neurological: Denies syncope, headache, seizures  Psychological: Denies anxiety or depression  Endocrine: Denies polyuria polydipsia  Hematopoietic/lymphatic: Denies easy bruising        PHYSICAL EXAMINATION:  Constitutional: Well-nourished, well-developed. No acute distress  EENT: PERRL, EOMI, no oropharyngeal erythema. No palpable adenopathy  Neck: Trachea and thyroid midline  Respiratory: Breath sounds are currently clear to auscultation bilaterally. There is no use of accessory muscles. Cardiovascular: Regular rate and rhythm, no murmurs rubs or gallops  Pulses: Equal bilaterally  Abdomen: Soft nontender bowel sounds present  Lymphatic: No palpable adenopathy  Musculoskeletal: Gait steady  Extremities: No clubbing, cyanosis, edema. Skin: No rashes or lesions  Neurological/Psychiatric: Neurologically intact, no focal deficits. Affect appropriate    DATA: Spirometry previously which demonstrated FVC of 1.66 L which is 65% of predicted. FEV1 of 0.83 L which is 41% of predicted. FEV1 FVC ratio is 50. There is a 24% bronchodilator response. MVV is 29 which is 36% of predicted. SVC is 1.50 which is 59% of predicted. Flow volume loop is consistent with combined obstruction and restriction.   Overall pulmonary function testing is consistent with severe COPD. MVV is significantly decreased which may be due to deconditioning versus neuromuscular weakness. Spirometry completed on 8/8/2022 shows:  FVC of 1.89 L which is 72% of predicted. FEV1 1.03 L which is 50% of predicted. FEV1 FVC ratio is 54. MVV was 43 which is 53% of predicted. SVC is 2.30 L which is 88% of predicted. Overall consistent with severe COPD however these are slightly improved from her PFTs that she had in February 2022    Low-dose screening CT of the chest was completed in May which showed stable bilateral 2 to 3 mm pulmonary nodules. IMPRESSION:       1. Severe COPD  2. Former tobacco use  3. Need for low-dose screening CT  4. Pulmonary hypertension  5. Thrush    PLAN:      1. Continue Trelegy  2. Albuterol rescue inhaler reordered. 3. Low dose CT of chest ordered for May 2023. 4. Patient is up to date on her flu and covid vaccines. 5. Nystatin mouth wash ordered. I hope this updates you on my evaluation and clinical thinking. Thank you for allowing me to participate in his care. Sincerely,        Mil Jose Rafaelluma Lopez.  Office: [phone]  Fax: [phone]      Low Dose CT (LDCT) Lung Screening criteria met:     Age 55-77(Medicare) or 50-80 (USPST)   Pack year smoking >30 (Medicare) or >20 (USPSTF)   Still smoking or less than 15 year since quit   No sign or symptoms of lung cancer   > 11 months since last LDCT     Risks and benefits of lung cancer screening with LDCT scans discussed:    Significance of positive screen - False-positive LDCT results often occur. 95% of all positive results do not lead to a diagnosis of cancer. Usually further imaging can resolve most false-positive results; however, some patients may require invasive procedures.     Over diagnosis risk - 10% to 12% of screen-detected lung cancer cases are over diagnosed--that is, the cancer would not have been detected in the patient's lifetime without the screening.    Need for follow up screens annually to continue lung cancer screening effectiveness     Risks associated with radiation from annual LDCT- Radiation exposure is about the same as for a mammogram, which is about 1/3 of the annual background radiation exposure from everyday life.  Starting screening at age 55 is not likely to increase cancer risk from radiation exposure.    Patients with comorbidities resulting in life expectancy of < 10 years, or that would preclude treatment of an abnormality identified on CT, should not be screened due to lack of benefit.    To obtain maximal benefit from this screening, smoking cessation and long-term abstinence from smoking is critical

## 2023-02-15 ENCOUNTER — HOSPITAL ENCOUNTER (OUTPATIENT)
Dept: CT IMAGING | Age: 68
Discharge: HOME | End: 2023-02-17
Payer: MEDICARE

## 2023-02-15 VITALS
SYSTOLIC BLOOD PRESSURE: 101 MMHG | HEIGHT: 61 IN | TEMPERATURE: 98.3 F | OXYGEN SATURATION: 93 % | BODY MASS INDEX: 31.15 KG/M2 | RESPIRATION RATE: 18 BRPM | WEIGHT: 165 LBS | DIASTOLIC BLOOD PRESSURE: 56 MMHG | HEART RATE: 52 BPM

## 2023-02-15 DIAGNOSIS — R06.09 DOE (DYSPNEA ON EXERTION): ICD-10-CM

## 2023-02-15 LAB
ANION GAP SERPL CALCULATED.3IONS-SCNC: 8 MMOL/L (ref 7–16)
BUN BLDV-MCNC: 22 MG/DL (ref 6–23)
CALCIUM SERPL-MCNC: 9.3 MG/DL (ref 8.6–10.2)
CHLORIDE BLD-SCNC: 98 MMOL/L (ref 98–107)
CO2: 28 MMOL/L (ref 22–29)
CREAT SERPL-MCNC: 1.2 MG/DL (ref 0.5–1)
GFR SERPL CREATININE-BSD FRML MDRD: 49 ML/MIN/1.73
GLUCOSE BLD-MCNC: 116 MG/DL (ref 74–99)
POTASSIUM SERPL-SCNC: 4.2 MMOL/L (ref 3.5–5)
SODIUM BLD-SCNC: 134 MMOL/L (ref 132–146)

## 2023-02-15 PROCEDURE — 36415 COLL VENOUS BLD VENIPUNCTURE: CPT

## 2023-02-15 PROCEDURE — 6370000000 HC RX 637 (ALT 250 FOR IP): Performed by: INTERNAL MEDICINE

## 2023-02-15 PROCEDURE — 6360000004 HC RX CONTRAST MEDICATION: Performed by: RADIOLOGY

## 2023-02-15 PROCEDURE — 7100000010 HC PHASE II RECOVERY - FIRST 15 MIN

## 2023-02-15 PROCEDURE — 2580000003 HC RX 258: Performed by: RADIOLOGY

## 2023-02-15 PROCEDURE — 7100000011 HC PHASE II RECOVERY - ADDTL 15 MIN

## 2023-02-15 PROCEDURE — 2580000003 HC RX 258: Performed by: INTERNAL MEDICINE

## 2023-02-15 PROCEDURE — 75574 CT ANGIO HRT W/3D IMAGE: CPT

## 2023-02-15 PROCEDURE — 80048 BASIC METABOLIC PNL TOTAL CA: CPT

## 2023-02-15 RX ORDER — METOPROLOL TARTRATE 50 MG/1
100 TABLET, FILM COATED ORAL ONCE
Status: COMPLETED | OUTPATIENT
Start: 2023-02-15 | End: 2023-02-15

## 2023-02-15 RX ORDER — 0.9 % SODIUM CHLORIDE 0.9 %
1000 INTRAVENOUS SOLUTION INTRAVENOUS ONCE
Status: COMPLETED | OUTPATIENT
Start: 2023-02-15 | End: 2023-02-15

## 2023-02-15 RX ORDER — NITROGLYCERIN 0.4 MG/1
0.8 TABLET SUBLINGUAL ONCE
Status: CANCELLED | OUTPATIENT
Start: 2023-02-15

## 2023-02-15 RX ORDER — METOPROLOL TARTRATE 5 MG/5ML
5 INJECTION INTRAVENOUS EVERY 5 MIN PRN
Status: DISCONTINUED | OUTPATIENT
Start: 2023-02-15 | End: 2023-02-18 | Stop reason: HOSPADM

## 2023-02-15 RX ORDER — SODIUM CHLORIDE 0.9 % (FLUSH) 0.9 %
10 SYRINGE (ML) INJECTION
Status: COMPLETED | OUTPATIENT
Start: 2023-02-15 | End: 2023-02-15

## 2023-02-15 RX ORDER — 0.9 % SODIUM CHLORIDE 0.9 %
500 INTRAVENOUS SOLUTION INTRAVENOUS ONCE
Status: COMPLETED | OUTPATIENT
Start: 2023-02-15 | End: 2023-02-15

## 2023-02-15 RX ORDER — NITROGLYCERIN 0.4 MG/1
0.8 TABLET SUBLINGUAL ONCE
Status: COMPLETED | OUTPATIENT
Start: 2023-02-15 | End: 2023-02-15

## 2023-02-15 RX ADMIN — METOPROLOL TARTRATE 100 MG: 50 TABLET, FILM COATED ORAL at 08:17

## 2023-02-15 RX ADMIN — SODIUM CHLORIDE 500 ML: 9 INJECTION, SOLUTION INTRAVENOUS at 09:33

## 2023-02-15 RX ADMIN — Medication 10 ML: at 10:22

## 2023-02-15 RX ADMIN — SODIUM CHLORIDE 1000 ML: 9 INJECTION, SOLUTION INTRAVENOUS at 08:17

## 2023-02-15 RX ADMIN — IOPAMIDOL 70 ML: 755 INJECTION, SOLUTION INTRAVENOUS at 10:22

## 2023-02-15 RX ADMIN — NITROGLYCERIN 0.8 MG: 0.4 TABLET SUBLINGUAL at 10:30

## 2023-02-15 NOTE — PROCEDURES
0730Patient arrived via for CTA coronary arteries. Allergies reviewed, instructions given to  include protocol for heart rate, b/p, necessary medications that will be given, IV site and proper breath hold during scan. Questions answered and expressed understanding confirmed. Placed on monitoring devices for heart rate and rhythm, B/P taken,  IV flushed / started, flushed and prn adapter attached. [id number] Dr Juan Pablo Cruz called, notified of pt admission, history, home medications, vs & rhythm. Also informed that unable to obtain a RAC piv, ok to use LAC. Orders received.

## 2023-02-15 NOTE — PROCEDURES
1045 return from coronary cta, denies discomfort/cp.   Vss  continue to monitor    1115 vss, denies cp/discomfort  d/c to home

## 2023-02-20 ENCOUNTER — TELEPHONE (OUTPATIENT)
Dept: CARDIOLOGY CLINIC | Age: 68
End: 2023-02-20

## 2023-02-20 NOTE — TELEPHONE ENCOUNTER
Patient notified of Coronary CTA results and Dr. Hair's recommendations. Patient asking if she is okay to undergo colonoscopy. Please advise.

## 2023-02-20 NOTE — TELEPHONE ENCOUNTER
----- Message from Mgai Marie MD sent at 2/19/2023 10:17 PM EST -----  CTA of the showed mild blockages, continue current meds and follow up with me as scheduled.

## 2023-03-01 DIAGNOSIS — I10 ESSENTIAL HYPERTENSION: ICD-10-CM

## 2023-03-02 RX ORDER — BENAZEPRIL HYDROCHLORIDE AND HYDROCHLOROTHIAZIDE 20; 12.5 MG/1; MG/1
TABLET ORAL
Qty: 90 TABLET | Refills: 0 | Status: SHIPPED | OUTPATIENT
Start: 2023-03-02

## 2023-03-16 ENCOUNTER — OFFICE VISIT (OUTPATIENT)
Dept: CARDIOLOGY CLINIC | Age: 68
End: 2023-03-16
Payer: MEDICARE

## 2023-03-16 VITALS
HEIGHT: 60 IN | WEIGHT: 159.7 LBS | RESPIRATION RATE: 18 BRPM | DIASTOLIC BLOOD PRESSURE: 60 MMHG | BODY MASS INDEX: 31.35 KG/M2 | HEART RATE: 77 BPM | SYSTOLIC BLOOD PRESSURE: 118 MMHG

## 2023-03-16 DIAGNOSIS — I47.1 SUPRAVENTRICULAR TACHYCARDIA (HCC): Primary | ICD-10-CM

## 2023-03-16 PROCEDURE — 3078F DIAST BP <80 MM HG: CPT | Performed by: INTERNAL MEDICINE

## 2023-03-16 PROCEDURE — G8417 CALC BMI ABV UP PARAM F/U: HCPCS | Performed by: INTERNAL MEDICINE

## 2023-03-16 PROCEDURE — 1090F PRES/ABSN URINE INCON ASSESS: CPT | Performed by: INTERNAL MEDICINE

## 2023-03-16 PROCEDURE — G8399 PT W/DXA RESULTS DOCUMENT: HCPCS | Performed by: INTERNAL MEDICINE

## 2023-03-16 PROCEDURE — 1036F TOBACCO NON-USER: CPT | Performed by: INTERNAL MEDICINE

## 2023-03-16 PROCEDURE — 3074F SYST BP LT 130 MM HG: CPT | Performed by: INTERNAL MEDICINE

## 2023-03-16 PROCEDURE — 3017F COLORECTAL CA SCREEN DOC REV: CPT | Performed by: INTERNAL MEDICINE

## 2023-03-16 PROCEDURE — 99214 OFFICE O/P EST MOD 30 MIN: CPT | Performed by: INTERNAL MEDICINE

## 2023-03-16 PROCEDURE — 93000 ELECTROCARDIOGRAM COMPLETE: CPT | Performed by: INTERNAL MEDICINE

## 2023-03-16 PROCEDURE — 1123F ACP DISCUSS/DSCN MKR DOCD: CPT | Performed by: INTERNAL MEDICINE

## 2023-03-16 PROCEDURE — G8427 DOCREV CUR MEDS BY ELIG CLIN: HCPCS | Performed by: INTERNAL MEDICINE

## 2023-03-16 PROCEDURE — G8484 FLU IMMUNIZE NO ADMIN: HCPCS | Performed by: INTERNAL MEDICINE

## 2023-03-16 RX ORDER — PRAVASTATIN SODIUM 20 MG
20 TABLET ORAL EVERY OTHER DAY
Qty: 45 TABLET | Refills: 1 | Status: SHIPPED | OUTPATIENT
Start: 2023-03-16

## 2023-03-16 NOTE — PROGRESS NOTES
OUTPATIENT CARDIOLOGY FOLLOW UP    Name: Swetha Bryant    Age: 75 y.o. Date of Service: 10/27/22    Reason for Consultation:  Chief Complaint   Patient presents with    Follow-up     1 month ov    Shortness of Breath    Dizziness       Referring Physician: Cassi Perkins DO    History of Present Illness:  Swetha Bryant is a 75 y.o. female who presents today for follow up of palpitations and shortness of breath  PMH includes former smoker with 75-pack-year history of smoking and quit in 2019, COPD, hypertension, hyperlipidemia, hyperglycemia, hypothyroidism on HRT, GERD, chronic pain syndrome with chronic neck lower back pain secondary to industrial accident, depression, GERD, patient was unable to tolerate statins due to muscle pains. She presents with complaints of dyspnea with exertion and gets winded if she walks 200 yards with associated faster heart rate/palpitations, she has to sit down and rest and it takes about several minutes to feel better. She also gets palpitations at the same time when she gets winded. Denies any chest pain or fatigue, syncope or presyncope. She quit smoking 15 year ago. Denies any illicit drugs or alcohol use. No family history of premature CAD. She was seen in the office on 10/27/2022, since last visit, she has not any further hospitalizations. She went to the ER on 1/20/2023 with an episode of colitis and did not require hospitalization. .  She had had a coronary CTA last month which showed mild nonobstructive CAD with a calcium score of 57. She is still having dyspnea with exertion and noticed palpitations and heart racing on getting up in the morning most of the time she noticed while she is still in the bed and does not happen every day but noticed most mornings and lasts for several minutes. She has no complaints of chest pain, lightheadedness, dizziness, or syncope. No history of PND or orthopnea.  She is currently with no active cardiac complaints at rest. SR on EKG.     Review of Systems:  Cardiac: As per HPI  General: No fever, chills  Pulmonary: As per HPI  HEENT: No visual disturbances, difficult swallowing  GI: No nausea, vomiting  Endocrine: No thyroid disease or DM  Musculoskeletal: STREET x 4, no focal motor deficits  Skin: Intact, no rashes  Neuro/Psych: No headache or seizures    Past Medical History:  Past Medical History:   Diagnosis Date    Allergic rhinitis     Chronic pain syndrome     follows with [facility], Dr. Avelina Donnelly    COPD (chronic obstructive pulmonary disease) ([facility]     follows with Pulmonology, Dr. Zara Aguayo    Diverticulosis 03/07/2023    GERD (gastroesophageal reflux disease)     Grade I diastolic dysfunction 0669    Hernia, hiatal 12/14/2017    Hyperlipidemia     Hypertension     Hypothyroidism 04/07/2017    Irritable bowel     Ischemic colitis ([facility]     Mild tricuspid regurgitation 2013    Osteopenia of multiple sites 05/02/2022    major osteoporotic fracture risk is 20.7%, risk of hip fracture is 0.4%    Overactive bladder     follows with Urology, Dr. Andino Gun    Pneumonia     Prediabetes 03/24/2022    Recurrent major depressive disorder ([facility] 12/14/2017    follows with Psychiatry, Dr. Cat Elias    Restless legs syndrome     Stage 3a chronic kidney disease ([facility]     Vitamin D deficiency        Past Surgical History:  Past Surgical History:   Procedure Laterality Date    APPENDECTOMY N/A     CARDIOVASCULAR STRESS TEST N/A 02/2006    Dr. Karie Bumpers; EF=70%    CHOLECYSTECTOMY N/A     COLONOSCOPY N/A 02/20/2013    Dr. Francy Pappas; ischemic colitis involving the descending and sigmoid colon, diverticulosis of the sigmoid colon    COLONOSCOPY N/A 05/12/2008    Dr. Ya Lozoya; internal hemorrhoids and spastic colon    COLONOSCOPY N/A 03/07/2023    Dr. Silvano Paredes; diverticulosis    ECHO COMPLETE N/A 02/19/2013    Dr. Sandhya Pompa; EF >40%-ZCVOJ I diastolic dysfunction, with mild TR and mild pulm HTN    TONSILLECTOMY Bilateral     UPPER GASTROINTESTINAL ENDOSCOPY N/A 02/19/2013    Dr. Grazyna Munroe; gastritis    UPPER GASTROINTESTINAL ENDOSCOPY N/A 11/15/2011       Family History:  Family History   Problem Relation Age of Onset    High Blood Pressure Mother     Thyroid Disease Mother     Hypertension Mother     Lung Cancer Father         with metastasis to his brain    Heart Disease Brother     Pacemaker Brother     COPD Brother     Breast Cancer Paternal Aunt     Breast Cancer Paternal Aunt     Obesity Maternal Grandmother     Heart Disease Paternal Grandmother        Social History:  Social History     Socioeconomic History    Marital status:      Spouse name: Not on file    Number of children: Not on file    Years of education: Not on file    Highest education level: Not on file   Occupational History    Not on file   Tobacco Use    Smoking status: Former     Packs/day: 1.00     Years: 45.00     Pack years: 45.00     Types: Cigarettes     Start date: 0     Quit date: 2020     Years since quitting: 3.2    Smokeless tobacco: Never   Vaping Use    Vaping Use: Former    Substances: Nicotine   Substance and Sexual Activity    Alcohol use: No    Drug use: Never    Sexual activity: Not Currently   Other Topics Concern    Not on file   Social History Narrative    Was disabled at work due to a fall. Worked at [employer] in [place], [place]. Currently on total disability. Social Determinants of Health     Financial Resource Strain: Low Risk     Difficulty of Paying Living Expenses: Not very hard   Food Insecurity: No Food Insecurity    Worried About Running Out of Food in the Last Year: Never true    Ran Out of Food in the Last Year: Never true   Transportation Needs: Unmet Transportation Needs    Lack of Transportation (Medical):  Not on file    Lack of Transportation (Non-Medical): Yes   Physical Activity: Not on file   Stress: Not on file   Social Connections: Not on file   Intimate Partner Violence: Not on file   Housing Stability: Unknown Unable to Pay for Housing in the Last Year: Not on file    Number of Places Lived in the Last Year: Not on file    Unstable Housing in the Last Year: No       Allergies: Allergies   Allergen Reactions    Celebrex [Celecoxib]     Simvastatin Myalgia    Avelox [Moxifloxacin Hcl In Nacl]     Biaxin [Clarithromycin]     Clarithromycin     Desvenlafaxine     Lipitor [Atorvastatin] Myalgia    Lyrica [Pregabalin]     Tetracyclines & Related     Zithromax [Azithromycin]     Sulfa Antibiotics Rash    Vioxx [Rofecoxib] Nausea And Vomiting       Current Medications:  Current Outpatient Medications   Medication Sig Dispense Refill    benazepril-hydrochlorthiazide (LOTENSIN HCT) 20-12.5 MG per tablet TAKE ONE TABLET BY MOUTH DAILY 90 tablet 0    levothyroxine (SYNTHROID) 75 MCG tablet TAKE ONE TABLET BY MOUTH IN THE MORNING 90 tablet 1    albuterol sulfate HFA (VENTOLIN HFA) 108 (90 Base) MCG/ACT inhaler Inhale 2 puffs into the lungs every 6 hours as needed for Wheezing 18 g 3    rOPINIRole (REQUIP) 1 MG tablet TAKE ONE TABLET BY MOUTH nightly 90 tablet 0    fluticasone-umeclidin-vilant (TRELEGY ELLIPTA) 100-62.5-25 MCG/ACT AEPB inhaler Inhale 1 puff into the lungs daily 1 each 5    dilTIAZem (CARDIZEM CD) 120 MG extended release capsule Take 1 capsule by mouth daily 30 capsule 11    Cholecalciferol (VITAMIN D3) 1.25 MG (48249 UT) CAPS Take 1 capsule by mouth once a week 12 capsule 1    montelukast (SINGULAIR) 10 MG tablet Take 1 tablet by mouth nightly 90 tablet 1    PROVENTIL  (90 Base) MCG/ACT inhaler Inhale 2 puffs into the lungs every 6 hours as needed for Wheezing (wheezing) Wants the proventil. Will pay out of pocket. 1 each 3    oxyCODONE-acetaminophen (PERCOCET)  MG per tablet Take 1 tablet by mouth every 8 hours as needed.       Citalopram Hydrobromide 30 MG CAPS Take 30 mg by mouth daily      fluticasone (FLONASE) 50 MCG/ACT nasal spray 2 sprays by Nasal route daily 1 Bottle 3    omeprazole (PRILOSEC) 40 MG capsule Take 1 capsule by mouth daily. 30 capsule 3    LORazepam (ATIVAN) 1 MG tablet Take 1 mg by mouth in the morning and at bedtime. No current facility-administered medications for this visit. Physical Exam:  /60   Pulse 77   Resp 18   Ht 5' (1.524 m)   Wt 159 lb 11.2 oz (72.4 kg)   BMI 31.19 kg/m²   Wt Readings from Last 3 Encounters:   03/16/23 159 lb 11.2 oz (72.4 kg)   02/15/23 165 lb (74.8 kg)   02/03/23 164 lb 12.8 oz (74.8 kg)     Appearance: Awake, alert, no acute respiratory distress  Skin: Intact, no rash  Head: Normocephalic, atraumatic  Eyes: EOMI, no conjunctival erythema  ENMT: No pharyngeal erythema, MMM, no rhinorrhea  Neck: Supple, no elevated JVP, no carotid bruits  Lungs: Clear to auscultation bilaterally. No wheezes, rales, or rhonchi.   Cardiac: Regular rate and rhythm, +S1S2, no murmurs apparent  Abdomen: Soft, nontender, +bowel sounds  Extremities: Moves all extremities x 4, no lower extremity edema  Neurologic: No focal motor deficits apparent, normal mood and affect  Peripheral Pulses: Intact posterior tibial pulses bilaterally    Laboratory Tests:  Lab Results   Component Value Date    CREATININE 1.2 (H) 02/15/2023    BUN 22 02/15/2023     02/15/2023    K 4.2 02/15/2023    CL 98 02/15/2023    CO2 28 02/15/2023     Lab Results   Component Value Date/Time    MG 1.9 01/25/2023 02:55 AM     Lab Results   Component Value Date    WBC 8.5 01/26/2023    HGB 12.1 01/26/2023    HCT 36.6 01/26/2023    MCV 88.4 01/26/2023     01/26/2023     Lab Results   Component Value Date    ALT 27 01/26/2023    AST 35 (H) 01/26/2023    ALKPHOS 59 01/26/2023    BILITOT 0.4 01/26/2023     Lab Results   Component Value Date    CKTOTAL 101 02/19/2013    CKMB 1.8 02/19/2013    TROPONINI <0.01 02/19/2013    TROPONINI <0.01 02/19/2013    TROPONINI <0.01 02/19/2013     Lab Results   Component Value Date    INR 1.2 02/19/2013    PROTIME 12.7 02/19/2013     Lab Results   Component Value Date    TSH 2.550 01/17/2023     Lab Results   Component Value Date    LABA1C 5.6 01/17/2023     No results found for: EAG  Lab Results   Component Value Date    CHOL 234 (H) 01/17/2023    CHOL 191 03/24/2022    CHOL 210 (H) 08/06/2021     Lab Results   Component Value Date    TRIG 110 01/17/2023    TRIG 124 03/24/2022    TRIG 171 (H) 08/06/2021     Lab Results   Component Value Date    HDL 59 01/17/2023    HDL 55 03/24/2022    HDL 49 08/06/2021     Lab Results   Component Value Date    LDLCALC 153 (H) 01/17/2023    LDLCALC 111 (H) 03/24/2022    LDLCALC 127 (H) 08/06/2021     Lab Results   Component Value Date    LABVLDL 22 01/17/2023    LABVLDL 25 03/24/2022    LABVLDL 34 08/06/2021     Lab Results   Component Value Date    CHOLHDLRATIO 3.8 06/09/2020    CHOLHDLRATIO 3.3 12/10/2019    CHOLHDLRATIO 3.0 06/10/2019       Cardiac Tests:  ECG: Normal sinus rhythm, normal EKG    Chest X-ray:       Echocardiogram- 9/26/22:   Normal left ventricular systolic function. Ejection fraction is visually estimated at 60%. Normal right ventricular size and function (TAPSE 2.4 cm). Physiologic and/or trace tricuspid regurgitation. PASP is estimated at 24 mmHg. Stress test:        Cardiac catheterization:     Coronary CTA with a calcium score 2/16/2023:  1. The left main coronary artery has no significant stenosis. 2. The LAD has 20 to 30% stenosis in the proximal segment with calcified   plaque. 3. The ramus intermedius artery has no significant stenosis. 4. The left circumflex artery has no significant stenosis. 5. The right coronary artery has 30% stenosis in the proximal segment. 6. Normal LV size       7.  Total Calcium score of 57 with mild plaque burden (this was previously reported         The 10-year ASCVD risk score (Keisha DK, et al., 2019) is: 9.1%    Values used to calculate the score:      Age: 75 years      Sex: Female      Is Non- : No      Diabetic: No      Tobacco smoker: No      Systolic Blood Pressure: 179 mmHg      Is BP treated: Yes      HDL Cholesterol: 59 mg/dL      Total Cholesterol: 234 mg/dL    ASSESSMENT:  Exertional dyspnea with palpitations, rule out ischemia. COPD is also contributing to GALLEGOS  Palpitations secondary to short runs of SVT. CAD with non obstructive disease, CAC score 57  Severe COPD, stable  HTN, well controlled  HLD, intolerance to statins she tried simvastatin, atorvastatin and rosuvastatin. Former smoker, quit at the age of 47  Hypothyroidism on HRT  GERD  Chronic pain syndrome with chronic lower back and neck pain  Depression    Plan:   Recent Holter monitor results done in November 2022 were reviewed, which showed few runs of nonsustained SVT. Patient was advised to take Cardizem CD in the evening rather than in the morning. If she continues to have any more symptoms then will consider increasing Cardizem CD to 180 mg p.o. daily. Coronary CTA results were reviewed, total calcium score was 57 with 69 percentile score. It also showed 20 to 30% stenosis in the proximal RCA and 20 to 30% stenosis in the LAD. Patient was recommended to start on statin therapy however, she was intolerant to multiple statins based on her history,  could not tolerate simvastatin atorvastatin or rosuvastatin. I would recommend adding pravastatin 20 mg p.o. every other day due to underlying nonobstructive CAD. Huong Bobo Continue  benazepril hydrochlorothiazide 20/12.5 daily for hypertension  Continue rest of the current medications  Echo results reviewed as above. Follow-up with me in 4 months    Thank you for allowing me to participate in your patient's care. Please feel free to contact me if you have any questions or concerns.     Tab Villasenor MD  [facility]) Cardiology

## 2023-03-16 NOTE — PATIENT INSTRUCTIONS
Recent Holter monitor results done in November 2022 were reviewed, which showed few runs of nonsustained SVT. Patient was advised to take Cardizem CD in the evening rather than in the morning. If she continues to have any more symptoms then will consider increasing Cardizem CD to 180 mg p.o. daily. Coronary CTA results were reviewed, total calcium score was 57 with 69 percentile score. It also showed 20 to 30% stenosis in the proximal RCA and 20 to 30% stenosis in the LAD. Patient was recommended to start on statin therapy however, she was intolerant to multiple statins based on her history could not tolerate simvastatin atorvastatin or rosuvastatin. I would recommend adding pravastatin 20 mg p.o. every other day due to underlying nonobstructive CAD. Pink Anton Continue  benazepril hydrochlorothiazide 20/12.5 daily for hypertension  Continue rest of the current medications  Echo results reviewed as above.    Follow-up with me in 4 months

## 2023-03-24 ENCOUNTER — OFFICE VISIT (OUTPATIENT)
Dept: FAMILY MEDICINE CLINIC | Age: 68
End: 2023-03-24

## 2023-03-24 VITALS
SYSTOLIC BLOOD PRESSURE: 120 MMHG | TEMPERATURE: 97.2 F | OXYGEN SATURATION: 94 % | BODY MASS INDEX: 31.05 KG/M2 | HEART RATE: 63 BPM | WEIGHT: 159 LBS | DIASTOLIC BLOOD PRESSURE: 76 MMHG

## 2023-03-24 DIAGNOSIS — B96.89 ACUTE BACTERIAL SINUSITIS: ICD-10-CM

## 2023-03-24 DIAGNOSIS — H92.03 OTALGIA OF BOTH EARS: Primary | ICD-10-CM

## 2023-03-24 DIAGNOSIS — J01.90 ACUTE BACTERIAL SINUSITIS: ICD-10-CM

## 2023-03-24 RX ORDER — PREDNISONE 10 MG/1
10 TABLET ORAL 2 TIMES DAILY
Qty: 10 TABLET | Refills: 0 | Status: SHIPPED | OUTPATIENT
Start: 2023-03-24 | End: 2023-03-29

## 2023-03-24 RX ORDER — AMOXICILLIN 875 MG/1
875 TABLET, COATED ORAL 2 TIMES DAILY
Qty: 14 TABLET | Refills: 0 | Status: SHIPPED | OUTPATIENT
Start: 2023-03-24 | End: 2023-03-31

## 2023-03-24 RX ORDER — MECLIZINE HCL 12.5 MG/1
12.5 TABLET ORAL 3 TIMES DAILY PRN
Qty: 15 TABLET | Refills: 0 | Status: SHIPPED | OUTPATIENT
Start: 2023-03-24 | End: 2023-04-03

## 2023-03-24 RX ORDER — AZELASTINE 1 MG/ML
1 SPRAY, METERED NASAL 2 TIMES DAILY
Qty: 60 ML | Refills: 1 | Status: SHIPPED | OUTPATIENT
Start: 2023-03-24

## 2023-03-24 ASSESSMENT — ENCOUNTER SYMPTOMS
SINUS PRESSURE: 1
SINUS PAIN: 1

## 2023-03-24 NOTE — PROGRESS NOTES
Marla Ha (:  1955) is a 75 y.o. female,Established patient, here for evaluation of the following chief complaint(s):  Otalgia (Bilateral ears), Dizziness, and Drainage         ASSESSMENT/PLAN:  1. Otalgia of both ears  -     amoxicillin (AMOXIL) 875 MG tablet; Take 1 tablet by mouth 2 times daily for 7 days, Disp-14 tablet, R-0Normal  -     azelastine (ASTELIN) 0.1 % nasal spray; 1 spray by Nasal route 2 times daily Use in each nostril as directed, Disp-60 mL, R-1Normal  -     predniSONE (DELTASONE) 10 MG tablet; Take 1 tablet by mouth 2 times daily for 5 days, Disp-10 tablet, R-0Normal  -     meclizine (ANTIVERT) 12.5 MG tablet; Take 1 tablet by mouth 3 times daily as needed for Dizziness, Disp-15 tablet, R-0Normal  2. Acute bacterial sinusitis  -     amoxicillin (AMOXIL) 875 MG tablet; Take 1 tablet by mouth 2 times daily for 7 days, Disp-14 tablet, R-0Normal  -     azelastine (ASTELIN) 0.1 % nasal spray; 1 spray by Nasal route 2 times daily Use in each nostril as directed, Disp-60 mL, R-1Normal  -     predniSONE (DELTASONE) 10 MG tablet; Take 1 tablet by mouth 2 times daily for 5 days, Disp-10 tablet, R-0Normal  -     meclizine (ANTIVERT) 12.5 MG tablet; Take 1 tablet by mouth 3 times daily as needed for Dizziness, Disp-15 tablet, R-0Normal  We will treat symptomatically. Follow-up with PCP in 1 to 2 weeks to ensure resolution. Red flags discussed with patient if these occur she is to go directly to the nearest emergency department. Patient voiced understanding. No follow-ups on file. Subjective   SUBJECTIVE/OBJECTIVE:  HPI  Patient presents today for several day history of sinus congestion/drainage, bilateral ear pain, and intermittent dizziness. No nausea or vomiting. No fever or chills. No loss of taste or smell. No chest pain or shortness of breath. Review of Systems   HENT:  Positive for congestion, ear pain, sinus pressure and sinus pain.     Neurological:  Positive for

## 2023-04-26 DIAGNOSIS — J43.1 PANLOBULAR EMPHYSEMA (HCC): ICD-10-CM

## 2023-04-26 DIAGNOSIS — G25.81 RESTLESS LEG SYNDROME: ICD-10-CM

## 2023-04-26 NOTE — TELEPHONE ENCOUNTER
Medication Refill Request    LOV 2/3/2023  NOV 8/10/2023    Lab Results   Component Value Date    CREATININE 1.2 (H) 02/15/2023

## 2023-04-27 RX ORDER — ROPINIROLE 1 MG/1
TABLET, FILM COATED ORAL
Qty: 90 TABLET | Refills: 1 | Status: SHIPPED | OUTPATIENT
Start: 2023-04-27

## 2023-04-27 RX ORDER — MONTELUKAST SODIUM 10 MG/1
TABLET ORAL
Qty: 90 TABLET | Refills: 1 | Status: SHIPPED | OUTPATIENT
Start: 2023-04-27

## 2023-05-09 ENCOUNTER — HOSPITAL ENCOUNTER (OUTPATIENT)
Age: 68
Discharge: HOME | End: 2023-05-09

## 2023-05-09 LAB
ALBUMIN SERPL-MCNC: 3.9 G/DL (ref 3.5–5.2)
ALP SERPL-CCNC: 77 U/L (ref 35–104)
ALT SERPL-CCNC: 32 U/L (ref 0–32)
ANION GAP SERPL CALCULATED.3IONS-SCNC: 12 MMOL/L (ref 7–16)
AST SERPL-CCNC: 34 U/L (ref 0–31)
BASOPHILS # BLD: 0.06 E9/L (ref 0–0.2)
BASOPHILS NFR BLD: 1 % (ref 0–2)
BILIRUB SERPL-MCNC: 0.6 MG/DL (ref 0–1.2)
BUN SERPL-MCNC: 13 MG/DL (ref 6–23)
CALCIUM SERPL-MCNC: 9.5 MG/DL (ref 8.6–10.2)
CHLORIDE SERPL-SCNC: 100 MMOL/L (ref 98–107)
CHOLESTEROL, TOTAL: 326 MG/DL (ref 0–199)
CO2 SERPL-SCNC: 26 MMOL/L (ref 22–29)
CREAT SERPL-MCNC: 1.2 MG/DL (ref 0.5–1)
CREAT UR-MCNC: 159 MG/DL (ref 29–226)
EOSINOPHIL # BLD: 0.15 E9/L (ref 0.05–0.5)
EOSINOPHIL NFR BLD: 2.4 % (ref 0–6)
ERYTHROCYTE [DISTWIDTH] IN BLOOD BY AUTOMATED COUNT: 12.5 FL (ref 11.5–15)
GLUCOSE SERPL-MCNC: 102 MG/DL (ref 74–99)
HCT VFR BLD AUTO: 41 % (ref 34–48)
HDLC SERPL-MCNC: 56 MG/DL
HGB BLD-MCNC: 13.5 G/DL (ref 11.5–15.5)
IMM GRANULOCYTES # BLD: 0.02 E9/L
IMM GRANULOCYTES NFR BLD: 0.3 % (ref 0–5)
LDLC SERPL CALC-MCNC: 241 MG/DL (ref 0–99)
LYMPHOCYTES # BLD: 2.15 E9/L (ref 1.5–4)
LYMPHOCYTES NFR BLD: 34.3 % (ref 20–42)
MCH RBC QN AUTO: 29 PG (ref 26–35)
MCHC RBC AUTO-ENTMCNC: 32.9 % (ref 32–34.5)
MCV RBC AUTO: 88.2 FL (ref 80–99.9)
MICROALBUMIN UR-MCNC: <12 MG/L
MICROALBUMIN/CREAT UR-RTO: ABNORMAL (ref 0–30)
MONOCYTES # BLD: 0.7 E9/L (ref 0.1–0.95)
MONOCYTES NFR BLD: 11.2 % (ref 2–12)
NEUTROPHILS # BLD: 3.18 E9/L (ref 1.8–7.3)
NEUTS SEG NFR BLD: 50.8 % (ref 43–80)
PLATELET # BLD AUTO: 293 E9/L (ref 130–450)
PMV BLD AUTO: 9.3 FL (ref 7–12)
POTASSIUM SERPL-SCNC: 4.1 MMOL/L (ref 3.5–5)
PROT SERPL-MCNC: 6.9 G/DL (ref 6.4–8.3)
RBC # BLD AUTO: 4.65 E12/L (ref 3.5–5.5)
SODIUM SERPL-SCNC: 138 MMOL/L (ref 132–146)
TRIGL SERPL-MCNC: 144 MG/DL (ref 0–149)
TSH SERPL-MCNC: 1.17 UIU/ML (ref 0.27–4.2)
VITAMIN D 25-HYDROXY: 76 NG/ML (ref 30–100)
VLDLC SERPL CALC-MCNC: 29 MG/DL
WBC # BLD: 6.3 E9/L (ref 4.5–11.5)

## 2023-05-09 PROCEDURE — 82306 VITAMIN D 25 HYDROXY: CPT

## 2023-05-09 PROCEDURE — 82570 ASSAY OF URINE CREATININE: CPT

## 2023-05-09 PROCEDURE — 80061 LIPID PANEL: CPT

## 2023-05-09 PROCEDURE — 82044 UR ALBUMIN SEMIQUANTITATIVE: CPT

## 2023-05-09 PROCEDURE — 84443 ASSAY THYROID STIM HORMONE: CPT

## 2023-05-09 PROCEDURE — 83036 HEMOGLOBIN GLYCOSYLATED A1C: CPT

## 2023-05-09 PROCEDURE — 85025 COMPLETE CBC W/AUTO DIFF WBC: CPT

## 2023-05-09 PROCEDURE — 80053 COMPREHEN METABOLIC PANEL: CPT

## 2023-05-09 PROCEDURE — 36415 COLL VENOUS BLD VENIPUNCTURE: CPT

## 2023-05-10 ENCOUNTER — HOSPITAL ENCOUNTER (EMERGENCY)
Age: 68
Discharge: HOME | End: 2023-05-10
Attending: EMERGENCY MEDICINE
Payer: MEDICARE

## 2023-05-10 ENCOUNTER — APPOINTMENT (OUTPATIENT)
Dept: GENERAL RADIOLOGY | Age: 68
End: 2023-05-10
Payer: MEDICARE

## 2023-05-10 VITALS
HEART RATE: 76 BPM | WEIGHT: 165 LBS | OXYGEN SATURATION: 96 % | HEIGHT: 61 IN | SYSTOLIC BLOOD PRESSURE: 130 MMHG | DIASTOLIC BLOOD PRESSURE: 70 MMHG | RESPIRATION RATE: 20 BRPM | BODY MASS INDEX: 31.15 KG/M2 | TEMPERATURE: 98.2 F

## 2023-05-10 DIAGNOSIS — S93.402A MODERATE LEFT ANKLE SPRAIN, INITIAL ENCOUNTER: Primary | ICD-10-CM

## 2023-05-10 LAB — HBA1C MFR BLD: 5.8 % (ref 4–5.6)

## 2023-05-10 PROCEDURE — 73630 X-RAY EXAM OF FOOT: CPT

## 2023-05-10 PROCEDURE — 99283 EMERGENCY DEPT VISIT LOW MDM: CPT

## 2023-05-10 PROCEDURE — 73610 X-RAY EXAM OF ANKLE: CPT

## 2023-05-10 ASSESSMENT — PAIN - FUNCTIONAL ASSESSMENT
PAIN_FUNCTIONAL_ASSESSMENT: PREVENTS OR INTERFERES SOME ACTIVE ACTIVITIES AND ADLS
PAIN_FUNCTIONAL_ASSESSMENT: 0-10

## 2023-05-10 ASSESSMENT — PAIN DESCRIPTION - FREQUENCY: FREQUENCY: CONTINUOUS

## 2023-05-10 ASSESSMENT — PAIN SCALES - GENERAL: PAINLEVEL_OUTOF10: 9

## 2023-05-10 ASSESSMENT — PAIN DESCRIPTION - ONSET: ONSET: ON-GOING

## 2023-05-10 ASSESSMENT — PAIN DESCRIPTION - DESCRIPTORS: DESCRIPTORS: ACHING

## 2023-05-10 ASSESSMENT — PAIN DESCRIPTION - PAIN TYPE: TYPE: ACUTE PAIN

## 2023-05-10 ASSESSMENT — PAIN DESCRIPTION - ORIENTATION: ORIENTATION: LEFT

## 2023-05-10 ASSESSMENT — PAIN DESCRIPTION - LOCATION: LOCATION: ANKLE;FOOT

## 2023-05-11 NOTE — ED PROVIDER NOTES
HPI:  5/10/23,   Time: 10:49 PM EDT         Roxana Rutledge is a 70 y.o. female presenting to the ED for left ankle sprain  , beginning today ago. The complaint has been persistent, mild in severity, and worsened by nothing. Patient is no proximal pain  Intact tendons and intact she is weightbearing on the left ankle has no other trauma did not it was that she just twisted her ankle she did not hit her head no bruising swelling or deformity noted good pulses    ROS:   Pertinent positives and negatives are stated within HPI, all other systems reviewed and are negative.  --------------------------------------------- PAST HISTORY ---------------------------------------------  Past Medical History:  has a past medical history of Allergic rhinitis, Chronic pain syndrome, COPD (chronic obstructive pulmonary disease) ([address] Diverticulosis, GERD (gastroesophageal reflux disease), Grade I diastolic dysfunction, Hernia, hiatal, Hyperlipidemia, Hypertension, Hypothyroidism, Irritable bowel, Ischemic colitis ([address] Mild tricuspid regurgitation, Osteopenia of multiple sites, Overactive bladder, Pneumonia, Prediabetes, Recurrent major depressive disorder ([address] Restless legs syndrome, Stage 3a chronic kidney disease ([address] and Vitamin D deficiency. Past Surgical History:  has a past surgical history that includes Appendectomy (N/A); Tonsillectomy (Bilateral); Cholecystectomy (N/A); Echo Complete (N/A, 02/19/2013); Colonoscopy (N/A, 02/20/2013); Colonoscopy (N/A, 05/12/2008); Upper gastrointestinal endoscopy (N/A, 02/19/2013); cardiovascular stress test (N/A, 02/2006); Upper gastrointestinal endoscopy (N/A, 11/15/2011); and Colonoscopy (N/A, 03/07/2023). Social History:  reports that she quit smoking about 3 years ago. Her smoking use included cigarettes. She started smoking about 51 years ago. She has a 45.00 pack-year smoking history.  She has never used smokeless tobacco. She reports that she does not drink alcohol

## 2023-06-19 ENCOUNTER — TELEPHONE (OUTPATIENT)
Dept: CASE MANAGEMENT | Age: 68
End: 2023-06-19

## 2023-06-19 NOTE — TELEPHONE ENCOUNTER
No call, encounter opened to process CT Lung Screening. CT Lung Screen: 6/14/2023    IMPRESSION:  Emphysematous changes in both lungs with bilateral pulmonary nodules, both  solid and ground-glass. Solid nodules measure between 2 and 4 mm in  diameter. A right upper lobe 8 mm ground-glass nodule is also present. These findings correspond with a Lung rads category 2 and a 1 year follow-up  lung cancer screening chest CT is recommended. Additional, incidental findings, including coronary artery calcifications  which may be a marker for coronary artery disease. LUNG RADS:  Lung-RADS 2 - Benign ()     Management:  12 month screening LDCT     RECOMMENDATIONS:  If you would like to register your patient with the [facility] [facility], please contact the Nurse Navigator at  [phone]. Pack years: 39    Social History     Tobacco Use  Smoking Status: Former Smoker    Start Date: 1972   Quit Date: 2020   Types: Cigarettes   Packs/Day: 1   Years: 39   Pack Years: 39   Smokeless Tobacco:Never         Results letter sent to patient via my chart or mailed.      [address]

## 2023-06-29 ENCOUNTER — OFFICE VISIT (OUTPATIENT)
Dept: CARDIOLOGY CLINIC | Age: 68
End: 2023-06-29
Payer: MEDICARE

## 2023-06-29 VITALS
RESPIRATION RATE: 18 BRPM | HEIGHT: 60 IN | WEIGHT: 162 LBS | BODY MASS INDEX: 31.8 KG/M2 | SYSTOLIC BLOOD PRESSURE: 108 MMHG | HEART RATE: 65 BPM | DIASTOLIC BLOOD PRESSURE: 70 MMHG

## 2023-06-29 DIAGNOSIS — I47.1 SUPRAVENTRICULAR TACHYCARDIA (HCC): Primary | ICD-10-CM

## 2023-06-29 PROCEDURE — 99214 OFFICE O/P EST MOD 30 MIN: CPT | Performed by: INTERNAL MEDICINE

## 2023-06-29 PROCEDURE — 3074F SYST BP LT 130 MM HG: CPT | Performed by: INTERNAL MEDICINE

## 2023-06-29 PROCEDURE — 3017F COLORECTAL CA SCREEN DOC REV: CPT | Performed by: INTERNAL MEDICINE

## 2023-06-29 PROCEDURE — G8417 CALC BMI ABV UP PARAM F/U: HCPCS | Performed by: INTERNAL MEDICINE

## 2023-06-29 PROCEDURE — G8399 PT W/DXA RESULTS DOCUMENT: HCPCS | Performed by: INTERNAL MEDICINE

## 2023-06-29 PROCEDURE — 3078F DIAST BP <80 MM HG: CPT | Performed by: INTERNAL MEDICINE

## 2023-06-29 PROCEDURE — 1123F ACP DISCUSS/DSCN MKR DOCD: CPT | Performed by: INTERNAL MEDICINE

## 2023-06-29 PROCEDURE — 1036F TOBACCO NON-USER: CPT | Performed by: INTERNAL MEDICINE

## 2023-06-29 PROCEDURE — 1090F PRES/ABSN URINE INCON ASSESS: CPT | Performed by: INTERNAL MEDICINE

## 2023-06-29 PROCEDURE — 93000 ELECTROCARDIOGRAM COMPLETE: CPT | Performed by: INTERNAL MEDICINE

## 2023-06-29 PROCEDURE — G8427 DOCREV CUR MEDS BY ELIG CLIN: HCPCS | Performed by: INTERNAL MEDICINE

## 2023-06-29 RX ORDER — CYANOCOBALAMIN/FOLIC AC/VIT B6 1-2.2-25MG
1 TABLET ORAL DAILY
COMMUNITY
Start: 2023-06-15

## 2023-06-29 RX ORDER — AMLODIPINE BESYLATE 5 MG/1
TABLET ORAL
COMMUNITY
End: 2023-06-29 | Stop reason: ALTCHOICE

## 2023-06-29 RX ORDER — TIZANIDINE 2 MG/1
TABLET ORAL
COMMUNITY
Start: 2023-06-13

## 2023-07-24 ENCOUNTER — HOSPITAL ENCOUNTER (OUTPATIENT)
Age: 68
Discharge: HOME | End: 2023-07-24
Payer: MEDICARE

## 2023-07-24 LAB
CHOLEST SERPL-MCNC: 241 MG/DL
HDLC SERPL-MCNC: 56 MG/DL
LDLC SERPL CALC-MCNC: 156 MG/DL
TRIGL SERPL-MCNC: 144 MG/DL
VLDLC SERPL CALC-MCNC: 29 MG/DL

## 2023-07-24 PROCEDURE — 80061 LIPID PANEL: CPT

## 2023-07-24 PROCEDURE — 36415 COLL VENOUS BLD VENIPUNCTURE: CPT

## 2023-08-02 ENCOUNTER — TELEPHONE (OUTPATIENT)
Dept: CARDIOLOGY CLINIC | Age: 68
End: 2023-08-02

## 2023-08-02 NOTE — TELEPHONE ENCOUNTER
Patient called stating she does not think she could mentally handle using Repatha. She also states she will be in the donut hole soon and she will not be able to afford it. She does not quality for prescription assistance.

## 2023-08-08 ENCOUNTER — OFFICE VISIT (OUTPATIENT)
Dept: PULMONOLOGY | Age: 68
End: 2023-08-08
Payer: MEDICARE

## 2023-08-08 VITALS
BODY MASS INDEX: 31.41 KG/M2 | DIASTOLIC BLOOD PRESSURE: 57 MMHG | SYSTOLIC BLOOD PRESSURE: 119 MMHG | RESPIRATION RATE: 16 BRPM | WEIGHT: 160 LBS | HEIGHT: 60 IN | HEART RATE: 78 BPM | OXYGEN SATURATION: 93 % | TEMPERATURE: 97.2 F

## 2023-08-08 DIAGNOSIS — R06.09 DOE (DYSPNEA ON EXERTION): Primary | ICD-10-CM

## 2023-08-08 DIAGNOSIS — R91.1 LUNG NODULE: ICD-10-CM

## 2023-08-08 DIAGNOSIS — J44.9 CHRONIC OBSTRUCTIVE PULMONARY DISEASE, UNSPECIFIED COPD TYPE (HCC): ICD-10-CM

## 2023-08-08 PROCEDURE — G8417 CALC BMI ABV UP PARAM F/U: HCPCS | Performed by: INTERNAL MEDICINE

## 2023-08-08 PROCEDURE — G8428 CUR MEDS NOT DOCUMENT: HCPCS | Performed by: INTERNAL MEDICINE

## 2023-08-08 PROCEDURE — 1090F PRES/ABSN URINE INCON ASSESS: CPT | Performed by: INTERNAL MEDICINE

## 2023-08-08 PROCEDURE — 3078F DIAST BP <80 MM HG: CPT | Performed by: INTERNAL MEDICINE

## 2023-08-08 PROCEDURE — 1036F TOBACCO NON-USER: CPT | Performed by: INTERNAL MEDICINE

## 2023-08-08 PROCEDURE — 3023F SPIROM DOC REV: CPT | Performed by: INTERNAL MEDICINE

## 2023-08-08 PROCEDURE — 3074F SYST BP LT 130 MM HG: CPT | Performed by: INTERNAL MEDICINE

## 2023-08-08 PROCEDURE — 1123F ACP DISCUSS/DSCN MKR DOCD: CPT | Performed by: INTERNAL MEDICINE

## 2023-08-08 PROCEDURE — 3017F COLORECTAL CA SCREEN DOC REV: CPT | Performed by: INTERNAL MEDICINE

## 2023-08-08 PROCEDURE — G8399 PT W/DXA RESULTS DOCUMENT: HCPCS | Performed by: INTERNAL MEDICINE

## 2023-08-08 PROCEDURE — 99213 OFFICE O/P EST LOW 20 MIN: CPT | Performed by: INTERNAL MEDICINE

## 2023-08-08 RX ORDER — IPRATROPIUM BROMIDE AND ALBUTEROL SULFATE 2.5; .5 MG/3ML; MG/3ML
1 SOLUTION RESPIRATORY (INHALATION) EVERY 6 HOURS PRN
Qty: 360 ML | Refills: 1 | Status: SHIPPED | OUTPATIENT
Start: 2023-08-08

## 2023-08-08 RX ORDER — ALBUTEROL SULFATE 90 UG/1
2 AEROSOL, METERED RESPIRATORY (INHALATION) EVERY 6 HOURS PRN
Qty: 18 G | Refills: 3 | Status: SHIPPED | OUTPATIENT
Start: 2023-08-08

## 2023-08-08 NOTE — PATIENT INSTRUCTIONS
[address] [place] [address]  Ethan Barrera[address] [facility]  Office: [phone]      Your were seen in the office today for COPD      We  did make changes to your medications today. Hiwot added  We increased the dose of the Trelegy    Testing ordered today was CT scan of the chest due Dec 2023      Vaccines recommended none (up to date)               Please do not hesitate to call the office with any questions.

## 2023-08-08 NOTE — PROGRESS NOTES
Patient to follow up with physician in 5 months. Patient given a nebulizer during office visit under the direction of Dr. Nash Lundborg. Patient also given a letter for a parking pass during office visit under the direction of Dr. Nash Lundborg. Medications were refilled and trelegy dosage was increased.

## 2023-08-08 NOTE — PROGRESS NOTES
[address] [facility]     HISTORY OF PRESENT ILLNESS:    Maritza Donnelly is a 70y.o. year old female here for evaluation of asthma. The patient reports that despite her inhalers she is having more frequent issues with her breathing. She is currently prescribed Spiriva, Flovent, and Proventil. She reports that she is sometimes using the Flovent 3-4 times a day. Her breathing is worse with humidity and cold air. Symptoms include wheezing. She also reports having some sinus drainage and a daily cough. She quit smoking 2 or 3 years ago she reports that previously she smoked half to 1 pack/day for 40-adair years. She denies any thoracic surgeries. She denies any alcohol. No illicits. Her inhalational exposures reports that she is around hay, raccoons, a wood burner, diesel smoke, and she has 2 horses. She has no significant travel history. She reports that her father  of lung cancer and that her brother has COPD and \"spots on his lungs. Updated HPI as of 2022:  Patient reports that her breathing feels a little bit better. She is using her albuterol inhaler sometimes 2-3 times a day on bad days sometimes not having to use it at all she does state that she continues to use the Trelegy inhaler. She states that when she notices that she is most short of breath is when she is going outside and working with her horses. And walking long distances to and from her bottom. Sometimes she does feel occasional heart racing with the shortness of breath however this does quickly subside with rest.  She denies any symptoms of chest pain chest pressure. Updated HPI 23:  Patient seen and examined. She is still using the Trelegy inhaler and singulair. She is using the rescue inhaler twice a day. She reports that she was admitted to [facility] hospital  to  due to colitis. She denies any new respiratory symptoms.  She does however state

## 2023-11-02 ENCOUNTER — OFFICE VISIT (OUTPATIENT)
Dept: CARDIOLOGY CLINIC | Age: 68
End: 2023-11-02
Payer: MEDICARE

## 2023-11-02 VITALS
WEIGHT: 151.3 LBS | HEART RATE: 58 BPM | BODY MASS INDEX: 29.7 KG/M2 | SYSTOLIC BLOOD PRESSURE: 108 MMHG | OXYGEN SATURATION: 96 % | RESPIRATION RATE: 20 BRPM | DIASTOLIC BLOOD PRESSURE: 60 MMHG | HEIGHT: 60 IN

## 2023-11-02 DIAGNOSIS — I47.10 SUPRAVENTRICULAR TACHYCARDIA: Primary | ICD-10-CM

## 2023-11-02 PROCEDURE — 99214 OFFICE O/P EST MOD 30 MIN: CPT | Performed by: INTERNAL MEDICINE

## 2023-11-02 PROCEDURE — 3074F SYST BP LT 130 MM HG: CPT | Performed by: INTERNAL MEDICINE

## 2023-11-02 PROCEDURE — 93000 ELECTROCARDIOGRAM COMPLETE: CPT | Performed by: INTERNAL MEDICINE

## 2023-11-02 PROCEDURE — 1123F ACP DISCUSS/DSCN MKR DOCD: CPT | Performed by: INTERNAL MEDICINE

## 2023-11-02 PROCEDURE — 3078F DIAST BP <80 MM HG: CPT | Performed by: INTERNAL MEDICINE

## 2023-11-02 RX ORDER — COLESEVELAM 180 1/1
1875 TABLET ORAL 2 TIMES DAILY WITH MEALS
COMMUNITY

## 2023-11-02 NOTE — PATIENT INSTRUCTIONS
Continue Cardizem  mg  in the evening rather than in the morning. Coronary CTA results were reviewed, total calcium score was 57 with 69 percentile score. It also showed 20 to 30% stenosis in the proximal RCA and 20 to 30% stenosis in the LAD. Patient was recommended to start on statin therapy however, she was intolerant to multiple statins based on her history,  could not tolerate simvastatin atorvastatin or rosuvastatin and lately Pravastatin. Recent lipid panel done on 5/9/23 was reviewed and discussed with the patient. Continue Welchol. Her insurance did approve Repatha but she declined it take it. Continue  benazepril hydrochlorothiazide 20/12.5 daily for hypertension. Monitor BP daily and call me in  one week if BP remains low then we need to cut down the medication. Continue rest of the current medications  Echo results reviewed as above.    Follow-up with me in 6 months

## 2023-11-02 NOTE — PROGRESS NOTES
OUTPATIENT CARDIOLOGY FOLLOW UP    Name: Ardis Sacks    Age: 70 y.o. Date of Service: 10/27/22    Reason for Consultation:  Chief Complaint   Patient presents with    supraventricular tachycardia     OV-4 month       Referring Physician: Benny Box DO    History of Present Illness:  Ardis Sacks is a 70 y.o. female who presents today for follow up of palpitations and shortness of breath  PMH includes former smoker with 75-pack-year history of smoking and quit in 2019, COPD, hypertension, hyperlipidemia, hyperglycemia, hypothyroidism on HRT, GERD, chronic pain syndrome with chronic neck lower back pain secondary to industrial accident, depression, GERD, patient was unable to tolerate statins due to muscle pains. Now, she can farther than before and feeling much better. She also gets palpitations at the same time when she gets winded. Denies any chest pain or fatigue, syncope or presyncope. She quit smoking 15 year ago. Denies any illicit drugs or alcohol use. No family history of premature CAD. She was seen in the office on 6/29/23, since last visit, she has not any further hospitalizations. .  She had had a coronary CTA  in 2/23 which showed mild nonobstructive CAD with a calcium score of 57. Denies chest pain or shortness. Fatigue is much better. Her medical doctor told her that her fatigue is from her depression and getting treatment for depression. She could not also tolerate Pravastatin due to cramps and stopped  taking it. Now, she does not have any more dizziness after she started taking Cardizem in the  evening. Now, she is taking Welchol and tolerating well. She has no complaints of chest pain, lightheadedness, dizziness, or syncope. No history of PND or orthopnea. She is currently with no active cardiac complaints at rest. SR on EKG. ER and hospital visits:      ER and hospital visits:   1/20/23: ER visit for colitis  5/10/23: ER visits for ankle sprain.

## 2023-11-07 ENCOUNTER — TELEPHONE (OUTPATIENT)
Dept: PULMONOLOGY | Age: 68
End: 2023-11-07

## 2023-11-07 NOTE — TELEPHONE ENCOUNTER
Mailed a letter to patient informing her that her CT Chest is scheduled for 12-4-23 at 10:00 am at the [facility]. She must arrive by 9:30 am. There is no prep for this test

## 2023-11-13 ENCOUNTER — TELEPHONE (OUTPATIENT)
Dept: CARDIOLOGY CLINIC | Age: 68
End: 2023-11-13

## 2023-11-13 NOTE — TELEPHONE ENCOUNTER
Patient's BP Readings:    11/7      86/54    64  11/8     102/57   65  11/9     110/62   63  11/10   108/57   64  11/11    77/53    62  11/12   111/60   65  11/13     90/60   61    Please advise

## 2023-11-14 DIAGNOSIS — I10 ESSENTIAL HYPERTENSION: ICD-10-CM

## 2023-11-14 RX ORDER — BENAZEPRIL HYDROCHLORIDE 10 MG/1
10 TABLET ORAL DAILY
Qty: 30 TABLET | Refills: 3 | Status: SHIPPED | OUTPATIENT
Start: 2023-11-14

## 2023-11-14 NOTE — TELEPHONE ENCOUNTER
BP is low, please ask her stop Benazepril/HCTZ and take Benazepril 10 mg po daily monitor BP daily and call me in one week.

## 2023-12-04 ENCOUNTER — HOSPITAL ENCOUNTER (OUTPATIENT)
Dept: CT IMAGING | Age: 68
Discharge: HOME | End: 2023-12-06
Attending: INTERNAL MEDICINE
Payer: MEDICARE

## 2023-12-04 DIAGNOSIS — R91.1 LUNG NODULE: ICD-10-CM

## 2023-12-04 PROCEDURE — 71250 CT THORAX DX C-: CPT

## 2023-12-06 RX ORDER — DILTIAZEM HYDROCHLORIDE 120 MG/1
120 CAPSULE, COATED, EXTENDED RELEASE ORAL EVERY EVENING
Qty: 30 CAPSULE | Refills: 11 | Status: SHIPPED | OUTPATIENT
Start: 2023-12-06

## 2023-12-15 DIAGNOSIS — E78.00 PURE HYPERCHOLESTEROLEMIA: Primary | ICD-10-CM

## 2023-12-15 RX ORDER — ALBUTEROL SULFATE 90 UG/1
4 AEROSOL, METERED RESPIRATORY (INHALATION) PRN
OUTPATIENT
Start: 2023-12-15

## 2023-12-15 RX ORDER — ONDANSETRON 2 MG/ML
8 INJECTION INTRAMUSCULAR; INTRAVENOUS
OUTPATIENT
Start: 2023-12-15

## 2023-12-15 RX ORDER — ACETAMINOPHEN 325 MG/1
650 TABLET ORAL
OUTPATIENT
Start: 2023-12-15

## 2023-12-15 RX ORDER — EPINEPHRINE 1 MG/ML
0.3 INJECTION, SOLUTION, CONCENTRATE INTRAVENOUS PRN
OUTPATIENT
Start: 2023-12-15

## 2023-12-15 RX ORDER — SODIUM CHLORIDE 9 MG/ML
INJECTION, SOLUTION INTRAVENOUS CONTINUOUS
OUTPATIENT
Start: 2023-12-15

## 2023-12-15 RX ORDER — DIPHENHYDRAMINE HYDROCHLORIDE 50 MG/ML
50 INJECTION INTRAMUSCULAR; INTRAVENOUS
OUTPATIENT
Start: 2023-12-15

## 2023-12-28 ENCOUNTER — TELEPHONE (OUTPATIENT)
Dept: PULMONOLOGY | Age: 68
End: 2023-12-28

## 2023-12-28 DIAGNOSIS — R91.1 LUNG NODULE: Primary | ICD-10-CM

## 2023-12-28 NOTE — TELEPHONE ENCOUNTER
Mailed letter to patient to inform her of the CT Chest that is scheduled for Wednesday, March 6, 2024 at 11:00 am with an arrival time of 10:30 am at the [facility] on [address] [place] [address].  There is no prep for this test.

## 2023-12-29 ENCOUNTER — HOSPITAL ENCOUNTER (OUTPATIENT)
Dept: INFUSION THERAPY | Age: 68
Setting detail: INFUSION SERIES
Discharge: HOME | End: 2023-12-29
Payer: MEDICARE

## 2023-12-29 VITALS
OXYGEN SATURATION: 98 % | SYSTOLIC BLOOD PRESSURE: 119 MMHG | RESPIRATION RATE: 16 BRPM | DIASTOLIC BLOOD PRESSURE: 60 MMHG | HEART RATE: 57 BPM | TEMPERATURE: 98.3 F

## 2023-12-29 DIAGNOSIS — E78.00 PURE HYPERCHOLESTEROLEMIA: Primary | ICD-10-CM

## 2023-12-29 PROCEDURE — 6360000002 HC RX W HCPCS: Performed by: INTERNAL MEDICINE

## 2023-12-29 PROCEDURE — 96372 THER/PROPH/DIAG INJ SC/IM: CPT

## 2023-12-29 RX ORDER — EPINEPHRINE 1 MG/ML
0.3 INJECTION, SOLUTION, CONCENTRATE INTRAVENOUS PRN
OUTPATIENT
Start: 2024-03-28

## 2023-12-29 RX ORDER — ALBUTEROL SULFATE 90 UG/1
4 AEROSOL, METERED RESPIRATORY (INHALATION) PRN
OUTPATIENT
Start: 2024-03-28

## 2023-12-29 RX ORDER — DIPHENHYDRAMINE HYDROCHLORIDE 50 MG/ML
50 INJECTION INTRAMUSCULAR; INTRAVENOUS
OUTPATIENT
Start: 2024-03-28

## 2023-12-29 RX ORDER — ACETAMINOPHEN 325 MG/1
650 TABLET ORAL
OUTPATIENT
Start: 2024-03-28

## 2023-12-29 RX ORDER — MEPERIDINE HYDROCHLORIDE 25 MG/ML
25 INJECTION INTRAMUSCULAR; INTRAVENOUS; SUBCUTANEOUS ONCE
Start: 2024-03-28 | End: 2024-03-28

## 2023-12-29 RX ORDER — ONDANSETRON 2 MG/ML
8 INJECTION INTRAMUSCULAR; INTRAVENOUS
OUTPATIENT
Start: 2024-03-28

## 2023-12-29 RX ORDER — SODIUM CHLORIDE 9 MG/ML
INJECTION, SOLUTION INTRAVENOUS CONTINUOUS
OUTPATIENT
Start: 2024-03-28

## 2023-12-29 RX ADMIN — INCLISIRAN 284 MG: 284 INJECTION, SOLUTION SUBCUTANEOUS at 13:29

## 2023-12-29 ASSESSMENT — PAIN - FUNCTIONAL ASSESSMENT: PAIN_FUNCTIONAL_ASSESSMENT: PREVENTS OR INTERFERES WITH MANY ACTIVE NOT PASSIVE ACTIVITIES

## 2023-12-29 ASSESSMENT — PAIN SCALES - GENERAL: PAINLEVEL_OUTOF10: 6

## 2023-12-29 ASSESSMENT — PAIN DESCRIPTION - ORIENTATION: ORIENTATION: RIGHT

## 2023-12-29 ASSESSMENT — PAIN DESCRIPTION - PAIN TYPE: TYPE: CHRONIC PAIN

## 2023-12-29 ASSESSMENT — PAIN DESCRIPTION - LOCATION: LOCATION: NECK;SHOULDER

## 2023-12-29 ASSESSMENT — PAIN DESCRIPTION - DESCRIPTORS: DESCRIPTORS: ACHING;SHARP

## 2023-12-29 NOTE — DISCHARGE INSTRUCTIONS
inclisiran  Pronunciation:  IN jai puckett  Brand:  Tyler Brittneyveronica  What is the most important information I should know about inclisiran? Use only as directed. Tell your doctor if you use other medicines or have other medical conditions or allergies. What is inclisiran? Trenton Sleight works by helping the liver reduce levels of \"bad\" cholesterol (low-density lipoprotein, or LDL) circulating in your blood. Trenton Sleight is used together with a low-fat diet and other cholesterol-lowering medications in adults with heterozygous familial hypercholesterolemia (inherited types of high cholesterol). These conditions can cause high blood levels of LDL cholesterol, and can also cause plaque to build up inside your arteries. Trenton Sleight is also used in adults with atherosclerotic cardiovascular disease (ASCVD) needing additional lowering of LDL cholesterol. Trenton Sleight may also be used for purposes not listed in this medication guide. What should I discuss with my healthcare provider before receiving inclisiran? Tell your doctor if you have:  end-stage kidney disease or severe liver disease. May harm an unborn baby. Do not use if you are pregnant. It may not be safe to breastfeed while using this medicine. Ask your doctor about any risk. Ask a doctor if it is safe to breastfeed while using this medicine. Not approved for use by anyone younger than 25years old. How is this drug given? Inclisiran is injected under the skin. A healthcare provider will give you this injection. You should not stop using inclisiran without your doctor's advice, or your LDL cholesterol levels may increase. Trenton Sleight is only part of a complete treatment program that also includes diet, statin medication, and regular blood testing. Follow your doctor's instructions very closely. What happens if I miss a dose? Call your doctor for instructions if you miss an appointment for your inclisiran injection. What happens if I overdose?   In a medical

## 2023-12-29 NOTE — FLOWSHEET NOTE
Discharged to home in stable condition, PT TOLERATED injection well, pt observed for 30 minutes after, no reports of any side effects, VS stable, pt given printed discharge instructions, also verbal instructions also given.

## 2024-01-15 ENCOUNTER — TELEPHONE (OUTPATIENT)
Dept: PULMONOLOGY | Age: 69
End: 2024-01-15

## 2024-01-15 NOTE — TELEPHONE ENCOUNTER
Patient stopped in at the office thinking she had an appointment.  Patient was given the correct appointment day and time.  Patient complained that she is getting SOB with the infusion that she received a couple of weeks ago.  A message was left with the patient's voicemail to call the office back regarding this.

## 2024-01-15 NOTE — TELEPHONE ENCOUNTER
Patient came in for her appointment, which was cancelled for the third time. It is rescheduled for March. She said she hadn't gotten a letter or phone call about the appointment being cancelled and came all the way from [place] and was really hoping to see the doctor. She said ever since her infusion 2 weeks ago she has been having issues with her breathing, which is one of the side effects. She stated that since the infusion she has been finding it hard to catch her breath. Wants you to please give her a call [phone].

## 2024-01-18 ENCOUNTER — TELEPHONE (OUTPATIENT)
Dept: PULMONOLOGY | Age: 69
End: 2024-01-18

## 2024-01-18 DIAGNOSIS — J41.0 SIMPLE CHRONIC BRONCHITIS (HCC): ICD-10-CM

## 2024-01-18 DIAGNOSIS — R06.09 DOE (DYSPNEA ON EXERTION): Primary | ICD-10-CM

## 2024-01-18 RX ORDER — ALBUTEROL SULFATE 90 UG/1
2 AEROSOL, METERED RESPIRATORY (INHALATION) EVERY 6 HOURS PRN
Qty: 18 G | Refills: 3 | Status: SHIPPED | OUTPATIENT
Start: 2024-01-18

## 2024-03-08 ENCOUNTER — TELEPHONE (OUTPATIENT)
Dept: PULMONOLOGY | Age: 69
End: 2024-03-08

## 2024-03-11 ENCOUNTER — OFFICE VISIT (OUTPATIENT)
Dept: PULMONOLOGY | Age: 69
End: 2024-03-11
Payer: MEDICARE

## 2024-03-11 ENCOUNTER — TELEPHONE (OUTPATIENT)
Dept: PULMONOLOGY | Age: 69
End: 2024-03-11

## 2024-03-11 VITALS
DIASTOLIC BLOOD PRESSURE: 67 MMHG | OXYGEN SATURATION: 94 % | WEIGHT: 153 LBS | RESPIRATION RATE: 16 BRPM | BODY MASS INDEX: 30.04 KG/M2 | TEMPERATURE: 98.4 F | HEART RATE: 74 BPM | HEIGHT: 60 IN | SYSTOLIC BLOOD PRESSURE: 135 MMHG

## 2024-03-11 DIAGNOSIS — R91.1 LUNG NODULE: Primary | ICD-10-CM

## 2024-03-11 DIAGNOSIS — J43.1 PANLOBULAR EMPHYSEMA (HCC): ICD-10-CM

## 2024-03-11 DIAGNOSIS — R06.09 DOE (DYSPNEA ON EXERTION): ICD-10-CM

## 2024-03-11 LAB
EXPIRATORY TIME: 8.18 SEC
FEF 25-75% %PRED-PRE: 28 L/SEC
FEF 25-75% PRED: 1.71 L/SEC
FEF 25-75-PRE: 0.49 L/SEC
FEV1 %PRED-PRE: 54 %
FEV1 PRED: 1.93 L
FEV1/FVC %PRED-PRE: 69 %
FEV1/FVC PRED: 79 %
FEV1/FVC: 55 %
FEV1: 1.05 L
FVC %PRED-PRE: 77 %
FVC PRED: 2.46 L
FVC: 1.91 L
PEF %PRED-PRE: 41 L/SEC
PEF PRED: 5.05 L/SEC
PEF-PRE: 2.1 L/SEC

## 2024-03-11 PROCEDURE — 1123F ACP DISCUSS/DSCN MKR DOCD: CPT | Performed by: INTERNAL MEDICINE

## 2024-03-11 PROCEDURE — 3075F SYST BP GE 130 - 139MM HG: CPT | Performed by: INTERNAL MEDICINE

## 2024-03-11 PROCEDURE — 94010 BREATHING CAPACITY TEST: CPT | Performed by: INTERNAL MEDICINE

## 2024-03-11 PROCEDURE — 99213 OFFICE O/P EST LOW 20 MIN: CPT | Performed by: INTERNAL MEDICINE

## 2024-03-11 PROCEDURE — 3078F DIAST BP <80 MM HG: CPT | Performed by: INTERNAL MEDICINE

## 2024-03-11 ASSESSMENT — PULMONARY FUNCTION TESTS
FVC_PERCENT_PREDICTED_PRE: 77
FVC: 1.91
FVC_PREDICTED: 2.46
FEV1/FVC: 55
FEV1/FVC_PERCENT_PREDICTED_PRE: 69
FEV1_PREDICTED: 1.93
FEV1/FVC_PREDICTED: 79
FEV1_PERCENT_PREDICTED_PRE: 54
FEV1: 1.05

## 2024-03-11 NOTE — PROGRESS NOTES
Patient to follow up with physician in 6 months.  Patient to be scheduled for a CT Chest in June with a phone call from doctor with results.  [facility]ero to be called for the xray images that were taken in January.    
95% of all positive results do not lead to a diagnosis of cancer. Usually further imaging can resolve most false-positive results; however, some patients may require invasive procedures.    Over diagnosis risk - 10% to 12% of screen-detected lung cancer cases are over diagnosed--that is, the cancer would not have been detected in the patient's lifetime without the screening.    Need for follow up screens annually to continue lung cancer screening effectiveness     Risks associated with radiation from annual LDCT- Radiation exposure is about the same as for a mammogram, which is about 1/3 of the annual background radiation exposure from everyday life.  Starting screening at age 55 is not likely to increase cancer risk from radiation exposure.    Patients with comorbidities resulting in life expectancy of < 10 years, or that would preclude treatment of an abnormality identified on CT, should not be screened due to lack of benefit.    To obtain maximal benefit from this screening, smoking cessation and long-term abstinence from smoking is critical

## 2024-03-11 NOTE — TELEPHONE ENCOUNTER
A message was left with the [facility] imaging department requesting xrays from January 2024 that the patient had done to be pushed into [facility] PACs system.

## 2024-03-11 NOTE — PATIENT INSTRUCTIONS
Adriane Campos    [facility]  [address] [place] [id number]  Office: [phone]      Your were seen in the office today for COPD      We  did not make changes to your medications today.        Testing ordered today was CT scan of the chest due June 2024      Vaccines recommended none (up to date)               Please do not hesitate to call the office with any questions.

## 2024-04-15 ENCOUNTER — HOSPITAL ENCOUNTER (OUTPATIENT)
Age: 69
Discharge: HOME | End: 2024-04-15
Payer: COMMERCIAL

## 2024-04-15 PROCEDURE — 93005 ELECTROCARDIOGRAM TRACING: CPT | Performed by: PSYCHIATRY & NEUROLOGY

## 2024-04-16 LAB
EKG ATRIAL RATE: 57 BPM
EKG P AXIS: 67 DEGREES
EKG P-R INTERVAL: 178 MS
EKG Q-T INTERVAL: 416 MS
EKG QRS DURATION: 86 MS
EKG QTC CALCULATION (BAZETT): 404 MS
EKG R AXIS: 42 DEGREES
EKG T AXIS: 54 DEGREES
EKG VENTRICULAR RATE: 57 BPM

## 2024-05-02 ENCOUNTER — TELEPHONE (OUTPATIENT)
Dept: PULMONOLOGY | Age: 69
End: 2024-05-02

## 2024-05-02 NOTE — TELEPHONE ENCOUNTER
Mailed a letter to patient informing her that her CT Chest is scheduled for 6-3-24 at 12:00 pm at the [facility]. She must arrive by 11:30 am. There is no prep for this test

## 2024-06-03 ENCOUNTER — HOSPITAL ENCOUNTER (OUTPATIENT)
Dept: CT IMAGING | Age: 69
Discharge: HOME | End: 2024-06-05
Attending: INTERNAL MEDICINE
Payer: MEDICARE

## 2024-06-03 DIAGNOSIS — R91.1 LUNG NODULE: ICD-10-CM

## 2024-06-03 PROCEDURE — 71250 CT THORAX DX C-: CPT

## 2024-06-12 ENCOUNTER — TELEPHONE (OUTPATIENT)
Dept: PULMONOLOGY | Age: 69
End: 2024-06-12

## 2024-06-12 NOTE — TELEPHONE ENCOUNTER
Called and discussed CT of chest. All questions answered. Follow up CT chest in June 2025 recommended.   Adriane Campos, DO

## 2024-09-17 RX ORDER — FLUTICASONE FUROATE, UMECLIDINIUM BROMIDE AND VILANTEROL TRIFENATATE 200; 62.5; 25 UG/1; UG/1; UG/1
POWDER RESPIRATORY (INHALATION)
Qty: 180 EACH | Refills: 3 | Status: SHIPPED | OUTPATIENT
Start: 2024-09-17

## 2025-03-05 DIAGNOSIS — E03.9 ACQUIRED HYPOTHYROIDISM: ICD-10-CM

## 2025-03-05 RX ORDER — LEVOTHYROXINE SODIUM 100 UG/1
100 TABLET ORAL DAILY
Qty: 30 TABLET | Refills: 0 | OUTPATIENT
Start: 2025-03-05